# Patient Record
Sex: MALE | Race: WHITE | NOT HISPANIC OR LATINO | URBAN - METROPOLITAN AREA
[De-identification: names, ages, dates, MRNs, and addresses within clinical notes are randomized per-mention and may not be internally consistent; named-entity substitution may affect disease eponyms.]

---

## 2017-03-07 ENCOUNTER — INPATIENT (INPATIENT)
Facility: HOSPITAL | Age: 72
LOS: 1 days | Discharge: HOME | DRG: 603 | End: 2017-03-09
Attending: HOSPITALIST | Admitting: INTERNAL MEDICINE
Payer: MEDICARE

## 2017-03-07 VITALS
OXYGEN SATURATION: 98 % | HEART RATE: 88 BPM | RESPIRATION RATE: 18 BRPM | DIASTOLIC BLOOD PRESSURE: 77 MMHG | SYSTOLIC BLOOD PRESSURE: 131 MMHG | TEMPERATURE: 98 F

## 2017-03-07 DIAGNOSIS — F17.200 NICOTINE DEPENDENCE, UNSPECIFIED, UNCOMPLICATED: ICD-10-CM

## 2017-03-07 DIAGNOSIS — C18.9 MALIGNANT NEOPLASM OF COLON, UNSPECIFIED: ICD-10-CM

## 2017-03-07 DIAGNOSIS — Z98.89 OTHER SPECIFIED POSTPROCEDURAL STATES: Chronic | ICD-10-CM

## 2017-03-07 DIAGNOSIS — H44.002 UNSPECIFIED PURULENT ENDOPHTHALMITIS, LEFT EYE: ICD-10-CM

## 2017-03-07 DIAGNOSIS — B19.20 UNSPECIFIED VIRAL HEPATITIS C WITHOUT HEPATIC COMA: ICD-10-CM

## 2017-03-07 LAB
ALBUMIN SERPL ELPH-MCNC: 4 G/DL — SIGNIFICANT CHANGE UP (ref 3.3–5)
ALP SERPL-CCNC: 59 U/L — SIGNIFICANT CHANGE UP (ref 40–120)
ALT FLD-CCNC: 19 U/L RC — SIGNIFICANT CHANGE UP (ref 10–45)
ANION GAP SERPL CALC-SCNC: 14 MMOL/L — SIGNIFICANT CHANGE UP (ref 5–17)
AST SERPL-CCNC: 19 U/L — SIGNIFICANT CHANGE UP (ref 10–40)
BASOPHILS # BLD AUTO: 0 K/UL — SIGNIFICANT CHANGE UP (ref 0–0.2)
BASOPHILS NFR BLD AUTO: 0.5 % — SIGNIFICANT CHANGE UP (ref 0–2)
BILIRUB SERPL-MCNC: 0.4 MG/DL — SIGNIFICANT CHANGE UP (ref 0.2–1.2)
BUN SERPL-MCNC: 19 MG/DL — SIGNIFICANT CHANGE UP (ref 7–23)
CALCIUM SERPL-MCNC: 9 MG/DL — SIGNIFICANT CHANGE UP (ref 8.4–10.5)
CHLORIDE SERPL-SCNC: 107 MMOL/L — SIGNIFICANT CHANGE UP (ref 96–108)
CO2 SERPL-SCNC: 23 MMOL/L — SIGNIFICANT CHANGE UP (ref 22–31)
CREAT SERPL-MCNC: 0.88 MG/DL — SIGNIFICANT CHANGE UP (ref 0.5–1.3)
EOSINOPHIL # BLD AUTO: 0.2 K/UL — SIGNIFICANT CHANGE UP (ref 0–0.5)
EOSINOPHIL NFR BLD AUTO: 2.6 % — SIGNIFICANT CHANGE UP (ref 0–6)
GLUCOSE SERPL-MCNC: 96 MG/DL — SIGNIFICANT CHANGE UP (ref 70–99)
HCT VFR BLD CALC: 43.9 % — SIGNIFICANT CHANGE UP (ref 39–50)
HGB BLD-MCNC: 15.6 G/DL — SIGNIFICANT CHANGE UP (ref 13–17)
LYMPHOCYTES # BLD AUTO: 2.4 K/UL — SIGNIFICANT CHANGE UP (ref 1–3.3)
LYMPHOCYTES # BLD AUTO: 29.2 % — SIGNIFICANT CHANGE UP (ref 13–44)
MCHC RBC-ENTMCNC: 35.3 PG — HIGH (ref 27–34)
MCHC RBC-ENTMCNC: 35.5 GM/DL — SIGNIFICANT CHANGE UP (ref 32–36)
MCV RBC AUTO: 99.3 FL — SIGNIFICANT CHANGE UP (ref 80–100)
MONOCYTES # BLD AUTO: 0.6 K/UL — SIGNIFICANT CHANGE UP (ref 0–0.9)
MONOCYTES NFR BLD AUTO: 7.9 % — SIGNIFICANT CHANGE UP (ref 2–14)
NEUTROPHILS # BLD AUTO: 4.9 K/UL — SIGNIFICANT CHANGE UP (ref 1.8–7.4)
NEUTROPHILS NFR BLD AUTO: 59.8 % — SIGNIFICANT CHANGE UP (ref 43–77)
PLATELET # BLD AUTO: 173 K/UL — SIGNIFICANT CHANGE UP (ref 150–400)
POTASSIUM SERPL-MCNC: 4 MMOL/L — SIGNIFICANT CHANGE UP (ref 3.5–5.3)
POTASSIUM SERPL-SCNC: 4 MMOL/L — SIGNIFICANT CHANGE UP (ref 3.5–5.3)
PROT SERPL-MCNC: 6.8 G/DL — SIGNIFICANT CHANGE UP (ref 6–8.3)
RBC # BLD: 4.42 M/UL — SIGNIFICANT CHANGE UP (ref 4.2–5.8)
RBC # FLD: 11.8 % — SIGNIFICANT CHANGE UP (ref 10.3–14.5)
SODIUM SERPL-SCNC: 144 MMOL/L — SIGNIFICANT CHANGE UP (ref 135–145)
WBC # BLD: 8.2 K/UL — SIGNIFICANT CHANGE UP (ref 3.8–10.5)
WBC # FLD AUTO: 8.2 K/UL — SIGNIFICANT CHANGE UP (ref 3.8–10.5)

## 2017-03-07 PROCEDURE — 70487 CT MAXILLOFACIAL W/DYE: CPT | Mod: 26

## 2017-03-07 PROCEDURE — 99285 EMERGENCY DEPT VISIT HI MDM: CPT | Mod: 25

## 2017-03-07 PROCEDURE — 99223 1ST HOSP IP/OBS HIGH 75: CPT | Mod: AI

## 2017-03-07 PROCEDURE — 93010 ELECTROCARDIOGRAM REPORT: CPT

## 2017-03-07 RX ORDER — VANCOMYCIN HCL 1 G
1000 VIAL (EA) INTRAVENOUS ONCE
Qty: 0 | Refills: 0 | Status: COMPLETED | OUTPATIENT
Start: 2017-03-07 | End: 2017-03-07

## 2017-03-07 RX ORDER — SODIUM CHLORIDE 9 MG/ML
1000 INJECTION INTRAMUSCULAR; INTRAVENOUS; SUBCUTANEOUS ONCE
Qty: 0 | Refills: 0 | Status: COMPLETED | OUTPATIENT
Start: 2017-03-07 | End: 2017-03-07

## 2017-03-07 RX ADMIN — Medication 250 MILLIGRAM(S): at 22:00

## 2017-03-07 RX ADMIN — SODIUM CHLORIDE 500 MILLILITER(S): 9 INJECTION INTRAMUSCULAR; INTRAVENOUS; SUBCUTANEOUS at 21:59

## 2017-03-07 NOTE — ED PROVIDER NOTE - CONSTITUTIONAL, MLM
normal... Well appearing, well nourished, awake, alert, oriented to person, place, time/situation and in no apparent distress. Afebrile with normal vitals.

## 2017-03-07 NOTE — ED PROVIDER NOTE - OBJECTIVE STATEMENT
71 year old male patient complaining of pus coming out of left eye. Patient had left eye enucleation in middle of January. Subsequently infected and placed on oral clindamycin. However, symptoms have worsened. Seen by primary ophthalmologist Dr. Lee who sent him here for IV antibiotics. Patient reports no headache. No fever/chills or nausea/vomiting. Quality of pus is yellow and foul smelling. Severity is 7/10. No modifiers.

## 2017-03-07 NOTE — ED PROVIDER NOTE - NS ED MD SCRIBE ATTENDING SCRIBE SECTIONS
VITAL SIGNS( Pullset)/PHYSICAL EXAM/HISTORY OF PRESENT ILLNESS/PAST MEDICAL/SURGICAL/SOCIAL HISTORY/DISPOSITION/HIV/REVIEW OF SYSTEMS

## 2017-03-07 NOTE — ED ADULT NURSE NOTE - PMH
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

## 2017-03-07 NOTE — H&P ADULT. - HISTORY OF PRESENT ILLNESS
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  Pt states had surgery 1/11 with hip flap for closure.  States shortly after surgery + infection at hip site requiring abx.  Then noticed infection of eye as well.  Pt has been wearing eye prosthesis.  Pt has been on PO clinidamycin for 3 weeks without improvement to eye though states hip infection has resolved.  Saw optho today and was told to come to ED for IV Abx.    In /77, hr 88, temp 36.5, sat 98, rr 18  Given vancomycin 1G IV x 1, NS 1L x 1.  Seen by ophtho.

## 2017-03-07 NOTE — H&P ADULT. - PMH
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

## 2017-03-07 NOTE — H&P ADULT. - PROBLEM SELECTOR PLAN 1
ophtho input appreciated  cont iv vancomycin for now  topical bacitracin bid  no prosthesis but eye covering in place ophtho input appreciated  cont iv vancomycin for now  topical bacitracin bid  no prosthesis but eye covering in place  f/u official CT result ? need for debridement  encourage ambulation, pt highly functional, for DVT prophylaxis

## 2017-03-07 NOTE — ED ADULT NURSE NOTE - OBJECTIVE STATEMENT
pt with hx of prosthetic eye since birth. has had multiple surgeries to revise eye and socket. had surgery last week and sent home on clindamycin, and eye drops/ointment. seen at md today and sent for potential infection to left eye/socket. no drainage, redness around left eye and mild edema noted. pt denies fevers, no resp distress, skin warm, dry.

## 2017-03-07 NOTE — ED PROVIDER NOTE - DETAILS:
MD Cope:  The scribe's documentation has been prepared under my direction and personally reviewed by me in its entirety. I confirm that the note above accurately reflects all work, treatment, procedures, and medical decision making performed by me.  MD Cope:  see the MDM & progress notes for my I&P.

## 2017-03-07 NOTE — ED PROVIDER NOTE - EYES, MLM
Left eye prosthesis bulging out. Some purulent drainage from the orbit. No significant tenderness to palpation.

## 2017-03-07 NOTE — H&P ADULT. - FAMILY HISTORY
Father  Still living? Unknown  Family history of Alzheimer's disease, Age at diagnosis: Age Unknown  Family history of early CAD, Age at diagnosis: Age Unknown

## 2017-03-08 PROCEDURE — 99232 SBSQ HOSP IP/OBS MODERATE 35: CPT

## 2017-03-08 PROCEDURE — 99222 1ST HOSP IP/OBS MODERATE 55: CPT

## 2017-03-08 RX ORDER — BACITRACIN 500 [USP'U]/G
1 OINTMENT OPHTHALMIC
Qty: 0 | Refills: 0 | Status: DISCONTINUED | OUTPATIENT
Start: 2017-03-07 | End: 2017-03-09

## 2017-03-08 RX ORDER — MEROPENEM 1 G/30ML
1000 INJECTION INTRAVENOUS EVERY 12 HOURS
Qty: 0 | Refills: 0 | Status: DISCONTINUED | OUTPATIENT
Start: 2017-03-08 | End: 2017-03-09

## 2017-03-08 RX ORDER — VANCOMYCIN HCL 1 G
1000 VIAL (EA) INTRAVENOUS EVERY 12 HOURS
Qty: 0 | Refills: 0 | Status: DISCONTINUED | OUTPATIENT
Start: 2017-03-07 | End: 2017-03-09

## 2017-03-08 RX ADMIN — BACITRACIN 1 APPLICATION(S): 500 OINTMENT OPHTHALMIC at 04:30

## 2017-03-08 RX ADMIN — Medication 250 MILLIGRAM(S): at 10:30

## 2017-03-08 RX ADMIN — MEROPENEM 200 MILLIGRAM(S): 1 INJECTION INTRAVENOUS at 12:59

## 2017-03-09 ENCOUNTER — TRANSCRIPTION ENCOUNTER (OUTPATIENT)
Age: 72
End: 2017-03-09

## 2017-03-09 VITALS
OXYGEN SATURATION: 95 % | TEMPERATURE: 98 F | SYSTOLIC BLOOD PRESSURE: 129 MMHG | DIASTOLIC BLOOD PRESSURE: 83 MMHG | RESPIRATION RATE: 18 BRPM | HEART RATE: 68 BPM

## 2017-03-09 LAB
ANION GAP SERPL CALC-SCNC: 12 MMOL/L — SIGNIFICANT CHANGE UP (ref 5–17)
BUN SERPL-MCNC: 15 MG/DL — SIGNIFICANT CHANGE UP (ref 7–23)
CALCIUM SERPL-MCNC: 8.3 MG/DL — LOW (ref 8.4–10.5)
CHLORIDE SERPL-SCNC: 107 MMOL/L — SIGNIFICANT CHANGE UP (ref 96–108)
CO2 SERPL-SCNC: 21 MMOL/L — LOW (ref 22–31)
CREAT SERPL-MCNC: 0.86 MG/DL — SIGNIFICANT CHANGE UP (ref 0.5–1.3)
GLUCOSE SERPL-MCNC: 98 MG/DL — SIGNIFICANT CHANGE UP (ref 70–99)
HCT VFR BLD CALC: 39.6 % — SIGNIFICANT CHANGE UP (ref 39–50)
HGB BLD-MCNC: 13.6 G/DL — SIGNIFICANT CHANGE UP (ref 13–17)
MCHC RBC-ENTMCNC: 33.8 PG — SIGNIFICANT CHANGE UP (ref 27–34)
MCHC RBC-ENTMCNC: 34.3 GM/DL — SIGNIFICANT CHANGE UP (ref 32–36)
MCV RBC AUTO: 98.5 FL — SIGNIFICANT CHANGE UP (ref 80–100)
PLATELET # BLD AUTO: 151 K/UL — SIGNIFICANT CHANGE UP (ref 150–400)
POTASSIUM SERPL-MCNC: 4.1 MMOL/L — SIGNIFICANT CHANGE UP (ref 3.5–5.3)
POTASSIUM SERPL-SCNC: 4.1 MMOL/L — SIGNIFICANT CHANGE UP (ref 3.5–5.3)
RBC # BLD: 4.02 M/UL — LOW (ref 4.2–5.8)
RBC # FLD: 12.6 % — SIGNIFICANT CHANGE UP (ref 10.3–14.5)
SODIUM SERPL-SCNC: 140 MMOL/L — SIGNIFICANT CHANGE UP (ref 135–145)
WBC # BLD: 6.31 K/UL — SIGNIFICANT CHANGE UP (ref 3.8–10.5)
WBC # FLD AUTO: 6.31 K/UL — SIGNIFICANT CHANGE UP (ref 3.8–10.5)

## 2017-03-09 PROCEDURE — 99285 EMERGENCY DEPT VISIT HI MDM: CPT | Mod: 25

## 2017-03-09 PROCEDURE — 87040 BLOOD CULTURE FOR BACTERIA: CPT

## 2017-03-09 PROCEDURE — 87070 CULTURE OTHR SPECIMN AEROBIC: CPT

## 2017-03-09 PROCEDURE — 99222 1ST HOSP IP/OBS MODERATE 55: CPT

## 2017-03-09 PROCEDURE — 80053 COMPREHEN METABOLIC PANEL: CPT

## 2017-03-09 PROCEDURE — 96374 THER/PROPH/DIAG INJ IV PUSH: CPT | Mod: XU

## 2017-03-09 PROCEDURE — 80048 BASIC METABOLIC PNL TOTAL CA: CPT

## 2017-03-09 PROCEDURE — 99239 HOSP IP/OBS DSCHRG MGMT >30: CPT

## 2017-03-09 PROCEDURE — 99232 SBSQ HOSP IP/OBS MODERATE 35: CPT

## 2017-03-09 PROCEDURE — 93005 ELECTROCARDIOGRAM TRACING: CPT

## 2017-03-09 PROCEDURE — 70487 CT MAXILLOFACIAL W/DYE: CPT

## 2017-03-09 PROCEDURE — 85027 COMPLETE CBC AUTOMATED: CPT

## 2017-03-09 RX ORDER — BACITRACIN 500 [USP'U]/G
1 OINTMENT OPHTHALMIC THREE TIMES A DAY
Qty: 0 | Refills: 0 | Status: DISCONTINUED | OUTPATIENT
Start: 2017-03-09 | End: 2017-03-09

## 2017-03-09 RX ORDER — BACITRACIN 500 [USP'U]/G
1 OINTMENT OPHTHALMIC
Qty: 1 | Refills: 0
Start: 2017-03-09 | End: 2017-03-19

## 2017-03-09 RX ADMIN — MEROPENEM 200 MILLIGRAM(S): 1 INJECTION INTRAVENOUS at 03:39

## 2017-03-09 RX ADMIN — Medication 250 MILLIGRAM(S): at 01:00

## 2017-03-09 RX ADMIN — Medication 250 MILLIGRAM(S): at 12:48

## 2017-03-09 RX ADMIN — MEROPENEM 200 MILLIGRAM(S): 1 INJECTION INTRAVENOUS at 12:04

## 2017-03-09 NOTE — DISCHARGE NOTE ADULT - MEDICATION SUMMARY - MEDICATIONS TO TAKE
I will START or STAY ON the medications listed below when I get home from the hospital:    Percocet 5/325 oral tablet  -- 1 tab(s) by mouth every 6 hours, As Needed  -- Indication: For Eye pain    bacitracin ophthalmic 500 units/g ophthalmic ointment  -- 1 application in the left eye 3 times a day  -- For the eye.    -- Indication: For Preseptal cellulitis of left eye    Augmentin 875 mg-125 mg oral tablet  -- 1 tab(s) by mouth every 12 hours  -- Finish all this medication unless otherwise directed by prescriber.  Take with food or milk.    -- Indication: For Preseptal cellulitis of left eye

## 2017-03-09 NOTE — DISCHARGE NOTE ADULT - CARE PLAN
Principal Discharge DX:	Eye infection, left Principal Discharge DX:	Preseptal cellulitis of left eye  Goal:	Resolution of infection  Instructions for follow-up, activity and diet:	Please apply the topical bacitracin and take the Augmentin as prescribed.  Please follow-up with Dr. Lee tomorrow.  Dr. Lee will determine if your course of antibiotics needs to be lengthened or shortened.

## 2017-03-09 NOTE — DISCHARGE NOTE ADULT - PLAN OF CARE
Resolution of infection Please apply the topical bacitracin and take the Augmentin as prescribed.  Please follow-up with Dr. Lee tomorrow.  Dr. Lee will determine if your course of antibiotics needs to be lengthened or shortened.

## 2017-03-09 NOTE — DISCHARGE NOTE ADULT - CARE PROVIDER_API CALL
Olivier Lee (MD), Ophthalmology  [address] [place] [id number]  Phone: [phone]  Fax: [phone] Luis F Lee), Ophthalmology  [address] [place] [address]  Phone: [phone]  Fax: [phone]

## 2017-03-09 NOTE — DISCHARGE NOTE ADULT - PATIENT PORTAL LINK FT
“You can access the Follow[facility] Patient Portal, offered by [facility], by registering with the following website: [email or web address]”

## 2017-03-09 NOTE — DISCHARGE NOTE ADULT - CONDITIONS AT DISCHARGE
pt discharge home today, patient agreable to plan, discharge instructions given to patient - understanding verbalized, IVL removed - site remained asymptomatic, all belongings accounted for and with patient

## 2017-03-09 NOTE — DISCHARGE NOTE ADULT - HOSPITAL COURSE
to be completed by attending The patient is a 71-year-old man with congenital blindness of the left eye who underwent a left eye enucleation on 1/11/17 with a dermal/fat graft from the hip.  The patient reported that shortly after surgery he noticed drainage from the eye.  Prior to admission, he had been on PO Clindamycin for 3 weeks without improvement.  He then saw an ophthalmologist on the day of admission and was referred to the ED.    On arrival, the patient was afebrile with no leukocytosis.  The patient was admitted to the medicine service and seen by ophthalmology and ID on consult.  The patient was felt to have preseptal cellulitis secondary to infection of the graft.  He was treated with IV Vancomycin, IV Meropenem, and topical bacitracin.  The patient clinically improved and the plan is for discharge on oral Augmentin and follow-up with Dr. Luis F Lee, outpatient ophthalmologist, the day after discharge.    This plan was discussed with the Dr. Zavala (ophthalmology), Dr. Bowman (ID), and Dr. Lee prior to discharge.

## 2017-03-09 NOTE — DISCHARGE NOTE ADULT - CARE PROVIDERS DIRECT ADDRESSES
,DirectAddress_Unknown,DirectAddress_Unknown ,[email or web address],DirectAddress_Unknown

## 2017-03-10 LAB
CULTURE RESULTS: SIGNIFICANT CHANGE UP
CULTURE RESULTS: SIGNIFICANT CHANGE UP
SPECIMEN SOURCE: SIGNIFICANT CHANGE UP
SPECIMEN SOURCE: SIGNIFICANT CHANGE UP

## 2017-04-14 ENCOUNTER — OUTPATIENT (OUTPATIENT)
Dept: OUTPATIENT SERVICES | Facility: HOSPITAL | Age: 72
LOS: 1 days | End: 2017-04-14
Payer: MEDICARE

## 2017-04-14 VITALS
HEART RATE: 66 BPM | TEMPERATURE: 98 F | DIASTOLIC BLOOD PRESSURE: 82 MMHG | OXYGEN SATURATION: 98 % | RESPIRATION RATE: 16 BRPM | HEIGHT: 67.75 IN | WEIGHT: 188.94 LBS | SYSTOLIC BLOOD PRESSURE: 132 MMHG

## 2017-04-14 DIAGNOSIS — Z98.89 OTHER SPECIFIED POSTPROCEDURAL STATES: Chronic | ICD-10-CM

## 2017-04-14 DIAGNOSIS — C67.9 MALIGNANT NEOPLASM OF BLADDER, UNSPECIFIED: ICD-10-CM

## 2017-04-14 DIAGNOSIS — C18.9 MALIGNANT NEOPLASM OF COLON, UNSPECIFIED: ICD-10-CM

## 2017-04-14 DIAGNOSIS — Z01.818 ENCOUNTER FOR OTHER PREPROCEDURAL EXAMINATION: ICD-10-CM

## 2017-04-14 DIAGNOSIS — Z98.890 OTHER SPECIFIED POSTPROCEDURAL STATES: Chronic | ICD-10-CM

## 2017-04-14 DIAGNOSIS — M54.30 SCIATICA, UNSPECIFIED SIDE: ICD-10-CM

## 2017-04-14 DIAGNOSIS — H54.42 BLINDNESS, LEFT EYE, NORMAL VISION RIGHT EYE: ICD-10-CM

## 2017-04-14 DIAGNOSIS — B19.20 UNSPECIFIED VIRAL HEPATITIS C WITHOUT HEPATIC COMA: ICD-10-CM

## 2017-04-14 LAB
ALBUMIN SERPL ELPH-MCNC: 3.9 G/DL — SIGNIFICANT CHANGE UP (ref 3.3–5)
ALP SERPL-CCNC: 54 U/L — SIGNIFICANT CHANGE UP (ref 40–120)
ALT FLD-CCNC: 18 U/L — SIGNIFICANT CHANGE UP (ref 4–41)
APPEARANCE UR: CLEAR — SIGNIFICANT CHANGE UP
AST SERPL-CCNC: 29 U/L — SIGNIFICANT CHANGE UP (ref 4–40)
BILIRUB SERPL-MCNC: 0.8 MG/DL — SIGNIFICANT CHANGE UP (ref 0.2–1.2)
BILIRUB UR-MCNC: NEGATIVE — SIGNIFICANT CHANGE UP
BLOOD UR QL VISUAL: NEGATIVE — SIGNIFICANT CHANGE UP
BUN SERPL-MCNC: 18 MG/DL — SIGNIFICANT CHANGE UP (ref 7–23)
CALCIUM SERPL-MCNC: 8.8 MG/DL — SIGNIFICANT CHANGE UP (ref 8.4–10.5)
CHLORIDE SERPL-SCNC: 101 MMOL/L — SIGNIFICANT CHANGE UP (ref 98–107)
CO2 SERPL-SCNC: 21 MMOL/L — LOW (ref 22–31)
COLOR SPEC: YELLOW — SIGNIFICANT CHANGE UP
CREAT SERPL-MCNC: 0.9 MG/DL — SIGNIFICANT CHANGE UP (ref 0.5–1.3)
GLUCOSE SERPL-MCNC: 58 MG/DL — LOW (ref 70–99)
GLUCOSE UR-MCNC: NEGATIVE — SIGNIFICANT CHANGE UP
HCT VFR BLD CALC: 48.5 % — SIGNIFICANT CHANGE UP (ref 39–50)
HGB BLD-MCNC: 16.3 G/DL — SIGNIFICANT CHANGE UP (ref 13–17)
KETONES UR-MCNC: NEGATIVE — SIGNIFICANT CHANGE UP
LEUKOCYTE ESTERASE UR-ACNC: NEGATIVE — SIGNIFICANT CHANGE UP
MCHC RBC-ENTMCNC: 33.6 % — SIGNIFICANT CHANGE UP (ref 32–36)
MCHC RBC-ENTMCNC: 33.8 PG — SIGNIFICANT CHANGE UP (ref 27–34)
MCV RBC AUTO: 100.6 FL — HIGH (ref 80–100)
MUCOUS THREADS # UR AUTO: SIGNIFICANT CHANGE UP
NITRITE UR-MCNC: NEGATIVE — SIGNIFICANT CHANGE UP
PH UR: 6 — SIGNIFICANT CHANGE UP (ref 4.6–8)
PLATELET # BLD AUTO: 188 K/UL — SIGNIFICANT CHANGE UP (ref 150–400)
PMV BLD: 12.1 FL — SIGNIFICANT CHANGE UP (ref 7–13)
POTASSIUM SERPL-MCNC: 4.8 MMOL/L — SIGNIFICANT CHANGE UP (ref 3.5–5.3)
POTASSIUM SERPL-SCNC: 4.8 MMOL/L — SIGNIFICANT CHANGE UP (ref 3.5–5.3)
PROT SERPL-MCNC: 7 G/DL — SIGNIFICANT CHANGE UP (ref 6–8.3)
PROT UR-MCNC: NEGATIVE — SIGNIFICANT CHANGE UP
RBC # BLD: 4.82 M/UL — SIGNIFICANT CHANGE UP (ref 4.2–5.8)
RBC # FLD: 12.7 % — SIGNIFICANT CHANGE UP (ref 10.3–14.5)
RBC CASTS # UR COMP ASSIST: SIGNIFICANT CHANGE UP (ref 0–?)
SODIUM SERPL-SCNC: 140 MMOL/L — SIGNIFICANT CHANGE UP (ref 135–145)
SP GR SPEC: 1.02 — SIGNIFICANT CHANGE UP (ref 1–1.03)
UROBILINOGEN FLD QL: NORMAL E.U. — SIGNIFICANT CHANGE UP (ref 0.1–0.2)
WBC # BLD: 8.37 K/UL — SIGNIFICANT CHANGE UP (ref 3.8–10.5)
WBC # FLD AUTO: 8.37 K/UL — SIGNIFICANT CHANGE UP (ref 3.8–10.5)
WBC UR QL: SIGNIFICANT CHANGE UP (ref 0–?)

## 2017-04-14 PROCEDURE — 93010 ELECTROCARDIOGRAM REPORT: CPT

## 2017-04-14 NOTE — H&P PST ADULT - NEGATIVE ALLERGY TYPES
no reactions to medicines/no reactions to animals/no reactions to food/no reactions to insect bites/no indoor environmental allergies/no outdoor environmental allergies

## 2017-04-14 NOTE — H&P PST ADULT - PSH
H/O eye surgery  1/2017  History of cystoscopy  TURBT - multiple procedures ( last procedure 2/2016)

## 2017-04-14 NOTE — H&P PST ADULT - NEGATIVE PSYCHIATRIC SYMPTOMS
no depression/no anxiety/no agitation/no visual hallucinations/no hyperactivity/no paranoia/no insomnia/no memory loss/no mood swings/no auditory hallucinations/no suicidal ideation

## 2017-04-14 NOTE — H&P PST ADULT - RS GEN PE MLT RESP DETAILS PC
breath sounds equal/no rhonchi/no rales/no intercostal retractions/no wheezes/no chest wall tenderness/clear to auscultation bilaterally/no subcutaneous emphysema/respirations non-labored/good air movement/airway patent

## 2017-04-14 NOTE — H&P PST ADULT - VISION (WITH CORRECTIVE LENSES IF THE PATIENT USUALLY WEARS THEM):
reading glasses for right eye/Partially impaired: cannot see medication labels or newsprint, but can see obstacles in path, and the surrounding layout; can count fingers at arm's length

## 2017-04-14 NOTE — H&P PST ADULT - NEGATIVE GENERAL GENITOURINARY SYMPTOMS
no nocturia/no hematuria/no dysuria/no incontinence/normal urinary frequency/no flank pain R/no bladder infections/no renal colic/no flank pain L

## 2017-04-14 NOTE — H&P PST ADULT - PROBLEM SELECTOR PLAN 1
yes
Pt is scheduled for a cystoscopy on 4/18/17. Preop instructions provided including NPO status, Pepcid, no smoking 24 hr prior to sx.

## 2017-04-14 NOTE — H&P PST ADULT - NEGATIVE GENERAL SYMPTOMS
no anorexia/no weight loss/no fatigue/no chills/no malaise/no fever/no sweating/no polyphagia/no polyuria/no polydipsia/no weight gain

## 2017-04-14 NOTE — H&P PST ADULT - ABILITY TO HEAR (WITH HEARING AID OR HEARING APPLIANCE IF NORMALLY USED):
Mildly to Moderately Impaired: difficulty hearing in some environments or speaker may need to increase volume or speak distinctly/right ear slight hearing loss

## 2017-04-14 NOTE — H&P PST ADULT - FAMILY HISTORY
Father  Still living? No  Family history of Alzheimer's disease, Age at diagnosis: Age Unknown     Mother  Still living? Yes, Estimated age: Age Unknown  Family history of early CAD, Age at diagnosis: Age Unknown

## 2017-04-14 NOTE — H&P PST ADULT - NSANTHOSAYNRD_GEN_A_CORE
No. TONY screening performed.  STOP BANG Legend: 0-2 = LOW Risk; 3-4 = INTERMEDIATE Risk; 5-8 = HIGH Risk/never tested

## 2017-04-14 NOTE — H&P PST ADULT - HISTORY OF PRESENT ILLNESS
69 y/o male with hx hepatitis C,  lef eye blindness, bladder tumors with hematuria, s/p multiple  cystoscopies, and a TURBT. Pt now scheduled for follow up cystoscopy on 4/18/17. Denies any complaints at this time.

## 2017-04-14 NOTE — H&P PST ADULT - NEGATIVE SKIN SYMPTOMS
no pitted nails/no change in size/color of mole/no dryness/no rash/no tumor/no hair loss/no itching/no brittle nails

## 2017-04-14 NOTE — H&P PST ADULT - NEGATIVE NEUROLOGICAL SYMPTOMS
no hemiparesis/no syncope/no loss of sensation/no focal seizures/no weakness/no headache/no tremors/no transient paralysis/no difficulty walking/no loss of consciousness/no confusion/no generalized seizures/no vertigo/no facial palsy

## 2017-04-14 NOTE — H&P PST ADULT - NEGATIVE MUSCULOSKELETAL SYMPTOMS
no stiffness/no joint swelling/no arm pain L/no arm pain R/no muscle weakness/no arthralgia/no arthritis/no myalgia/no muscle cramps/no neck pain

## 2017-04-14 NOTE — H&P PST ADULT - NEGATIVE ENMT SYMPTOMS
no sinus symptoms/no nasal obstruction/no tinnitus/no throat pain/no abnormal taste sensation/no nose bleeds/no dysphagia/no recurrent cold sores/no nasal discharge/no gum bleeding/no nasal congestion/no post-nasal discharge/no dry mouth/no vertigo

## 2017-04-14 NOTE — H&P PST ADULT - NEGATIVE GASTROINTESTINAL SYMPTOMS
no nausea/no melena/no change in bowel habits/no constipation/no vomiting/no diarrhea/no abdominal pain/no flatulence/no hematochezia

## 2017-04-14 NOTE — H&P PST ADULT - NEGATIVE CARDIOVASCULAR SYMPTOMS
no claudication/no palpitations/no chest pain/no peripheral edema/no dyspnea on exertion/no orthopnea/no paroxysmal nocturnal dyspnea

## 2017-04-14 NOTE — H&P PST ADULT - NEGATIVE BREAST SYMPTOMS
no breast lump R/no breast tenderness L/no nipple discharge R/no nipple discharge L/no breast tenderness R/no breast lump L

## 2017-04-14 NOTE — H&P PST ADULT - GASTROINTESTINAL DETAILS
no bruit/nontender/no organomegaly/no rebound tenderness/no distention/bowel sounds normal/soft/no masses palpable/no guarding/no rigidity

## 2017-04-16 LAB
BACTERIA UR CULT: SIGNIFICANT CHANGE UP
SPECIMEN SOURCE: SIGNIFICANT CHANGE UP

## 2017-04-18 ENCOUNTER — APPOINTMENT (OUTPATIENT)
Dept: UROLOGY | Facility: AMBULATORY SURGERY CENTER | Age: 72
End: 2017-04-18

## 2017-04-18 ENCOUNTER — OUTPATIENT (OUTPATIENT)
Dept: OUTPATIENT SERVICES | Facility: HOSPITAL | Age: 72
LOS: 1 days | Discharge: HOME | End: 2017-04-18
Payer: MEDICARE

## 2017-04-18 VITALS — TEMPERATURE: 98 F | RESPIRATION RATE: 17 BRPM | OXYGEN SATURATION: 100 % | HEART RATE: 79 BPM

## 2017-04-18 VITALS
SYSTOLIC BLOOD PRESSURE: 126 MMHG | HEART RATE: 62 BPM | WEIGHT: 188.94 LBS | RESPIRATION RATE: 16 BRPM | OXYGEN SATURATION: 97 % | DIASTOLIC BLOOD PRESSURE: 72 MMHG | HEIGHT: 67.75 IN | TEMPERATURE: 98 F

## 2017-04-18 DIAGNOSIS — Z98.89 OTHER SPECIFIED POSTPROCEDURAL STATES: Chronic | ICD-10-CM

## 2017-04-18 DIAGNOSIS — Z98.890 OTHER SPECIFIED POSTPROCEDURAL STATES: Chronic | ICD-10-CM

## 2017-04-18 DIAGNOSIS — C67.9 MALIGNANT NEOPLASM OF BLADDER, UNSPECIFIED: ICD-10-CM

## 2017-04-18 PROCEDURE — 52000 CYSTOURETHROSCOPY: CPT

## 2017-04-19 ENCOUNTER — TRANSCRIPTION ENCOUNTER (OUTPATIENT)
Age: 72
End: 2017-04-19

## 2017-06-08 ENCOUNTER — APPOINTMENT (OUTPATIENT)
Dept: GASTROENTEROLOGY | Facility: CLINIC | Age: 72
End: 2017-06-08

## 2017-06-12 ENCOUNTER — APPOINTMENT (OUTPATIENT)
Dept: GASTROENTEROLOGY | Facility: CLINIC | Age: 72
End: 2017-06-12

## 2017-08-15 ENCOUNTER — APPOINTMENT (OUTPATIENT)
Dept: GASTROENTEROLOGY | Facility: CLINIC | Age: 72
End: 2017-08-15
Payer: MEDICARE

## 2017-08-15 VITALS
DIASTOLIC BLOOD PRESSURE: 82 MMHG | TEMPERATURE: 98 F | HEIGHT: 68 IN | SYSTOLIC BLOOD PRESSURE: 136 MMHG | WEIGHT: 189 LBS | BODY MASS INDEX: 28.64 KG/M2

## 2017-08-15 PROCEDURE — 36415 COLL VENOUS BLD VENIPUNCTURE: CPT

## 2017-08-15 PROCEDURE — 99213 OFFICE O/P EST LOW 20 MIN: CPT | Mod: 25

## 2017-08-28 LAB
AFP-TM SERPL-MCNC: 1.9 NG/ML
ALBUMIN SERPL ELPH-MCNC: 4 G/DL
ALP BLD-CCNC: 66 U/L
ALT SERPL-CCNC: 17 U/L
AST SERPL-CCNC: 13 U/L
BILIRUB DIRECT SERPL-MCNC: 0.2 MG/DL
BILIRUB INDIRECT SERPL-MCNC: 0.3 MG/DL
BILIRUB SERPL-MCNC: 0.5 MG/DL
GGT SERPL-CCNC: 40 U/L
HCV RNA SERPL NAA DL=5-ACNC: NOT DETECTED IU/ML
HCV RNA SERPL NAA+PROBE-LOG IU: NOT DETECTED LOGIU/ML
PROT SERPL-MCNC: 7.1 G/DL

## 2017-10-16 ENCOUNTER — APPOINTMENT (OUTPATIENT)
Dept: NEUROLOGY | Facility: CLINIC | Age: 72
End: 2017-10-16

## 2018-07-20 PROBLEM — C18.9 MALIGNANT NEOPLASM OF COLON, UNSPECIFIED: Chronic | Status: ACTIVE | Noted: 2017-03-07

## 2018-07-20 NOTE — ED PROVIDER NOTE - MEDICAL DECISION MAKING DETAILS
Date of injury: 1/15/2018    SUBJECTIVE:    Patient continues to experience soreness in his right shoulder and stiffness in his neck. He continues with care.      OBJECTIVE:  Objective data unchanged  Range of motion of the cervical spine is restricted in right rotation and right lateral bending. Capsular swelling is found at C5 on the right with hypertonistic right trapezius muscles. Orthopedic testing reveals capsular swelling on the right hand side of the lower neck from C5 through  T1.    ASSESSMENT:   Cervical strain  Segmental and somatic dysfunction of the cervical region  Segmental and somatic dysfunction of the thoracic region          PLAN:   I adjusted him at C5-C6 and also at the T5 level.   Will see him in a few days.    Care initiated 2/5/2018             Patient with post op infection. Surgery did not take place here. Not known to our ophthalmologists. Failing oral antibiotics. Plan: optho will need to consult on patient. CT maxface to r/o abscess. IV vancomycin. Likely unattached admission.

## 2018-07-24 ENCOUNTER — APPOINTMENT (OUTPATIENT)
Dept: GASTROENTEROLOGY | Facility: CLINIC | Age: 73
End: 2018-07-24

## 2018-08-31 ENCOUNTER — APPOINTMENT (OUTPATIENT)
Dept: GASTROENTEROLOGY | Facility: CLINIC | Age: 73
End: 2018-08-31
Payer: MEDICARE

## 2018-08-31 VITALS
HEART RATE: 64 BPM | BODY MASS INDEX: 27.43 KG/M2 | DIASTOLIC BLOOD PRESSURE: 80 MMHG | TEMPERATURE: 98.1 F | WEIGHT: 181 LBS | SYSTOLIC BLOOD PRESSURE: 128 MMHG | HEIGHT: 68 IN | OXYGEN SATURATION: 98 %

## 2018-08-31 PROCEDURE — 99213 OFFICE O/P EST LOW 20 MIN: CPT | Mod: 25

## 2018-08-31 PROCEDURE — 36415 COLL VENOUS BLD VENIPUNCTURE: CPT

## 2018-09-03 LAB
AFP-TM SERPL-MCNC: 1.3 NG/ML
ALBUMIN SERPL ELPH-MCNC: 3.9 G/DL
ALP BLD-CCNC: 57 U/L
ALT SERPL-CCNC: 18 U/L
AST SERPL-CCNC: 21 U/L
BILIRUB DIRECT SERPL-MCNC: 0.2 MG/DL
BILIRUB INDIRECT SERPL-MCNC: 0.4 MG/DL
BILIRUB SERPL-MCNC: 0.5 MG/DL
GGT SERPL-CCNC: 32 U/L
HCV RNA SERPL NAA DL=5-ACNC: NOT DETECTED IU/ML
HCV RNA SERPL NAA+PROBE-LOG IU: NOT DETECTED LOGIU/ML
PROT SERPL-MCNC: 6.9 G/DL

## 2018-10-05 NOTE — ASU PREOPERATIVE ASSESSMENT, ADULT (IPARK ONLY) - HEIGHT IN CM
Date of service: 



10/05/2018



HISTORY:

 Intubated 



COMPARISON:

10/04/2018 



FINDINGS:



LUNGS:

Resolution of previously seen left upper lobe infiltrate.



PLEURA:

No significant pleural effusion identified, no pneumothorax apparent.



CARDIOVASCULAR:

Moderate cardiomegaly



OSSEOUS STRUCTURES:

No significant abnormalities.



VISUALIZED UPPER ABDOMEN:

Normal.



OTHER FINDINGS:

Endotracheal and nasogastric tube in satisfactory position



IMPRESSION:

Resolution of left upper lobe infiltrate 172.08

## 2018-10-31 ENCOUNTER — APPOINTMENT (OUTPATIENT)
Dept: GASTROENTEROLOGY | Facility: CLINIC | Age: 73
End: 2018-10-31
Payer: MEDICARE

## 2018-10-31 ENCOUNTER — APPOINTMENT (OUTPATIENT)
Dept: GASTROENTEROLOGY | Facility: AMBULATORY MEDICAL SERVICES | Age: 73
End: 2018-10-31

## 2018-10-31 PROCEDURE — 45380 COLONOSCOPY AND BIOPSY: CPT

## 2019-01-07 ENCOUNTER — APPOINTMENT (OUTPATIENT)
Dept: GASTROENTEROLOGY | Facility: CLINIC | Age: 74
End: 2019-01-07
Payer: MEDICARE

## 2019-01-07 VITALS
OXYGEN SATURATION: 97 % | SYSTOLIC BLOOD PRESSURE: 140 MMHG | HEIGHT: 68 IN | DIASTOLIC BLOOD PRESSURE: 80 MMHG | TEMPERATURE: 97.5 F | WEIGHT: 198 LBS | HEART RATE: 65 BPM | BODY MASS INDEX: 30.01 KG/M2

## 2019-01-07 DIAGNOSIS — Z86.19 PERSONAL HISTORY OF OTHER INFECTIOUS AND PARASITIC DISEASES: ICD-10-CM

## 2019-01-07 PROCEDURE — 99213 OFFICE O/P EST LOW 20 MIN: CPT

## 2019-01-07 NOTE — HISTORY OF PRESENT ILLNESS
[FreeTextEntry1] : We reviewed the evaluations done since the patient's last visit on August 31, 2018. Blood work from that day was normal with no evidence of hepatitis C. The patient is status post colonoscopy performed on October 31, 2018. A polyp was removed with negative biopsies. The patient also has diverticulosis and internal hemorrhoids which are asymptomatic. The patient denies abdominal pain. Bowel movements are normal without melena or bright blood per rectum.\par \par \par Note from 8/31/18 - The patient has a history of colonic polyps and a history of hep C with a sustained viral response. His blood work from his last visit in August 2017 revealed an undetectable HCVRNA. The patient has had for eye surgeries in the past 2 months and 8 in the past 2 years but is otherwise well. He denies abdominal pain. He is to solid bowel movements a day without melena or bright red blood per rectum. He denies heartburn or dysphagia. He has lost 8 pounds in the past year. The patient has not been hospitalized in the past year and denies any cardiac issues.\par \par \par Note from 8/15/17 - The patient has a history of colonic polyps, a history of bladder cancer, and a history of hepatitis C which was treated in 2009 with Pegasys and ribavirin. Despite only taking 14 weeks of treatment due to a drug eruption, he is achieved a sustained viral response. The patient feels well denying abdominal pain, diarrhea, constipation, melena, bright blood per rectum, heartburn, or dysphagia. His weight is stable. The patient has not been hospitalized in the past year and denies any cardiac issues. The patient takes Percocet essentially daily for back and leg pain.

## 2019-01-07 NOTE — ASSESSMENT
[FreeTextEntry1] : Patient status post colonoscopy with diverticulosis. He has a history of colonic polyps. He has a history of hepatitis C with negative labs in August.\par \par Information was given to the patient regarding diverticulosis and a high-fiber diet.\par \par Patient will return to see me over the summer at which time we will check labs which should be followed every 6-12 months.\par \par We will repeat a colonoscopy in 3 years that is November 2021 given the patient's history of polyps.\par \par \par Plan from 8/31/18 - Patient with a history of colonic polyps and a history of hepatitis C with sustained response.\par \par A colonoscopy has been scheduled. The risks, benefits, alternatives, and limitations of the procedure, including the possibility of missed lesions, were explained.\par \par Blood work was sent for HCVRNA, LFTs, AFP.\par \par \par Plan from 8/15/17 - Patient with a history of colonic polyps and history of hepatitis C with sustained viral response to treatment.\par \par Blood work was sent for HCV RNA, LFTs, AFP.\par \par Patient is not yet do for his colonoscopy. He will return to see me in one year at which time we will pursue colonoscopy.

## 2019-01-07 NOTE — CONSULT LETTER
[FreeTextEntry1] : Dear Dr. Shana Nix,\par \par I had the pleasure of seeing your patient TAJ HUBBARD in the office today.  My office note is attached.\par \par Thank you very much for allowing me to participate in the care of your patient.\par \par Sincerely,\par \par Yeison Alex M.D., FACG, FACP\par Director, Celiac Program at [facility]\par  of Medicine\par [employer] at [facility] Practice Director,\par [facility] - Gastroenterology/Internal Medicine at [place]\par [address]\[place] [address]\par Tel: [phone]\par Email: [email or web address]

## 2019-01-07 NOTE — REVIEW OF SYSTEMS
[As Noted in HPI] : as noted in HPI [Negative] : Heme/Lymph [FreeTextEntry9] : back, hip, ankle pain

## 2019-03-04 ENCOUNTER — APPOINTMENT (OUTPATIENT)
Dept: UROLOGY | Facility: CLINIC | Age: 74
End: 2019-03-04
Payer: MEDICARE

## 2019-03-04 VITALS
BODY MASS INDEX: 29.25 KG/M2 | SYSTOLIC BLOOD PRESSURE: 122 MMHG | DIASTOLIC BLOOD PRESSURE: 70 MMHG | HEIGHT: 68 IN | TEMPERATURE: 97.7 F | OXYGEN SATURATION: 96 % | RESPIRATION RATE: 14 BRPM | WEIGHT: 193 LBS | HEART RATE: 66 BPM

## 2019-03-04 DIAGNOSIS — Z87.891 PERSONAL HISTORY OF NICOTINE DEPENDENCE: ICD-10-CM

## 2019-03-04 PROCEDURE — 99213 OFFICE O/P EST LOW 20 MIN: CPT

## 2019-03-04 RX ORDER — SODIUM SULFATE, POTASSIUM SULFATE, MAGNESIUM SULFATE 17.5; 3.13; 1.6 G/ML; G/ML; G/ML
17.5-3.13-1.6 SOLUTION, CONCENTRATE ORAL
Qty: 1 | Refills: 0 | Status: DISCONTINUED | COMMUNITY
Start: 2018-08-31 | End: 2019-03-04

## 2019-03-04 NOTE — LETTER BODY
[Dear  ___] : Dear  [unfilled], [Consult Letter:] : I had the pleasure of evaluating your patient, [unfilled]. [Consult Closing:] : Thank you very much for allowing me to participate in the care of this patient.  If you have any questions, please do not hesitate to contact me. [FreeTextEntry1] : Address: [address] [place] [address]\par Tel: [phone]\par

## 2019-03-04 NOTE — HISTORY OF PRESENT ILLNESS
[FreeTextEntry1] : He is a 73 year-old man who is in today in followup for bladder cancer. There is no hematuria or dysuria. Nocturia is 3 times but he drinks water throughout the night and he is not bothered by it. He has cystoscopies under anesthesia. His last cystoscopy was in 2017. PSA level was 0.7 in October 2018.\par \par Previous note: In 2012 he underwent resection of a bladder tumor. Since he is unable to tolerate cystoscopy in the office, he undergoes cystoscopy with anesthesia. Cystoscopy and bladder biopsy in July 2015 were negative. In December 2012, pathology report showed papillary urothelial carcinoma, low-grade and noninvasive.

## 2019-03-04 NOTE — ASSESSMENT
[FreeTextEntry1] : He does not have significant urinary symptoms and nocturia is related to fluid intake. Urine studies will be submitted. He will be scheduled in the near future for cystoscopy and possible bladder biopsy. He should have the procedure done annually.\par \par Todd Caicedo MD, FACS\par [facility]\par  of Urology\par \par [address], [address]\[place] [address]\par \par [address], [address]\[place] [address]\par \par Tel: [phone]\par Fax: [phone]

## 2019-03-04 NOTE — PHYSICAL EXAM
[General Appearance - Well Developed] : well developed [General Appearance - Well Nourished] : well nourished [Normal Appearance] : normal appearance [Well Groomed] : well groomed [General Appearance - In No Acute Distress] : no acute distress [Abdomen Soft] : soft [Abdomen Tenderness] : non-tender [Costovertebral Angle Tenderness] : no ~M costovertebral angle tenderness [Urethral Meatus] : meatus normal [Penis Abnormality] : normal uncircumcised penis [Urinary Bladder Findings] : the bladder was normal on palpation [Scrotum] : the scrotum was normal [Epididymis] : the epididymides were normal [Testes Tenderness] : no tenderness of the testes [Testes Mass (___cm)] : there were no testicular masses [Prostate Enlargement] : the prostate was not enlarged [Prostate Tenderness] : the prostate was not tender [No Prostate Nodules] : no prostate nodules [FreeTextEntry1] : ELMER done previously [] : no respiratory distress [Respiration, Rhythm And Depth] : normal respiratory rhythm and effort [Exaggerated Use Of Accessory Muscles For Inspiration] : no accessory muscle use

## 2019-03-05 LAB
APPEARANCE: CLEAR
BACTERIA UR CULT: NORMAL
BACTERIA: NEGATIVE
BILIRUBIN URINE: NEGATIVE
BLOOD URINE: NEGATIVE
COLOR: YELLOW
GLUCOSE QUALITATIVE U: NEGATIVE
HYALINE CASTS: 0 /LPF
KETONES URINE: NEGATIVE
LEUKOCYTE ESTERASE URINE: NEGATIVE
MICROSCOPIC-UA: NORMAL
NITRITE URINE: NEGATIVE
PH URINE: 6
PROTEIN URINE: NEGATIVE
RED BLOOD CELLS URINE: 1 /HPF
SPECIFIC GRAVITY URINE: 1.01
SQUAMOUS EPITHELIAL CELLS: 1 /HPF
URINE CYTOLOGY: NORMAL
UROBILINOGEN URINE: NORMAL
WHITE BLOOD CELLS URINE: 1 /HPF

## 2019-03-11 ENCOUNTER — OUTPATIENT (OUTPATIENT)
Dept: OUTPATIENT SERVICES | Facility: HOSPITAL | Age: 74
LOS: 1 days | End: 2019-03-11
Payer: MEDICARE

## 2019-03-11 VITALS
SYSTOLIC BLOOD PRESSURE: 150 MMHG | HEIGHT: 66 IN | DIASTOLIC BLOOD PRESSURE: 80 MMHG | TEMPERATURE: 98 F | HEART RATE: 65 BPM | WEIGHT: 190.04 LBS

## 2019-03-11 DIAGNOSIS — D49.4 NEOPLASM OF UNSPECIFIED BEHAVIOR OF BLADDER: ICD-10-CM

## 2019-03-11 DIAGNOSIS — Z98.89 OTHER SPECIFIED POSTPROCEDURAL STATES: Chronic | ICD-10-CM

## 2019-03-11 DIAGNOSIS — C67.9 MALIGNANT NEOPLASM OF BLADDER, UNSPECIFIED: ICD-10-CM

## 2019-03-11 DIAGNOSIS — Z98.890 OTHER SPECIFIED POSTPROCEDURAL STATES: Chronic | ICD-10-CM

## 2019-03-11 LAB
ALBUMIN SERPL ELPH-MCNC: 4 G/DL — SIGNIFICANT CHANGE UP (ref 3.3–5)
ALP SERPL-CCNC: 57 U/L — SIGNIFICANT CHANGE UP (ref 40–120)
ALT FLD-CCNC: 11 U/L — SIGNIFICANT CHANGE UP (ref 4–41)
ANION GAP SERPL CALC-SCNC: 13 MMO/L — SIGNIFICANT CHANGE UP (ref 7–14)
APPEARANCE UR: CLEAR — SIGNIFICANT CHANGE UP
AST SERPL-CCNC: 14 U/L — SIGNIFICANT CHANGE UP (ref 4–40)
BACTERIA # UR AUTO: NEGATIVE — SIGNIFICANT CHANGE UP
BILIRUB SERPL-MCNC: 0.3 MG/DL — SIGNIFICANT CHANGE UP (ref 0.2–1.2)
BILIRUB UR-MCNC: NEGATIVE — SIGNIFICANT CHANGE UP
BLOOD UR QL VISUAL: NEGATIVE — SIGNIFICANT CHANGE UP
BUN SERPL-MCNC: 12 MG/DL — SIGNIFICANT CHANGE UP (ref 7–23)
CALCIUM SERPL-MCNC: 9.5 MG/DL — SIGNIFICANT CHANGE UP (ref 8.4–10.5)
CHLORIDE SERPL-SCNC: 103 MMOL/L — SIGNIFICANT CHANGE UP (ref 98–107)
CO2 SERPL-SCNC: 24 MMOL/L — SIGNIFICANT CHANGE UP (ref 22–31)
COLOR SPEC: YELLOW — SIGNIFICANT CHANGE UP
CREAT SERPL-MCNC: 0.98 MG/DL — SIGNIFICANT CHANGE UP (ref 0.5–1.3)
GLUCOSE SERPL-MCNC: 93 MG/DL — SIGNIFICANT CHANGE UP (ref 70–99)
GLUCOSE UR-MCNC: NEGATIVE — SIGNIFICANT CHANGE UP
HCT VFR BLD CALC: 46.7 % — SIGNIFICANT CHANGE UP (ref 39–50)
HGB BLD-MCNC: 15.3 G/DL — SIGNIFICANT CHANGE UP (ref 13–17)
HYALINE CASTS # UR AUTO: SIGNIFICANT CHANGE UP
KETONES UR-MCNC: NEGATIVE — SIGNIFICANT CHANGE UP
LEUKOCYTE ESTERASE UR-ACNC: NEGATIVE — SIGNIFICANT CHANGE UP
MCHC RBC-ENTMCNC: 32.8 % — SIGNIFICANT CHANGE UP (ref 32–36)
MCHC RBC-ENTMCNC: 33.6 PG — SIGNIFICANT CHANGE UP (ref 27–34)
MCV RBC AUTO: 102.4 FL — HIGH (ref 80–100)
NITRITE UR-MCNC: NEGATIVE — SIGNIFICANT CHANGE UP
NRBC # FLD: 0 K/UL — LOW (ref 25–125)
PH UR: 6 — SIGNIFICANT CHANGE UP (ref 5–8)
PLATELET # BLD AUTO: 246 K/UL — SIGNIFICANT CHANGE UP (ref 150–400)
PMV BLD: 10.6 FL — SIGNIFICANT CHANGE UP (ref 7–13)
POTASSIUM SERPL-MCNC: 3.8 MMOL/L — SIGNIFICANT CHANGE UP (ref 3.5–5.3)
POTASSIUM SERPL-SCNC: 3.8 MMOL/L — SIGNIFICANT CHANGE UP (ref 3.5–5.3)
PROT SERPL-MCNC: 7 G/DL — SIGNIFICANT CHANGE UP (ref 6–8.3)
PROT UR-MCNC: 30 — SIGNIFICANT CHANGE UP
RBC # BLD: 4.56 M/UL — SIGNIFICANT CHANGE UP (ref 4.2–5.8)
RBC # FLD: 12.3 % — SIGNIFICANT CHANGE UP (ref 10.3–14.5)
RBC CASTS # UR COMP ASSIST: SIGNIFICANT CHANGE UP (ref 0–?)
SODIUM SERPL-SCNC: 140 MMOL/L — SIGNIFICANT CHANGE UP (ref 135–145)
SP GR SPEC: 1.03 — SIGNIFICANT CHANGE UP (ref 1–1.04)
SQUAMOUS # UR AUTO: SIGNIFICANT CHANGE UP
UROBILINOGEN FLD QL: SIGNIFICANT CHANGE UP
WBC # BLD: 6.73 K/UL — SIGNIFICANT CHANGE UP (ref 3.8–10.5)
WBC # FLD AUTO: 6.73 K/UL — SIGNIFICANT CHANGE UP (ref 3.8–10.5)
WBC UR QL: SIGNIFICANT CHANGE UP (ref 0–?)

## 2019-03-11 PROCEDURE — 93010 ELECTROCARDIOGRAM REPORT: CPT

## 2019-03-11 NOTE — H&P PST ADULT - HISTORY OF PRESENT ILLNESS
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  Pt states had surgery 1/11 with hip flap for closure.  States shortly after surgery + infection at hip site requiring abx.  Then noticed infection of eye as well.  Pt has been wearing eye prosthesis.  Pt has been on PO clinidamycin for 3 weeks without improvement to eye though states hip infection has resolved.  Saw optho today and was told to come to ED for IV Abx.    In /77, hr 88, temp 36.5, sat 98, rr 18  Given vancomycin 1G IV x 1, NS 1L x 1.  Seen by ophtho. Pt is a 73 yr old male scheduled for Cysto Possible bladder biopsy with Dr Caicedo 3/26/19 - pt has hx of bladder ca dx several years ago and Colon ca with removal of polyps 2012 but w/o chemo or RT - follow up evaluation of bladder done and need for biopsy determined. Pt is poor historian - pt denies pain or hematuria at present. Pt hx of Hep C - pt states successful treatment 2012 - pt also hx of loss of left eye vision with prosthesis

## 2019-03-11 NOTE — H&P PST ADULT - NEGATIVE NEUROLOGICAL SYMPTOMS
no syncope/no weakness/no generalized seizures/no focal seizures/no transient paralysis/no paresthesias

## 2019-03-11 NOTE — H&P PST ADULT - MUSCULOSKELETAL
details… detailed exam decreased ROM/pt has mild ROM restriction to right leg with diminished strength/diminished strength

## 2019-03-11 NOTE — H&P PST ADULT - PROBLEM SELECTOR PLAN 1
Scheduled for surgery 3/26/19   Preop instructions provided and patient verbalizes understanding.  Labs done and results pending.  Famotidine  provided with instructions.   OR Booking notified of TONY precautions and allergy to Morphine , eye prosthesis   Pt to see PCP for [facility] for past hx of Hep C and status of poor historian

## 2019-03-11 NOTE — H&P PST ADULT - PMH
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

## 2019-03-11 NOTE — H&P PST ADULT - LOCATION OF BACK PAIN
lumbar spine/pt c/o of lower back pain with last epidural injections - most recent 1/19 lumbar spine/pt c/o of lower back pain that radiates to bilateral LE and feet - tx with injections and last epidural most recent 1/19

## 2019-03-12 LAB
BACTERIA UR CULT: SIGNIFICANT CHANGE UP
SPECIMEN SOURCE: SIGNIFICANT CHANGE UP

## 2019-03-20 PROBLEM — M51.9 UNSPECIFIED THORACIC, THORACOLUMBAR AND LUMBOSACRAL INTERVERTEBRAL DISC DISORDER: Chronic | Status: ACTIVE | Noted: 2019-03-11

## 2019-03-25 ENCOUNTER — TRANSCRIPTION ENCOUNTER (OUTPATIENT)
Age: 74
End: 2019-03-25

## 2019-03-26 ENCOUNTER — OUTPATIENT (OUTPATIENT)
Dept: OUTPATIENT SERVICES | Facility: HOSPITAL | Age: 74
LOS: 1 days | Discharge: HOME | End: 2019-03-26
Payer: MEDICARE

## 2019-03-26 ENCOUNTER — APPOINTMENT (OUTPATIENT)
Dept: UROLOGY | Facility: AMBULATORY SURGERY CENTER | Age: 74
End: 2019-03-26

## 2019-03-26 VITALS
WEIGHT: 190.04 LBS | RESPIRATION RATE: 18 BRPM | SYSTOLIC BLOOD PRESSURE: 136 MMHG | DIASTOLIC BLOOD PRESSURE: 80 MMHG | HEIGHT: 66 IN | HEART RATE: 60 BPM | OXYGEN SATURATION: 97 % | TEMPERATURE: 98 F

## 2019-03-26 VITALS
OXYGEN SATURATION: 97 % | SYSTOLIC BLOOD PRESSURE: 132 MMHG | DIASTOLIC BLOOD PRESSURE: 66 MMHG | RESPIRATION RATE: 14 BRPM | HEART RATE: 65 BPM

## 2019-03-26 DIAGNOSIS — Z98.89 OTHER SPECIFIED POSTPROCEDURAL STATES: Chronic | ICD-10-CM

## 2019-03-26 DIAGNOSIS — Z98.890 OTHER SPECIFIED POSTPROCEDURAL STATES: Chronic | ICD-10-CM

## 2019-03-26 DIAGNOSIS — C67.9 MALIGNANT NEOPLASM OF BLADDER, UNSPECIFIED: ICD-10-CM

## 2019-03-26 PROCEDURE — 52000 CYSTOURETHROSCOPY: CPT

## 2019-03-26 RX ORDER — OXYCODONE HYDROCHLORIDE 5 MG/1
1 TABLET ORAL
Qty: 0 | Refills: 0 | COMMUNITY

## 2019-03-26 RX ORDER — OXYCODONE HYDROCHLORIDE 5 MG/1
1 TABLET ORAL
Qty: 5 | Refills: 0
Start: 2019-03-26 | End: 2019-03-26

## 2019-03-26 RX ORDER — SODIUM CHLORIDE 9 MG/ML
1000 INJECTION, SOLUTION INTRAVENOUS
Qty: 0 | Refills: 0 | Status: DISCONTINUED | OUTPATIENT
Start: 2019-03-26 | End: 2019-04-10

## 2019-03-26 NOTE — ASU DISCHARGE PLAN (ADULT/PEDIATRIC) - CALL YOUR DOCTOR IF YOU HAVE ANY OF THE FOLLOWING:
Bleeding that does not stop/Fever greater than (need to indicate Fahrenheit or Celsius) Fever greater than (need to indicate Fahrenheit or Celsius)/Unable to urinate/Inability to tolerate liquids or foods/Bleeding that does not stop

## 2019-06-08 NOTE — ASU PREOP CHECKLIST - LAST TOOK
Alert-The patient is alert, awake and responds to voice. The patient is oriented to time, place, and person. The triage nurse is able to obtain subjective information. clears

## 2020-01-27 NOTE — DISCHARGE NOTE ADULT - NS AS DC FOLLOWUP INST YN
[address] [place] [address] Podiatry  Return Patient Progress Note    Subjective: Pavan Garcia 70 y.o. female that presents for follow up evaluation of cyst to the right foot. Chief Complaint   Patient presents with    Check-Up     reassess right foot    Patient's treatment thus far has included steroid injection. Pain is rated 0 out of 10 and is described as none. However, patient states that she hasn't been walking as she normally does so she hasn't really stressed her foot much. Patient has been following my prescribed course of therapy as instructed. Review of Systems   Constitutional: Negative for activity change, appetite change, chills, diaphoresis, fatigue and fever. Respiratory: Negative for shortness of breath. Cardiovascular: Negative for leg swelling. Gastrointestinal: Negative for diarrhea and nausea. Endocrine: Negative for cold intolerance, heat intolerance and polyuria. Musculoskeletal: Positive for arthralgias. Negative for back pain, gait problem, joint swelling and myalgias. Skin: Negative for color change, pallor, rash and wound. Allergic/Immunologic: Negative for environmental allergies and food allergies. Neurological: Negative for dizziness, weakness, light-headedness and numbness. Hematological: Does not bruise/bleed easily. Psychiatric/Behavioral: Negative for behavioral problems, confusion and self-injury. The patient is not nervous/anxious. Objective: Clinical evaluation of the patient reveals a continued subcutaneous nodule to the dorsal aspect of the right foot. There remains pain with palpation to this area. This area is fluctuant, but the cyst is not freely movable. The cyst does transilluminate. There is also some palpable bone spur noted to this area. Assessment:    Diagnosis Orders   1. Ganglion cyst of foot     2. Pain in right foot           Plan: 1. Clinical evaluation of the patient.  2. Patient advised to resume normal activity and if this causes a return of pain, then I would recommend removal of the cyst. Patient states that she understands this. 3. Return if symptoms worsen or fail to improve.    1/27/2020      Venus Simon DPM Yes

## 2020-06-10 ENCOUNTER — APPOINTMENT (OUTPATIENT)
Dept: UROLOGY | Facility: CLINIC | Age: 75
End: 2020-06-10
Payer: MEDICARE

## 2020-06-10 VITALS
BODY MASS INDEX: 29.25 KG/M2 | OXYGEN SATURATION: 96 % | DIASTOLIC BLOOD PRESSURE: 85 MMHG | RESPIRATION RATE: 14 BRPM | WEIGHT: 193 LBS | HEIGHT: 68 IN | SYSTOLIC BLOOD PRESSURE: 137 MMHG | HEART RATE: 73 BPM | TEMPERATURE: 97.5 F

## 2020-06-10 PROCEDURE — 99213 OFFICE O/P EST LOW 20 MIN: CPT

## 2020-06-10 NOTE — LETTER BODY
[Dear  ___] : Dear  [unfilled], [Consult Letter:] : I had the pleasure of evaluating your patient, [unfilled]. [Consult Closing:] : Thank you very much for allowing me to participate in the care of this patient.  If you have any questions, please do not hesitate to contact me. [FreeTextEntry1] : Address: [address] [place] [address]\par Tel: [phone]\par

## 2020-06-10 NOTE — REVIEW OF SYSTEMS
[Eyesight Problems] : eyesight problems [see HPI] : see HPI [Nocturia] : nocturia [Negative] : Gastrointestinal

## 2020-06-10 NOTE — HISTORY OF PRESENT ILLNESS
[FreeTextEntry1] : He is a 74 year-old man who is in today in followup for bladder cancer. He has nocturia 3-4 times but it does not bother him. He tries to stay hydrated. Residual urine today was 50 cc. He is not complaining of dysuria, hematuria or flank pain. He has annual cystoscopies every year under anesthesia since he cannot tolerate it in the office. Last cystoscopy was in March 2019. PSA level was 0.8 in August 2019, seen on his mobile device.\par \par Previous note: In 2012 he underwent resection of a bladder tumor. Since he is unable to tolerate cystoscopy in the office, he undergoes cystoscopy with anesthesia. Cystoscopy and bladder biopsy in July 2015 were negative. In December 2012, pathology report showed papillary urothelial carcinoma, low-grade and noninvasive.

## 2020-06-10 NOTE — ASSESSMENT
[FreeTextEntry1] : He is not bothered by urinary symptoms. Repeat cystoscopy will be scheduled in the future. PSA level was normal. Urine studies will be sent.\par \par oTdd Caicedo MD, FACS\par [facility]\par  of Urology\par \par [address] [facility], [address]\[place] [address]\par \par [address] [facility]\[place] [address]\par \par Tel: [phone]\par Fax: [phone]

## 2020-06-10 NOTE — PHYSICAL EXAM
[General Appearance - Well Developed] : well developed [General Appearance - Well Nourished] : well nourished [Normal Appearance] : normal appearance [Well Groomed] : well groomed [General Appearance - In No Acute Distress] : no acute distress [Abdomen Tenderness] : non-tender [Abdomen Soft] : soft [Costovertebral Angle Tenderness] : no ~M costovertebral angle tenderness [Urethral Meatus] : meatus normal [Penis Abnormality] : normal uncircumcised penis [Urinary Bladder Findings] : the bladder was normal on palpation [Scrotum] : the scrotum was normal [Epididymis] : the epididymides were normal [Testes Tenderness] : no tenderness of the testes [Testes Mass (___cm)] : there were no testicular masses [Prostate Enlargement] : the prostate was not enlarged [No Prostate Nodules] : no prostate nodules [Prostate Tenderness] : the prostate was not tender [FreeTextEntry1] : ELMER done 6/2020 [] : no respiratory distress [Respiration, Rhythm And Depth] : normal respiratory rhythm and effort [Exaggerated Use Of Accessory Muscles For Inspiration] : no accessory muscle use

## 2020-06-11 LAB — URINE CYTOLOGY: NORMAL

## 2020-06-12 LAB — BACTERIA UR CULT: NORMAL

## 2020-07-02 RX ORDER — SODIUM CHLORIDE 9 MG/ML
3 INJECTION INTRAMUSCULAR; INTRAVENOUS; SUBCUTANEOUS EVERY 8 HOURS
Refills: 0 | Status: DISCONTINUED | OUTPATIENT
Start: 2020-07-07 | End: 2020-07-22

## 2020-07-02 RX ORDER — LIDOCAINE HCL 20 MG/ML
0.2 VIAL (ML) INJECTION ONCE
Refills: 0 | Status: DISCONTINUED | OUTPATIENT
Start: 2020-07-07 | End: 2020-07-22

## 2020-07-04 ENCOUNTER — APPOINTMENT (OUTPATIENT)
Dept: DISASTER EMERGENCY | Facility: CLINIC | Age: 75
End: 2020-07-04

## 2020-07-04 LAB — SARS-COV-2 N GENE NPH QL NAA+PROBE: NOT DETECTED

## 2020-07-06 ENCOUNTER — TRANSCRIPTION ENCOUNTER (OUTPATIENT)
Age: 75
End: 2020-07-06

## 2020-07-06 RX ORDER — ONDANSETRON 8 MG/1
4 TABLET, FILM COATED ORAL ONCE
Refills: 0 | Status: DISCONTINUED | OUTPATIENT
Start: 2020-07-07 | End: 2020-07-22

## 2020-07-06 RX ORDER — SODIUM CHLORIDE 9 MG/ML
1000 INJECTION, SOLUTION INTRAVENOUS
Refills: 0 | Status: DISCONTINUED | OUTPATIENT
Start: 2020-07-07 | End: 2020-07-22

## 2020-07-07 ENCOUNTER — OUTPATIENT (OUTPATIENT)
Dept: OUTPATIENT SERVICES | Facility: HOSPITAL | Age: 75
LOS: 1 days | End: 2020-07-07
Payer: MEDICARE

## 2020-07-07 ENCOUNTER — APPOINTMENT (OUTPATIENT)
Dept: UROLOGY | Facility: HOSPITAL | Age: 75
End: 2020-07-07

## 2020-07-07 ENCOUNTER — RESULT REVIEW (OUTPATIENT)
Age: 75
End: 2020-07-07

## 2020-07-07 VITALS
OXYGEN SATURATION: 98 % | DIASTOLIC BLOOD PRESSURE: 73 MMHG | HEART RATE: 82 BPM | SYSTOLIC BLOOD PRESSURE: 151 MMHG | RESPIRATION RATE: 17 BRPM

## 2020-07-07 VITALS
OXYGEN SATURATION: 99 % | HEIGHT: 68 IN | DIASTOLIC BLOOD PRESSURE: 82 MMHG | SYSTOLIC BLOOD PRESSURE: 171 MMHG | HEART RATE: 69 BPM | TEMPERATURE: 97 F | RESPIRATION RATE: 18 BRPM | WEIGHT: 186.73 LBS

## 2020-07-07 DIAGNOSIS — Z98.89 OTHER SPECIFIED POSTPROCEDURAL STATES: Chronic | ICD-10-CM

## 2020-07-07 DIAGNOSIS — Z98.890 OTHER SPECIFIED POSTPROCEDURAL STATES: Chronic | ICD-10-CM

## 2020-07-07 DIAGNOSIS — C67.9 MALIGNANT NEOPLASM OF BLADDER, UNSPECIFIED: ICD-10-CM

## 2020-07-07 LAB
ALBUMIN SERPL ELPH-MCNC: 3.9 G/DL — SIGNIFICANT CHANGE UP (ref 3.3–5)
ALP SERPL-CCNC: 50 U/L — SIGNIFICANT CHANGE UP (ref 40–120)
ALT FLD-CCNC: 15 U/L — SIGNIFICANT CHANGE UP (ref 10–45)
ANION GAP SERPL CALC-SCNC: 11 MMOL/L — SIGNIFICANT CHANGE UP (ref 5–17)
AST SERPL-CCNC: 33 U/L — SIGNIFICANT CHANGE UP (ref 10–40)
BILIRUB SERPL-MCNC: 0.4 MG/DL — SIGNIFICANT CHANGE UP (ref 0.2–1.2)
BUN SERPL-MCNC: 18 MG/DL — SIGNIFICANT CHANGE UP (ref 7–23)
CALCIUM SERPL-MCNC: 8.9 MG/DL — SIGNIFICANT CHANGE UP (ref 8.4–10.5)
CHLORIDE SERPL-SCNC: 107 MMOL/L — SIGNIFICANT CHANGE UP (ref 96–108)
CO2 SERPL-SCNC: 21 MMOL/L — LOW (ref 22–31)
CREAT SERPL-MCNC: 0.78 MG/DL — SIGNIFICANT CHANGE UP (ref 0.5–1.3)
GLUCOSE SERPL-MCNC: 101 MG/DL — HIGH (ref 70–99)
HCT VFR BLD CALC: 45.3 % — SIGNIFICANT CHANGE UP (ref 39–50)
HGB BLD-MCNC: 15.1 G/DL — SIGNIFICANT CHANGE UP (ref 13–17)
MCHC RBC-ENTMCNC: 33.3 GM/DL — SIGNIFICANT CHANGE UP (ref 32–36)
MCHC RBC-ENTMCNC: 33.7 PG — SIGNIFICANT CHANGE UP (ref 27–34)
MCV RBC AUTO: 101.1 FL — HIGH (ref 80–100)
NRBC # BLD: 0 /100 WBCS — SIGNIFICANT CHANGE UP (ref 0–0)
PLATELET # BLD AUTO: 187 K/UL — SIGNIFICANT CHANGE UP (ref 150–400)
POTASSIUM SERPL-MCNC: 4.7 MMOL/L — SIGNIFICANT CHANGE UP (ref 3.5–5.3)
POTASSIUM SERPL-SCNC: 4.7 MMOL/L — SIGNIFICANT CHANGE UP (ref 3.5–5.3)
PROT SERPL-MCNC: 6.9 G/DL — SIGNIFICANT CHANGE UP (ref 6–8.3)
RBC # BLD: 4.48 M/UL — SIGNIFICANT CHANGE UP (ref 4.2–5.8)
RBC # FLD: 12.3 % — SIGNIFICANT CHANGE UP (ref 10.3–14.5)
SODIUM SERPL-SCNC: 139 MMOL/L — SIGNIFICANT CHANGE UP (ref 135–145)
WBC # BLD: 6.35 K/UL — SIGNIFICANT CHANGE UP (ref 3.8–10.5)
WBC # FLD AUTO: 6.35 K/UL — SIGNIFICANT CHANGE UP (ref 3.8–10.5)

## 2020-07-07 PROCEDURE — 88305 TISSUE EXAM BY PATHOLOGIST: CPT | Mod: 26

## 2020-07-07 PROCEDURE — 88305 TISSUE EXAM BY PATHOLOGIST: CPT

## 2020-07-07 PROCEDURE — 52240 CYSTOSCOPY AND TREATMENT: CPT

## 2020-07-07 PROCEDURE — 52235 CYSTOSCOPY AND TREATMENT: CPT

## 2020-07-07 PROCEDURE — 80053 COMPREHEN METABOLIC PANEL: CPT

## 2020-07-07 PROCEDURE — 85027 COMPLETE CBC AUTOMATED: CPT

## 2020-07-07 RX ORDER — CEFAZOLIN SODIUM 1 G
2000 VIAL (EA) INJECTION ONCE
Refills: 0 | Status: COMPLETED | OUTPATIENT
Start: 2020-07-07 | End: 2020-07-07

## 2020-07-07 NOTE — ASU PATIENT PROFILE, ADULT - PMH
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

## 2020-07-07 NOTE — PRE-ANESTHESIA EVALUATION ADULT - MALLAMPATI CLASS
upper left central incisor chipped/Class III - visualization of the soft palate and the base of the uvula

## 2020-07-07 NOTE — ASU DISCHARGE PLAN (ADULT/PEDIATRIC) - ASU DC SPECIAL INSTRUCTIONSFT
Please follow-up with Dr. Caicedo on Thursday to have your catheter removed.    Some blood tinged urine is to be expected.  You may feel the sensation to urinate as your bladder becomes acclimated to the willard catheter, this is normal.

## 2020-07-07 NOTE — ASU DISCHARGE PLAN (ADULT/PEDIATRIC) - NURSING INSTRUCTIONS
Next dose of Tylenol will be on or after ___________ ,today/tonight, If needed for pain/cramps. Your first dose of Tylenol was given at ___________. Do not exceed more than 4000mg of Tylenol in one 24 hour setting.   Next dose of NSAIDS (Motrin/Alleve) will be on or after ___________ today if needed for pain/cramps.

## 2020-07-07 NOTE — PRE-ANESTHESIA EVALUATION ADULT - NSANTHDIETYNSD_GEN_ALL_CORE
Pt awaiting admission room she has been medicated and aware of plan of care. Pt remains keenly alert.   Yes/9pm, 7/6

## 2020-07-07 NOTE — BRIEF OPERATIVE NOTE - NSICDXBRIEFPROCEDURE_GEN_ALL_CORE_FT
PROCEDURES:  Cystoscopy with transurethral resection of bladder tumor (TURBT), with instillation of mitomycin C if indicated 07-Jul-2020 11:44:51  Mark Swenson

## 2020-07-07 NOTE — ASU DISCHARGE PLAN (ADULT/PEDIATRIC) - CARE PROVIDER_API CALL
Todd Caicedo)  Urology  [address] [place] [address]  Phone: [phone]  Fax: [phone]  Follow Up Time:

## 2020-07-07 NOTE — PRE-ANESTHESIA EVALUATION ADULT - NSANTHPMHFT_GEN_ALL_CORE
GERD, stopped the Nexium over 20 years; now rarely gets any symptoms; says he his heart "misses a beat" every now and again (cardiologist said not to worry). Sciatica prevents him for standing for more than 15 minutes.

## 2020-07-08 LAB — SURGICAL PATHOLOGY STUDY: SIGNIFICANT CHANGE UP

## 2020-07-09 ENCOUNTER — APPOINTMENT (OUTPATIENT)
Dept: UROLOGY | Facility: CLINIC | Age: 75
End: 2020-07-09
Payer: MEDICARE

## 2020-07-09 VITALS
SYSTOLIC BLOOD PRESSURE: 146 MMHG | WEIGHT: 193 LBS | RESPIRATION RATE: 14 BRPM | HEART RATE: 70 BPM | TEMPERATURE: 98.4 F | DIASTOLIC BLOOD PRESSURE: 91 MMHG | OXYGEN SATURATION: 94 % | BODY MASS INDEX: 29.25 KG/M2 | HEIGHT: 68 IN

## 2020-07-09 PROCEDURE — 99214 OFFICE O/P EST MOD 30 MIN: CPT

## 2020-07-09 NOTE — LETTER BODY
[Dear  ___] : Dear  [unfilled], [Consult Letter:] : I had the pleasure of evaluating your patient, [unfilled]. [Consult Closing:] : Thank you very much for allowing me to participate in the care of this patient.  If you have any questions, please do not hesitate to contact me. [FreeTextEntry1] : Address: [address] [place] [address]\par Tel: [phone]\par

## 2020-07-09 NOTE — PHYSICAL EXAM
[General Appearance - Well Developed] : well developed [General Appearance - Well Nourished] : well nourished [Normal Appearance] : normal appearance [Well Groomed] : well groomed [General Appearance - In No Acute Distress] : no acute distress [Abdomen Soft] : soft [Abdomen Tenderness] : non-tender [Urethral Meatus] : meatus normal [Penis Abnormality] : normal uncircumcised penis [Costovertebral Angle Tenderness] : no ~M costovertebral angle tenderness [Scrotum] : the scrotum was normal [Urinary Bladder Findings] : the bladder was normal on palpation [Prostate Enlargement] : the prostate was not enlarged [Epididymis] : the epididymides were normal [Testes Mass (___cm)] : there were no testicular masses [Testes Tenderness] : no tenderness of the testes [No Prostate Nodules] : no prostate nodules [Prostate Tenderness] : the prostate was not tender [FreeTextEntry1] : ELMER done 6/2020-Cheney with clear urine [] : no respiratory distress [Respiration, Rhythm And Depth] : normal respiratory rhythm and effort [Exaggerated Use Of Accessory Muscles For Inspiration] : no accessory muscle use [Inguinal Lymph Nodes Enlarged Bilaterally] : inguinal

## 2020-07-09 NOTE — ASSESSMENT
[FreeTextEntry1] : Urine was clear and Cheney catheter was removed. The pathology report was discussed in detail. CT scan was ordered. I have recommended considering intravesical therapy. Patient does not want to proceed with any intervention in the office because he is concerned about pain. He was informed that lidocaine gel will be used. He still does not want to have any catheterization done in the office setting. Risk of recurrence discussed. Alternatively, we could continue with closer surveillance cystoscopy in 3 months which he agrees to do. Also the risks of intravesical therapy with chemotherapy or immunotherapy were discussed. Questions and concerns were answered.\par \par Todd Caicedo MD, FACS\par [facility]\par  of Urology\par \par [address] [facility], [address]\[place] [address]\par \par [address] [facility], [address]\[place] [address]\par \par Tel: [phone]\par Fax: [phone]

## 2020-07-09 NOTE — HISTORY OF PRESENT ILLNESS
[FreeTextEntry1] : He is a 74 year-old man who is in today in followup for bladder cancer. On July 7, 2020, he underwent surveillance cystoscopy in the operating room and was found to have a large bladder tumor. Bladder tumor was resected and he is here today to remove the Cheney catheter. Urine is clear yellow. Pathology report showed noninvasive high-grade urothelial carcinoma. He usually has nocturia 3-4 times which does not bother him. Usually cystoscopies are done under anesthesia since he cannot tolerate them in the office. PSA level was 0.8 in August 2019, seen on his mobile device. Urine cytology was atypical.\par \par Previous note: In 2012 he underwent resection of a bladder tumor. Since he is unable to tolerate cystoscopy in the office, he undergoes cystoscopy with anesthesia. Cystoscopy and bladder biopsy in July 2015 were negative. In December 2012, pathology report showed papillary urothelial carcinoma, low-grade and noninvasive.

## 2020-07-14 ENCOUNTER — APPOINTMENT (OUTPATIENT)
Dept: CT IMAGING | Facility: CLINIC | Age: 75
End: 2020-07-14
Payer: MEDICARE

## 2020-07-14 ENCOUNTER — OUTPATIENT (OUTPATIENT)
Dept: OUTPATIENT SERVICES | Facility: HOSPITAL | Age: 75
LOS: 1 days | End: 2020-07-14
Payer: MEDICARE

## 2020-07-14 DIAGNOSIS — C67.9 MALIGNANT NEOPLASM OF BLADDER, UNSPECIFIED: ICD-10-CM

## 2020-07-14 DIAGNOSIS — Z98.890 OTHER SPECIFIED POSTPROCEDURAL STATES: Chronic | ICD-10-CM

## 2020-07-14 DIAGNOSIS — Z98.89 OTHER SPECIFIED POSTPROCEDURAL STATES: Chronic | ICD-10-CM

## 2020-07-14 PROCEDURE — 74178 CT ABD&PLV WO CNTR FLWD CNTR: CPT

## 2020-07-14 PROCEDURE — 74178 CT ABD&PLV WO CNTR FLWD CNTR: CPT | Mod: 26

## 2020-09-18 ENCOUNTER — APPOINTMENT (OUTPATIENT)
Dept: UROLOGY | Facility: CLINIC | Age: 75
End: 2020-09-18
Payer: MEDICARE

## 2020-09-18 VITALS
HEIGHT: 68 IN | WEIGHT: 179 LBS | BODY MASS INDEX: 27.13 KG/M2 | DIASTOLIC BLOOD PRESSURE: 70 MMHG | SYSTOLIC BLOOD PRESSURE: 120 MMHG | TEMPERATURE: 98.1 F

## 2020-09-18 DIAGNOSIS — R21 RASH AND OTHER NONSPECIFIC SKIN ERUPTION: ICD-10-CM

## 2020-09-18 PROCEDURE — 99214 OFFICE O/P EST MOD 30 MIN: CPT

## 2020-09-18 NOTE — PHYSICAL EXAM
[General Appearance - Well Developed] : well developed [General Appearance - Well Nourished] : well nourished [Normal Appearance] : normal appearance [Well Groomed] : well groomed [General Appearance - In No Acute Distress] : no acute distress [Abdomen Soft] : soft [Abdomen Tenderness] : non-tender [Costovertebral Angle Tenderness] : no ~M costovertebral angle tenderness [Urethral Meatus] : meatus normal [Penis Abnormality] : normal uncircumcised penis [Urinary Bladder Findings] : the bladder was normal on palpation [Scrotum] : the scrotum was normal [Epididymis] : the epididymides were normal [Testes Tenderness] : no tenderness of the testes [Testes Mass (___cm)] : there were no testicular masses [Prostate Enlargement] : the prostate was not enlarged [Prostate Tenderness] : the prostate was not tender [No Prostate Nodules] : no prostate nodules [FreeTextEntry1] : ELMER done 6/2020-erythema of the left groin and scrotum.  [] : no respiratory distress [Respiration, Rhythm And Depth] : normal respiratory rhythm and effort [Exaggerated Use Of Accessory Muscles For Inspiration] : no accessory muscle use [Oriented To Time, Place, And Person] : oriented to person, place, and time [Affect] : the affect was normal [Mood] : the mood was normal [Not Anxious] : not anxious

## 2020-09-18 NOTE — LETTER BODY
[Dear  ___] : Dear  [unfilled], [Consult Letter:] : I had the pleasure of evaluating your patient, [unfilled]. [Consult Closing:] : Thank you very much for allowing me to participate in the care of this patient.  If you have any questions, please do not hesitate to contact me. [FreeTextEntry1] : Address: [address] [place] [address]\par Tel: [phone]\par

## 2020-09-18 NOTE — ASSESSMENT
[FreeTextEntry1] : Bladder pathology report was reviewed again. Strongly suggest to consider intravesical therapy. He will think about it. He is scheduled for repeat cystoscopy in October. Bladder cancer grading and staging plus treatment and followup surveillance cystoscopies were reviewed. CT scan findings were reviewed. Urine is light red to pink today. Urine culture will be submitted. Stay hydrated. For groin rash, Mycolog cream twice a day for 10 days was prescribed. Questions and concerns were answered.\par \par Todd Caicedo MD, FACS\par [facility]\par  of Urology\[address], [address]\[place] [address]\par Tel: [phone]\par Fax: [phone]\par

## 2020-09-18 NOTE — HISTORY OF PRESENT ILLNESS
[FreeTextEntry1] : He is a 74 year-old man who is in today in followup for bladder cancer. On July 7, 2020, he underwent bladder tumor resection. Pathology report showed noninvasive high-grade urothelial carcinoma. He was doing well until a couple of days ago when he started having blood in the urine. Now it is slowly clearing. He otherwise does not have significant urinary symptoms. Usually nocturia is 3 times. He decided not to have intravesical therapy in the office and elected for repeat cystoscopy which is scheduled for October 2020. CT scan after surgery showed anterior bladder wall thickening with trace infiltrative changes in the perivesical fat. Tumor resection was done in this area. He also had a left renal cyst which was 4 cm. He has lost 30-40 pounds in the last few months. He had a similar weight loss episode in 2015. He has not been eating breakfast and dinner recently. CT scan in 2015 showed also a left 4 cm renal cyst. Prostate was mildly enlarged. PSA level was 0.8 in August 2019, seen on his mobile device. Urine cytology was atypical. He has a groin rash for several weeks which is bothering him.\par \par Previous note: In 2012 he underwent resection of a bladder tumor. Since he is unable to tolerate cystoscopy in the office, he undergoes cystoscopy with anesthesia. Cystoscopy and bladder biopsy in July 2015 were negative. In December 2012, pathology report showed papillary urothelial carcinoma, low-grade and noninvasive.

## 2020-09-21 LAB — BACTERIA UR CULT: ABNORMAL

## 2020-09-29 ENCOUNTER — OUTPATIENT (OUTPATIENT)
Dept: OUTPATIENT SERVICES | Facility: HOSPITAL | Age: 75
LOS: 1 days | End: 2020-09-29
Payer: MEDICARE

## 2020-09-29 VITALS
OXYGEN SATURATION: 97 % | WEIGHT: 171.08 LBS | RESPIRATION RATE: 18 BRPM | HEIGHT: 68 IN | HEART RATE: 75 BPM | DIASTOLIC BLOOD PRESSURE: 78 MMHG | SYSTOLIC BLOOD PRESSURE: 125 MMHG | TEMPERATURE: 98 F

## 2020-09-29 DIAGNOSIS — C67.9 MALIGNANT NEOPLASM OF BLADDER, UNSPECIFIED: ICD-10-CM

## 2020-09-29 DIAGNOSIS — Z01.818 ENCOUNTER FOR OTHER PREPROCEDURAL EXAMINATION: ICD-10-CM

## 2020-09-29 DIAGNOSIS — Z98.890 OTHER SPECIFIED POSTPROCEDURAL STATES: Chronic | ICD-10-CM

## 2020-09-29 DIAGNOSIS — Z98.89 OTHER SPECIFIED POSTPROCEDURAL STATES: Chronic | ICD-10-CM

## 2020-09-29 DIAGNOSIS — D49.4 NEOPLASM OF UNSPECIFIED BEHAVIOR OF BLADDER: ICD-10-CM

## 2020-09-29 LAB
ANION GAP SERPL CALC-SCNC: 11 MMOL/L — SIGNIFICANT CHANGE UP (ref 5–17)
BUN SERPL-MCNC: 12 MG/DL — SIGNIFICANT CHANGE UP (ref 7–23)
CALCIUM SERPL-MCNC: 9.4 MG/DL — SIGNIFICANT CHANGE UP (ref 8.4–10.5)
CHLORIDE SERPL-SCNC: 104 MMOL/L — SIGNIFICANT CHANGE UP (ref 96–108)
CO2 SERPL-SCNC: 24 MMOL/L — SIGNIFICANT CHANGE UP (ref 22–31)
CREAT SERPL-MCNC: 0.8 MG/DL — SIGNIFICANT CHANGE UP (ref 0.5–1.3)
GLUCOSE SERPL-MCNC: 106 MG/DL — HIGH (ref 70–99)
HCT VFR BLD CALC: 44.3 % — SIGNIFICANT CHANGE UP (ref 39–50)
HGB BLD-MCNC: 14.8 G/DL — SIGNIFICANT CHANGE UP (ref 13–17)
MCHC RBC-ENTMCNC: 33.4 GM/DL — SIGNIFICANT CHANGE UP (ref 32–36)
MCHC RBC-ENTMCNC: 33.8 PG — SIGNIFICANT CHANGE UP (ref 27–34)
MCV RBC AUTO: 101.1 FL — HIGH (ref 80–100)
NRBC # BLD: 0 /100 WBCS — SIGNIFICANT CHANGE UP (ref 0–0)
PLATELET # BLD AUTO: 206 K/UL — SIGNIFICANT CHANGE UP (ref 150–400)
POTASSIUM SERPL-MCNC: 4.8 MMOL/L — SIGNIFICANT CHANGE UP (ref 3.5–5.3)
POTASSIUM SERPL-SCNC: 4.8 MMOL/L — SIGNIFICANT CHANGE UP (ref 3.5–5.3)
RBC # BLD: 4.38 M/UL — SIGNIFICANT CHANGE UP (ref 4.2–5.8)
RBC # FLD: 12.6 % — SIGNIFICANT CHANGE UP (ref 10.3–14.5)
SODIUM SERPL-SCNC: 139 MMOL/L — SIGNIFICANT CHANGE UP (ref 135–145)
WBC # BLD: 6.54 K/UL — SIGNIFICANT CHANGE UP (ref 3.8–10.5)
WBC # FLD AUTO: 6.54 K/UL — SIGNIFICANT CHANGE UP (ref 3.8–10.5)

## 2020-09-29 PROCEDURE — 80048 BASIC METABOLIC PNL TOTAL CA: CPT

## 2020-09-29 PROCEDURE — 85027 COMPLETE CBC AUTOMATED: CPT

## 2020-09-29 PROCEDURE — 87086 URINE CULTURE/COLONY COUNT: CPT

## 2020-09-29 PROCEDURE — G0463: CPT

## 2020-09-29 PROCEDURE — 87186 SC STD MICRODIL/AGAR DIL: CPT

## 2020-09-29 RX ORDER — SODIUM CHLORIDE 9 MG/ML
3 INJECTION INTRAMUSCULAR; INTRAVENOUS; SUBCUTANEOUS EVERY 8 HOURS
Refills: 0 | Status: DISCONTINUED | OUTPATIENT
Start: 2020-10-06 | End: 2020-10-20

## 2020-09-29 RX ORDER — LIDOCAINE HCL 20 MG/ML
0.2 VIAL (ML) INJECTION ONCE
Refills: 0 | Status: DISCONTINUED | OUTPATIENT
Start: 2020-10-06 | End: 2020-10-20

## 2020-09-29 RX ORDER — CEFAZOLIN SODIUM 1 G
2000 VIAL (EA) INJECTION ONCE
Refills: 0 | Status: DISCONTINUED | OUTPATIENT
Start: 2020-10-06 | End: 2020-10-20

## 2020-09-29 NOTE — H&P PST ADULT - NEGATIVE ALLERGY TYPES
no outdoor environmental allergies/no reactions to animals/no indoor environmental allergies/no reactions to food

## 2020-09-29 NOTE — H&P PST ADULT - NSANTHOSAYNRD_GEN_A_CORE
Pt reports TONY test was negative/No. TONY screening performed.  STOP BANG Legend: 0-2 = LOW Risk; 3-4 = INTERMEDIATE Risk; 5-8 = HIGH Risk

## 2020-09-29 NOTE — H&P PST ADULT - OPHTHALMOLOGIC COMMENTS
cataract surgery, had reconstruction on left eye (total of 9 surgeries) remains blind in L eye cataract surgery, had reconstruction on left eye (total of 9 surgeries) remains blind in L eye, left eye prosthesis

## 2020-09-29 NOTE — H&P PST ADULT - NSICDXPASTSURGICALHX_GEN_ALL_CORE_FT
PAST SURGICAL HISTORY:  H/O eye surgery 1/2017    History of cystoscopy TURBT - multiple procedures ( last procedure 2/2016)  Cystoscopy and bladder Biopsy -  7/2020

## 2020-09-29 NOTE — H&P PST ADULT - HISTORY OF PRESENT ILLNESS
Mr. Miranda is a 74 year old man with PMH Smoker, Hepatitis C with successful treatment, diverticulosis, colon cancer s/p polypectomy no chemo or RT in 2012, BPH and bladder cancer s/p TURBT 2016 scheduled for yearly  s/p cystoscopy with bladder biopsy on 7/7/2020. Pt noted with hematuria followed by urology -pos urine culture which is treated Presents to [facility] for scheduled Cystoscopy Transurethral Resection Of Bladder Tumor on 10/6/20.    COVID pending.  Covid test negative 10/3/20 Mr. Miranda is a 74 year old man with PMH current Smoker, Hepatitis C with successful treatment, diverticulosis, colon cancer s/p polypectomy no chemo or RT in 2012, BPH and bladder cancer s/p TURBT 2016 scheduled for yearly/ s/p cystoscopy with bladder biopsy on 7/7/2020. Pt noted with hematuria followed by urology -pos urine culture which is treated Presents to [facility] for scheduled Cystoscopy Transurethral Resection Of Bladder Tumor on 10/6/20.    COVID pending.  Covid test negative 10/3/20  repeated Urine culture at PST

## 2020-09-29 NOTE — H&P PST ADULT - NSICDXFAMILYHX_GEN_ALL_CORE_FT
FAMILY HISTORY:  Father  Still living? Unknown  Family history of Alzheimer's disease, Age at diagnosis: Age Unknown  Family history of early CAD, Age at diagnosis: Age Unknown

## 2020-09-29 NOTE — H&P PST ADULT - NSICDXPASTMEDICALHX_GEN_ALL_CORE_FT
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

## 2020-09-29 NOTE — H&P PST ADULT - NSICDXPROBLEM_GEN_ALL_CORE_FT
PROBLEM DIAGNOSES  Problem: Bladder tumor  Assessment and Plan: Cystoscopy. Transuretheral Resection of Bladder on 10/6/20  Pre- Op Instructions discussed   Labs sent  Covid test on 10/2 20

## 2020-10-02 RX ORDER — CEPHALEXIN 500 MG/1
500 CAPSULE ORAL
Qty: 10 | Refills: 0 | Status: DISCONTINUED | COMMUNITY
Start: 2020-09-21 | End: 2020-10-02

## 2020-10-03 ENCOUNTER — APPOINTMENT (OUTPATIENT)
Dept: DISASTER EMERGENCY | Facility: CLINIC | Age: 75
End: 2020-10-03

## 2020-10-04 LAB — SARS-COV-2 N GENE NPH QL NAA+PROBE: NOT DETECTED

## 2020-10-05 ENCOUNTER — TRANSCRIPTION ENCOUNTER (OUTPATIENT)
Age: 75
End: 2020-10-05

## 2020-10-05 ENCOUNTER — NON-APPOINTMENT (OUTPATIENT)
Age: 75
End: 2020-10-05

## 2020-10-05 ENCOUNTER — APPOINTMENT (OUTPATIENT)
Dept: CARDIOLOGY | Facility: CLINIC | Age: 75
End: 2020-10-05
Payer: MEDICARE

## 2020-10-05 VITALS
HEART RATE: 84 BPM | DIASTOLIC BLOOD PRESSURE: 60 MMHG | HEIGHT: 68 IN | SYSTOLIC BLOOD PRESSURE: 115 MMHG | OXYGEN SATURATION: 99 % | BODY MASS INDEX: 26.98 KG/M2 | WEIGHT: 178 LBS

## 2020-10-05 PROCEDURE — 93306 TTE W/DOPPLER COMPLETE: CPT

## 2020-10-05 PROCEDURE — 99204 OFFICE O/P NEW MOD 45 MIN: CPT

## 2020-10-05 PROCEDURE — 93000 ELECTROCARDIOGRAM COMPLETE: CPT

## 2020-10-05 NOTE — HISTORY OF PRESENT ILLNESS
[FreeTextEntry1] : I was asked to see this delightful patient today for Pre-op Evaluation  prior to  cystoscopy with   Dr. Caicedo at fax number   [phone].  The patient denies fever, cough, wheezing, sputum production, hemoptysis, dyspnea, congestion, diarrhea, constipation, nausea, vomiting, bright red blood per rectum, melena, angina, chest pain, palpitations, diaphoresis, PND, incontinence, frequency, urgency, dysuria, edema, joint pain, headache, weakness, numbness and dizziness. The date of the planned procedure is: 10/06/2020\par Pt with [facility] Bladder Cancer following with Dr. Caicedo. \par Pt had echo performed in office today.

## 2020-10-05 NOTE — PHYSICAL EXAM
[General Appearance - Well Developed] : well developed [Normal Appearance] : normal appearance [Well Groomed] : well groomed [General Appearance - Well Nourished] : well nourished [No Deformities] : no deformities [General Appearance - In No Acute Distress] : no acute distress [Normal Conjunctiva] : the conjunctiva exhibited no abnormalities [Eyelids - No Xanthelasma] : the eyelids demonstrated no xanthelasmas [Normal Oral Mucosa] : normal oral mucosa [No Oral Pallor] : no oral pallor [No Oral Cyanosis] : no oral cyanosis [Normal Jugular Venous A Waves Present] : normal jugular venous A waves present [Normal Jugular Venous V Waves Present] : normal jugular venous V waves present [No Jugular Venous Griffith A Waves] : no jugular venous griffith A waves [Heart Rate And Rhythm] : heart rate and rhythm were normal [Heart Sounds] : normal S1 and S2 [Murmurs] : no murmurs present [Respiration, Rhythm And Depth] : normal respiratory rhythm and effort [Exaggerated Use Of Accessory Muscles For Inspiration] : no accessory muscle use [Auscultation Breath Sounds / Voice Sounds] : lungs were clear to auscultation bilaterally [Abdomen Soft] : soft [Abdomen Tenderness] : non-tender [Abdomen Mass (___ Cm)] : no abdominal mass palpated [Abnormal Walk] : normal gait [Gait - Sufficient For Exercise Testing] : the gait was sufficient for exercise testing [Nail Clubbing] : no clubbing of the fingernails [Cyanosis, Localized] : no localized cyanosis [Petechial Hemorrhages (___cm)] : no petechial hemorrhages [Skin Color & Pigmentation] : normal skin color and pigmentation [] : no rash [No Venous Stasis] : no venous stasis [Skin Lesions] : no skin lesions [No Skin Ulcers] : no skin ulcer [No Xanthoma] : no  xanthoma was observed [Oriented To Time, Place, And Person] : oriented to person, place, and time [Affect] : the affect was normal [Mood] : the mood was normal [No Anxiety] : not feeling anxious

## 2020-10-06 ENCOUNTER — APPOINTMENT (OUTPATIENT)
Dept: UROLOGY | Facility: HOSPITAL | Age: 75
End: 2020-10-06

## 2020-10-06 ENCOUNTER — OUTPATIENT (OUTPATIENT)
Dept: OUTPATIENT SERVICES | Facility: HOSPITAL | Age: 75
LOS: 1 days | End: 2020-10-06
Payer: MEDICARE

## 2020-10-06 ENCOUNTER — RESULT REVIEW (OUTPATIENT)
Age: 75
End: 2020-10-06

## 2020-10-06 VITALS
TEMPERATURE: 98 F | HEIGHT: 68 IN | DIASTOLIC BLOOD PRESSURE: 84 MMHG | RESPIRATION RATE: 16 BRPM | OXYGEN SATURATION: 97 % | WEIGHT: 171.08 LBS | SYSTOLIC BLOOD PRESSURE: 143 MMHG | HEART RATE: 71 BPM

## 2020-10-06 VITALS
RESPIRATION RATE: 16 BRPM | OXYGEN SATURATION: 99 % | SYSTOLIC BLOOD PRESSURE: 127 MMHG | HEART RATE: 76 BPM | DIASTOLIC BLOOD PRESSURE: 67 MMHG

## 2020-10-06 DIAGNOSIS — Z98.89 OTHER SPECIFIED POSTPROCEDURAL STATES: Chronic | ICD-10-CM

## 2020-10-06 DIAGNOSIS — C67.9 MALIGNANT NEOPLASM OF BLADDER, UNSPECIFIED: ICD-10-CM

## 2020-10-06 DIAGNOSIS — Z98.890 OTHER SPECIFIED POSTPROCEDURAL STATES: Chronic | ICD-10-CM

## 2020-10-06 PROCEDURE — 52234 CYSTOSCOPY AND TREATMENT: CPT

## 2020-10-06 PROCEDURE — 88305 TISSUE EXAM BY PATHOLOGIST: CPT | Mod: 26

## 2020-10-06 PROCEDURE — 88305 TISSUE EXAM BY PATHOLOGIST: CPT

## 2020-10-06 PROCEDURE — 87086 URINE CULTURE/COLONY COUNT: CPT

## 2020-10-06 RX ORDER — ONDANSETRON 8 MG/1
4 TABLET, FILM COATED ORAL ONCE
Refills: 0 | Status: DISCONTINUED | OUTPATIENT
Start: 2020-10-06 | End: 2020-10-20

## 2020-10-06 RX ORDER — OXYCODONE HYDROCHLORIDE 5 MG/1
5 TABLET ORAL ONCE
Refills: 0 | Status: DISCONTINUED | OUTPATIENT
Start: 2020-10-06 | End: 2020-10-06

## 2020-10-06 RX ORDER — SODIUM CHLORIDE 9 MG/ML
1000 INJECTION, SOLUTION INTRAVENOUS
Refills: 0 | Status: DISCONTINUED | OUTPATIENT
Start: 2020-10-06 | End: 2020-10-20

## 2020-10-06 RX ADMIN — OXYCODONE HYDROCHLORIDE 5 MILLIGRAM(S): 5 TABLET ORAL at 14:00

## 2020-10-06 RX ADMIN — OXYCODONE HYDROCHLORIDE 5 MILLIGRAM(S): 5 TABLET ORAL at 13:21

## 2020-10-06 NOTE — BRIEF OPERATIVE NOTE - NSICDXBRIEFPROCEDURE_GEN_ALL_CORE_FT
PROCEDURES:  Cystourethroscopy with bladder tumor resection, small 06-Oct-2020 13:11:08  Humberto Parra

## 2020-10-06 NOTE — ASU DISCHARGE PLAN (ADULT/PEDIATRIC) - ASU DC SPECIAL INSTRUCTIONSFT
You may take over the counter pain medicine as needed for pain.     Complete course of ciprofloxacin that was prescribed preop.    You may have intermittent blood tinged urine. This is normal and due to the proceduere. If your urine becomes bright red or with clots, please call the office.     Please followup with Dr. Caicedo on Thursday for willard removal. Please call to confirm/schedule an appointment.

## 2020-10-06 NOTE — ASU PATIENT PROFILE, ADULT - VISION (WITH CORRECTIVE LENSES IF THE PATIENT USUALLY WEARS THEM):
Partially impaired: cannot see medication labels or newsprint, but can see obstacles in path, and the surrounding layout; can count fingers at arm's length Partially impaired: cannot see medication labels or newsprint, but can see obstacles in path, and the surrounding layout; can count fingers at arm's length/does not see out of L eye

## 2020-10-06 NOTE — BRIEF OPERATIVE NOTE - OPERATION/FINDINGS
small subcentimeter low-grade noninvasive appearing tumors scattered throughout bladder  previous resection site at the dome with velvety erythematous changes

## 2020-10-06 NOTE — ASU PATIENT PROFILE, ADULT - PSH
H/O eye surgery  1/2017  History of cystoscopy  TURBT - multiple procedures ( last procedure 2/2016)  Cystoscopy and bladder Biopsy -  7/2020

## 2020-10-06 NOTE — PRE-ANESTHESIA EVALUATION ADULT - NSANTHOSAYNRD_GEN_A_CORE
No. TONY screening performed.  STOP BANG Legend: 0-2 = LOW Risk; 3-4 = INTERMEDIATE Risk; 5-8 = HIGH Risk/Pt reports TONY test was negative

## 2020-10-06 NOTE — ASU PATIENT PROFILE, ADULT - PMH
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

## 2020-10-06 NOTE — ASU DISCHARGE PLAN (ADULT/PEDIATRIC) - CARE PROVIDER_API CALL
Todd Caicedo)  Urology  [address] [facility], [place] [address]  Phone: [phone]  Fax: [phone]  Follow Up Time:

## 2020-10-06 NOTE — ASU DISCHARGE PLAN (ADULT/PEDIATRIC) - CALL YOUR DOCTOR IF YOU HAVE ANY OF THE FOLLOWING:
Fever greater than (need to indicate Fahrenheit or Celsius)/Bleeding that does not stop/Pain not relieved by Medications/Unable to urinate

## 2020-10-08 ENCOUNTER — APPOINTMENT (OUTPATIENT)
Dept: UROLOGY | Facility: CLINIC | Age: 75
End: 2020-10-08
Payer: MEDICARE

## 2020-10-08 VITALS — TEMPERATURE: 97.5 F

## 2020-10-08 LAB
CULTURE RESULTS: SIGNIFICANT CHANGE UP
SPECIMEN SOURCE: SIGNIFICANT CHANGE UP
SURGICAL PATHOLOGY STUDY: SIGNIFICANT CHANGE UP

## 2020-10-08 PROCEDURE — 99213 OFFICE O/P EST LOW 20 MIN: CPT

## 2020-10-08 NOTE — PHYSICAL EXAM
[General Appearance - Well Developed] : well developed [General Appearance - Well Nourished] : well nourished [Normal Appearance] : normal appearance [Well Groomed] : well groomed [General Appearance - In No Acute Distress] : no acute distress [Abdomen Soft] : soft [Abdomen Tenderness] : non-tender [Costovertebral Angle Tenderness] : no ~M costovertebral angle tenderness [Urethral Meatus] : meatus normal [Penis Abnormality] : normal uncircumcised penis [Urinary Bladder Findings] : the bladder was normal on palpation [Scrotum] : the scrotum was normal [Epididymis] : the epididymides were normal [Testes Tenderness] : no tenderness of the testes [Testes Mass (___cm)] : there were no testicular masses [Prostate Enlargement] : the prostate was not enlarged [Prostate Tenderness] : the prostate was not tender [No Prostate Nodules] : no prostate nodules [FreeTextEntry1] : ELMER done 6/2020. [] : no respiratory distress [Respiration, Rhythm And Depth] : normal respiratory rhythm and effort [Exaggerated Use Of Accessory Muscles For Inspiration] : no accessory muscle use [Oriented To Time, Place, And Person] : oriented to person, place, and time [Affect] : the affect was normal [Mood] : the mood was normal [Not Anxious] : not anxious

## 2020-10-08 NOTE — HISTORY OF PRESENT ILLNESS
[FreeTextEntry1] : He is a 74 year-old man who is in today in followup for bladder cancer. On July 7, 2020, he underwent bladder tumor resection. Pathology report showed noninvasive high-grade urothelial carcinoma. Repeat resection in October 6, 2020 showed the same findings. He is here today to remove the catheter. The urine is clear. He is contemplating considering intravesical therapy.\par Usually nocturia is 3 times. CT scan after surgery showed anterior bladder wall thickening with trace infiltrative changes in the perivesical fat. He also had a left renal cyst which was 4 cm. CT scan in 2015 showed also a left 4 cm renal cyst. Prostate was mildly enlarged. PSA level was 0.8 in August 2019, seen on his mobile device. Urine cytology was atypical. \par \par Previous note: In 2012 he underwent resection of a bladder tumor. Since he is unable to tolerate cystoscopy in the office, he undergoes cystoscopy with anesthesia. Cystoscopy and bladder biopsy in July 2015 were negative. In December 2012, pathology report showed papillary urothelial carcinoma, low-grade and noninvasive.

## 2020-10-08 NOTE — ASSESSMENT
[FreeTextEntry1] : We discussed his pathology reports in detail. He will consider intravesical therapy with gemcitabine. Risks of intravesical therapy were discussed. Instructions reviewed. He will start in one month. However he would still rather have repeat cystoscopy is in the hospital setting.\par \par Todd Caicedo MD, FACS\par [facility]\par  of Urology\par [address] [place] [address], [address]\[place] [address]\par Tel: [phone]\par Fax: [phone]\par

## 2020-10-08 NOTE — LETTER BODY
[Dear  ___] : Dear  [unfilled], [Consult Letter:] : I had the pleasure of evaluating your patient, [unfilled]. [Consult Closing:] : Thank you very much for allowing me to participate in the care of this patient.  If you have any questions, please do not hesitate to contact me. [FreeTextEntry1] : Address: [address] [place] [address]\par Tel: [phone]\par

## 2020-10-22 ENCOUNTER — APPOINTMENT (OUTPATIENT)
Dept: CT IMAGING | Facility: IMAGING CENTER | Age: 75
End: 2020-10-22
Payer: MEDICARE

## 2020-10-22 ENCOUNTER — OUTPATIENT (OUTPATIENT)
Dept: OUTPATIENT SERVICES | Facility: HOSPITAL | Age: 75
LOS: 1 days | End: 2020-10-22
Payer: MEDICARE

## 2020-10-22 DIAGNOSIS — Z98.890 OTHER SPECIFIED POSTPROCEDURAL STATES: Chronic | ICD-10-CM

## 2020-10-22 DIAGNOSIS — Z98.89 OTHER SPECIFIED POSTPROCEDURAL STATES: Chronic | ICD-10-CM

## 2020-10-22 DIAGNOSIS — I77.810 THORACIC AORTIC ECTASIA: ICD-10-CM

## 2020-10-22 DIAGNOSIS — Z00.8 ENCOUNTER FOR OTHER GENERAL EXAMINATION: ICD-10-CM

## 2020-10-22 PROCEDURE — 71250 CT THORAX DX C-: CPT

## 2020-10-22 PROCEDURE — 71250 CT THORAX DX C-: CPT | Mod: 26

## 2020-10-29 ENCOUNTER — APPOINTMENT (OUTPATIENT)
Dept: CARDIOLOGY | Facility: CLINIC | Age: 75
End: 2020-10-29
Payer: MEDICARE

## 2020-10-29 VITALS
BODY MASS INDEX: 26.98 KG/M2 | HEART RATE: 61 BPM | SYSTOLIC BLOOD PRESSURE: 115 MMHG | HEIGHT: 68 IN | DIASTOLIC BLOOD PRESSURE: 70 MMHG | WEIGHT: 178 LBS | OXYGEN SATURATION: 98 %

## 2020-10-29 DIAGNOSIS — N28.1 CYST OF KIDNEY, ACQUIRED: ICD-10-CM

## 2020-10-29 PROCEDURE — 99214 OFFICE O/P EST MOD 30 MIN: CPT

## 2020-10-29 PROCEDURE — 99072 ADDL SUPL MATRL&STAF TM PHE: CPT

## 2020-10-29 RX ORDER — CIPROFLOXACIN HYDROCHLORIDE 250 MG/1
250 TABLET, FILM COATED ORAL
Qty: 10 | Refills: 0 | Status: DISCONTINUED | COMMUNITY
Start: 2020-10-02 | End: 2020-10-29

## 2020-11-02 PROBLEM — N28.1 RENAL CYST: Status: ACTIVE | Noted: 2020-10-28

## 2020-11-02 NOTE — PHYSICAL EXAM
[General Appearance - Well Developed] : well developed [Normal Appearance] : normal appearance [Well Groomed] : well groomed [General Appearance - Well Nourished] : well nourished [No Deformities] : no deformities [General Appearance - In No Acute Distress] : no acute distress [Normal Conjunctiva] : the conjunctiva exhibited no abnormalities [Eyelids - No Xanthelasma] : the eyelids demonstrated no xanthelasmas [Normal Oral Mucosa] : normal oral mucosa [No Oral Pallor] : no oral pallor [No Oral Cyanosis] : no oral cyanosis [Normal Jugular Venous A Waves Present] : normal jugular venous A waves present [Normal Jugular Venous V Waves Present] : normal jugular venous V waves present [No Jugular Venous Griffith A Waves] : no jugular venous griffith A waves [Respiration, Rhythm And Depth] : normal respiratory rhythm and effort [Exaggerated Use Of Accessory Muscles For Inspiration] : no accessory muscle use [Auscultation Breath Sounds / Voice Sounds] : lungs were clear to auscultation bilaterally [Abdomen Soft] : soft [Abdomen Tenderness] : non-tender [Abdomen Mass (___ Cm)] : no abdominal mass palpated [Abnormal Walk] : normal gait [Gait - Sufficient For Exercise Testing] : the gait was sufficient for exercise testing [Nail Clubbing] : no clubbing of the fingernails [Cyanosis, Localized] : no localized cyanosis [Petechial Hemorrhages (___cm)] : no petechial hemorrhages [Skin Color & Pigmentation] : normal skin color and pigmentation [] : no rash [No Venous Stasis] : no venous stasis [Skin Lesions] : no skin lesions [No Skin Ulcers] : no skin ulcer [No Xanthoma] : no  xanthoma was observed [Oriented To Time, Place, And Person] : oriented to person, place, and time [Affect] : the affect was normal [Mood] : the mood was normal [No Anxiety] : not feeling anxious [FreeTextEntry1] : Regular rate and rhythm, NL S1, S2, non-displaced PMI, chest non-tender; no rubs,heaves  or gallops a  Grade 2/6 systolic murmur noted at the LSB

## 2020-11-02 NOTE — HISTORY OF PRESENT ILLNESS
[FreeTextEntry1] : Pt following up s/p cystoscopy on 10/6 w/ Dr. Caicedo, states he is feeling well. Pt had CT chest on 10/22 showing TAA 4.5 cm- pt is not currently on beta blocker and left renal cyst which is 5.1 cm, previously 4.4 cm. CT chest also showed bronchiectasis, pt does not currently have a pulmonologist. The patient denies fever, chills, sore throat, loss of taste or smell, muscle aches weight loss, malaise, rash, recent travel, insect bites, alteration bowel habits, headaches, weakness, abdominal  pain, bloating, changes in urination, visual disturbances, shortness of breath, chest pain, dizziness, palpitations.\par

## 2020-11-09 ENCOUNTER — APPOINTMENT (OUTPATIENT)
Dept: UROLOGY | Facility: CLINIC | Age: 75
End: 2020-11-09
Payer: MEDICARE

## 2020-11-09 VITALS
BODY MASS INDEX: 26.98 KG/M2 | HEIGHT: 68 IN | WEIGHT: 178 LBS | HEART RATE: 53 BPM | RESPIRATION RATE: 14 BRPM | SYSTOLIC BLOOD PRESSURE: 120 MMHG | DIASTOLIC BLOOD PRESSURE: 73 MMHG | OXYGEN SATURATION: 94 % | TEMPERATURE: 98 F

## 2020-11-09 DIAGNOSIS — Z01.812 ENCOUNTER FOR PREPROCEDURAL LABORATORY EXAMINATION: ICD-10-CM

## 2020-11-09 PROCEDURE — 51720 TREATMENT OF BLADDER LESION: CPT

## 2020-11-09 PROCEDURE — 99072 ADDL SUPL MATRL&STAF TM PHE: CPT

## 2020-11-09 RX ORDER — GEMCITABINE 2 G/50ML
2 INJECTION, POWDER, LYOPHILIZED, FOR SOLUTION INTRAVENOUS
Qty: 1 | Refills: 0 | Status: COMPLETED | OUTPATIENT
Start: 2020-11-09

## 2020-11-09 RX ADMIN — GEMCITABINE 0 GM: 2 INJECTION, POWDER, LYOPHILIZED, FOR SOLUTION INTRAVENOUS at 00:00

## 2020-11-11 LAB — BACTERIA UR CULT: NORMAL

## 2020-11-16 ENCOUNTER — LABORATORY RESULT (OUTPATIENT)
Age: 75
End: 2020-11-16

## 2020-11-16 ENCOUNTER — APPOINTMENT (OUTPATIENT)
Dept: UROLOGY | Facility: CLINIC | Age: 75
End: 2020-11-16
Payer: MEDICARE

## 2020-11-16 VITALS
RESPIRATION RATE: 14 BRPM | BODY MASS INDEX: 26.98 KG/M2 | TEMPERATURE: 98.5 F | OXYGEN SATURATION: 96 % | WEIGHT: 178 LBS | HEIGHT: 68 IN | HEART RATE: 73 BPM | DIASTOLIC BLOOD PRESSURE: 65 MMHG | SYSTOLIC BLOOD PRESSURE: 110 MMHG

## 2020-11-16 PROCEDURE — 51720 TREATMENT OF BLADDER LESION: CPT

## 2020-11-16 PROCEDURE — 99072 ADDL SUPL MATRL&STAF TM PHE: CPT

## 2020-11-16 RX ORDER — GEMCITABINE 2 G/50ML
2 INJECTION, POWDER, LYOPHILIZED, FOR SOLUTION INTRAVENOUS
Qty: 1 | Refills: 0 | Status: COMPLETED | OUTPATIENT
Start: 2020-11-16

## 2020-11-16 RX ADMIN — GEMCITABINE 0 GM: 2 INJECTION, POWDER, LYOPHILIZED, FOR SOLUTION INTRAVENOUS at 00:00

## 2020-11-19 ENCOUNTER — APPOINTMENT (OUTPATIENT)
Dept: PULMONOLOGY | Facility: CLINIC | Age: 75
End: 2020-11-19
Payer: MEDICARE

## 2020-11-19 VITALS
OXYGEN SATURATION: 98 % | TEMPERATURE: 97.6 F | SYSTOLIC BLOOD PRESSURE: 120 MMHG | HEART RATE: 55 BPM | WEIGHT: 180 LBS | RESPIRATION RATE: 14 BRPM | DIASTOLIC BLOOD PRESSURE: 76 MMHG | BODY MASS INDEX: 27.28 KG/M2 | HEIGHT: 68 IN

## 2020-11-19 DIAGNOSIS — Z83.3 FAMILY HISTORY OF DIABETES MELLITUS: ICD-10-CM

## 2020-11-19 DIAGNOSIS — Z87.891 PERSONAL HISTORY OF NICOTINE DEPENDENCE: ICD-10-CM

## 2020-11-19 DIAGNOSIS — Z82.0 FAMILY HISTORY OF EPILEPSY AND OTHER DISEASES OF THE NERVOUS SYSTEM: ICD-10-CM

## 2020-11-19 PROCEDURE — 94727 GAS DIL/WSHOT DETER LNG VOL: CPT

## 2020-11-19 PROCEDURE — 99204 OFFICE O/P NEW MOD 45 MIN: CPT | Mod: 25

## 2020-11-19 PROCEDURE — 94729 DIFFUSING CAPACITY: CPT

## 2020-11-19 PROCEDURE — 94799 UNLISTED PULMONARY SVC/PX: CPT

## 2020-11-19 PROCEDURE — 94010 BREATHING CAPACITY TEST: CPT

## 2020-11-19 NOTE — PROCEDURE
[FreeTextEntry1] : SPI: WNL\par Lung Volumes: WNL\par Diffusion: WNL\par Flow volume loop mildly abnormal- obstructive pattern

## 2020-11-19 NOTE — ASSESSMENT
[FreeTextEntry1] : This is a 75 year old male with PMHx of bladder cancer s/p bladder tumor resection in 7/2020, Hep C, LVH, dilated aortic root, sciatica and now an abnormal CT chest revealing bronchiectasis who is in for initial evaluation. The plan for the patient is as follows:\par \par #1.Bronchiectasis and focal RML volume loss\par -follow up CT scan in 6 months to re-assess\par -correlation with smoking hx\par -can contribute to cough/mucus\par -discussed condition in detail with pateint\par -offered acapella to patient- not interested in use \par \par #2.GERD-stable\par -on Nexium daily\par \par #3.Poor sleep\par -related to bladder issues\par -reports last sleep test was 1 year ago and was negative for sleep apnea\par \par Pt to return in 3 months for routine follow up.

## 2020-11-19 NOTE — REASON FOR VISIT
August 6, 2019       Nat Hernandes MD  [address] [place] [address] [place] [address]  VIA Facsimile: [phone]      Patient: Jesse Saldivar   YOB: 2007   Date of Visit: 8/6/2019       Dear Dr. Hernandes:    I saw your patient, Jesse Saldivar, for an evaluation. Below are my notes for this visit with him.    If you have questions, please do not hesitate to call me.      Sincerely,        Edward Buckley MD        CC: No Recipients  Edward Buckley MD  8/6/2019  3:24 PM  Sign at close encounter  Pediatric Cardiology Consultation    Referring Physician:  Nat Hernandes MD    Chief Complaint   Patient presents with   • Follow-up   • Hyperlipidemia        SUBJECTIVE  HPI: Jesse Saldivar is a 12  Yrs 5  Mos male with history of hypercholesterolemia, here in [place] clinic with his mother for cardiac follow-up examination.    Jesse is noted to have high cholesterol, high LDL cholesterol since 2016. The last LDL cholesterol on 4/27/2019 was 245 mg/dL (0-129). He has been gaining weight rapidly. He stated that he has been trying to eat more vegetables. He loves food. He had discussion with his primary care physician regarding obesity and high cholesterol. He already had discussion with dietitian last year about healthy diet. His mother has been trying to change the diet in the household. Currently he drinks mostly water, juice every once in a while.     He does not drink pop. He is going to [facility] 2-3 times a week for basketball.  There is no apparent history of chest pain, palpitations, exercise intolerance, shortness of breath, cyanosis, or syncope. His mother has no other concern.     REVIEW OF SYSTEM  Review of Systems   Constitutional: Negative for activity change, appetite change, chills, diaphoresis, fatigue and fever.   HENT: Negative for congestion, dental problem, drooling, ear discharge, ear pain, facial swelling, hearing loss, nosebleeds, tinnitus and trouble swallowing.    Eyes:  Negative for photophobia, pain, discharge, redness, itching and visual disturbance.   Respiratory: Negative for apnea, cough, choking, wheezing and stridor.    Cardiovascular: Negative for chest pain, palpitations and leg swelling.   Gastrointestinal: Negative for abdominal distention, abdominal pain, blood in stool, constipation, diarrhea, nausea and vomiting.   Endocrine: Negative for cold intolerance, heat intolerance, polydipsia, polyphagia and polyuria.   Genitourinary: Negative for difficulty urinating, dysuria, enuresis, flank pain, frequency, hematuria and urgency.   Musculoskeletal: Negative for arthralgias, back pain, gait problem, joint swelling, myalgias, neck pain and neck stiffness.   Skin: Negative for color change, pallor and rash.   Allergic/Immunologic: Negative for environmental allergies, food allergies and immunocompromised state.   Neurological: Negative for dizziness, tremors, seizures, syncope, facial asymmetry, speech difficulty, weakness, light-headedness and headaches.   Hematological: Negative for adenopathy. Does not bruise/bleed easily.   Psychiatric/Behavioral: Negative for agitation, behavioral problems, confusion, hallucinations, self-injury and sleep disturbance. The patient is not hyperactive.      Past Medical History:   Diagnosis Date   • Anemia    • FTND (full term normal delivery)     9lbs, 10oz   • Hyperlipoproteinemia    • Vitamin D deficiency      Past Surgical History:   Procedure Laterality Date   • No past surgeries       Family History   Problem Relation Age of Onset   • Hypertension Mother    • Diabetes Mother    • Hyperlipidemia Mother    • Hypertension Father    • Hyperlipidemia Father    • Arrhythmia Neg Hx    • Myocardial Infarction Neg Hx    • Heart disease Neg Hx    • Sudden Death Neg Hx    • Stroke Neg Hx    • Long QT Syndrome Neg Hx      Social History     Socioeconomic History   • Marital status: Single     Spouse name: Not on file   • Number of children: Not  on file   • Years of education: Not on file   • Highest education level: Not on file   Occupational History   • Not on file   Social Needs   • Financial resource strain: Not on file   • Food insecurity:     Worry: Not on file     Inability: Not on file   • Transportation needs:     Medical: Not on file     Non-medical: Not on file   Tobacco Use   • Smoking status: Never Smoker   • Smokeless tobacco: Never Used   Substance and Sexual Activity   • Alcohol use: Never     Frequency: Never   • Drug use: Never   • Sexual activity: Never   Lifestyle   • Physical activity:     Days per week: Not on file     Minutes per session: Not on file   • Stress: Not on file   Relationships   • Social connections:     Talks on phone: Not on file     Gets together: Not on file     Attends Rastafarian service: Not on file     Active member of club or organization: Not on file     Attends meetings of clubs or organizations: Not on file     Relationship status: Not on file   • Intimate partner violence:     Fear of current or ex partner: Not on file     Emotionally abused: Not on file     Physically abused: Not on file     Forced sexual activity: Not on file   Other Topics Concern   • Not on file   Social History Narrative    Patients Live with     Parents    No siblings    No pets    7th Grade    Denied: History of Alcohol use    Denied: History of Drug use    Never smoker             Current Outpatient Medications   Medication Sig Dispense Refill   • atorvastatin (LIPITOR) 10 MG tablet Take 1 tablet by mouth daily. 30 tablet 6   • Ferrous Sulfate (SLOW FE PO) Take by mouth daily.     • cholecalciferol (VITAMIN D3) 1000 UNITS tablet Take 400 Units by mouth daily.       No current facility-administered medications for this visit.      ALLERGIES:  No Known Allergies    Diagnostic history:   LIPID TESTING  Component Name                 4/27/2019 7/28/2018 5/26/2018 4/29/2017 11/19/2016     Cholesterol 299 (H) 289 (H) 222 (H) 282 (H) 291 (H)      Triglycerides 77 68 51 93 49     HDL Cholesterol 39 (L) 45 44 43 44     LDL Cholesterol 245 (H) 230 (H) 168 (H) 220 (H) 237 (H)     Non-HDL Cholesterol 260 (H) 244 (H) 178 (H) 239 (H) 247 (H)     Chol/HDL Ratio 7.7 6.4 5.0 6.6 6.6          OBJECTIVE  Visit Vitals  /63   Pulse 109   Ht 5' 5.35\" (1.66 m)   Wt (!) 81.1 kg (178 lb 12.7 oz)   SpO2 98%   BMI 29.43 kg/m²     Body surface area is 1.89 meters squared.    BMI is 98 %ile (Z= 2.08) based on CDC (Boys, 2-20 Years) BMI-for-age based on BMI available as of 2019.    Blood pressure percentiles are 94 % systolic and 48 % diastolic based on the 2017 AAP Clinical Practice Guideline. Blood pressure percentile targets: 90: 125/77, 95: 129/80, 95 + 12 mmH/92. This reading is in the elevated blood pressure range (BP >= 120/80).    The height is at 78 %ile (Z= 0.76) based on CDC (Boys, 2-20 Years) Stature-for-age data based on Stature recorded on 2019.    The weight is at 99 %ile (Z= 2.26) based on CDC (Boys, 2-20 Years) weight-for-age data using vitals from 2019.    Physical Exam    Constitutional: in no acute distress and interactive.   Obese.    Head and Face: normocephalic and atraumatic. no dysmorphic features were observed.      Eyes: no discharge and normal conjunctiva. the sclerae were normal.      ENT: normal appearing outer ear and normal appearing nose. no nasal discharge. normal lips. oral mucosa pink and moist.      Neck: normal appearing neck and supple neck.      Chest: no thoracic asymmetry, no bulging precordium and no chest deformity.     Surgical Scar(s): None.      Pulmonary: no respiratory distress. no subcostal retractions. breath sounds clear to auscultation bilaterally.      Cardiovascular: The heart rate was normal. The rhythm was regular. Heart sounds: normal S1, normal S2, no gallop, no click, no pericardial rub.   Murmurs: no murmurs heard.   Radial Pulse: right 2+ and left 2+.   Posterior tibial pulse: right 2+  and left 2+. no pulse delay and capillary refill < 2 secs.   Lower Extremities: no edema present.      Abdomen: soft, nontender and nondistended. no hepatomegaly and no splenomegaly.      Lymphatic: no cervical lymphadenopathy.      Musculoskeletal: muscle strength and tone were grossly normal.      Neurologic: no coordination deficits observed and developmentally appropriate for age. the mood/affect was appropriate for age.      Skin: normal skin color and pigmentation, no bruises, no rash, no central cyanosis and no peripheral cyanosis. normal skin turgor.     Patient Active Problem List    Diagnosis Date Noted   • Body mass index 95-99% for age, obese child weight manage/multidiscipl 10/23/2018     Priority: Low   • High serum low density lipoprotein (LDL) cholesterol 10/23/2018     Priority: Low     Orders Placed This Encounter   • Comprehensive Metabolic Panel   • Lipid Panel with Reflex   • Creatine Kinase   • Coenzyme Q10 Total      Relevant Testing:  None    ASSESSMENT:  Jesse is gaining weight, 22 pounds in 6 months.  I explained to him and his mother that he needs to have regular aerobic exercise every day.  He should start running on a treadmill, 4 mph, starting from 15 to 30 minutes and gradually increase to 1 hour daily.  He should have good portion of healthy diet, more on vegetables, salads, cauliflower, broccoli, eggplant, cucumbers fruits, etc.  He should eat less carbohydrate and fatty food.    He could maintain his body weight at 178 pounds for the next 4 to 5 years.  His mother will buy a body weight scale for home.    Blood works are ordered.  He will be done at [facility] in [place] in the near future.    Final Diagnosis:   E66.9, Z68.54 Body mass index 95-99% for age, obese child weight manage/multidiscipl  (primary encounter diagnosis)  R79.89 High serum low density lipoprotein (LDL) cholesterol    RECOMMENDATIONS:  1. Feeding and Nutrition: Leading a healthy lifestyle, eating  plenty of fruits and vegetables, eating whole grain foods, minimizing junk food and exercising regularly is important for cardiac health.  2. Medications:  No new medications.  Medications to avoid:  None.   3. Diagnostic tests:  No further cardiac laboratory tests or imaging required at this time.  4. SBE prophylaxis:  Not required.  5. Immunizations:  Routine immunizations should be provided, including influenza for patients over 6 months of age.   6. Exercise restrictions:  There should be no restriction placed on her activity from a cardiac standpoint. Jesse Saldivar is clear for participation in all activities from a cardiac viewpoint, and needs no special cardiac precautions.   7. Surgical/Anesthesia Concerns:  Based on the available history and data, the patient is at no greater risk than the general population for surgery, anesthesia, or sedation.  8. Patient education:  To contact us for any new, concerning, or recurrent symptoms.  9. Follow up:  A return visit to cardiology clinic is in 6 months, unless new or concerning symptoms develop.  Routine follow up with the primary doctor is recommended.    Jesse and his mother expressed understanding and agreement with the above recommendations.  All questions were answered.    Edward Buckley MD  Pediatric Cardiology                  [Initial] : an initial visit [Abnormal CXR/ Chest CT] : an abnormal CXR/ chest CT [TextBox_13] : Dr. Ward

## 2020-11-19 NOTE — HISTORY OF PRESENT ILLNESS
[TextBox_4] : This is a 75 year old male with PMHx of bladder cancer s/p bladder tumor resection in 7/2020, Hep C, LVH, dilated aortic root, sciatica and now an abnormal CT chest revealing bronchiectasis who is in for initial evaluation. \par \par Pt reports he is in his normal state of health but would like to know more about his abnormal CT Chest results. \par He reports that he was a prior cigar smoker x at least 20 years- for the first 15 years he was smoking approx 5-10 cigars a week. In the last 5 years, he was smoking about 20 cigars a week. He quit 3 months ago. \par He worked as an  and reports prior probable exposure to asbestos- [place]. \par He reports a chronic seasonal cough in sept-oct, usually resolves on its own. He feels that HALLS has helped him greatly, no cough now. He states that he has poor sleep due to bladder issues- up multiple times a night to urinate. \par He reports he was sleep tested last year and was negative. \par He denies SOB, CURRIE, chest tightness or heaviness, denies GERD issues since he has been on nexium, HA, rashes, muscle cramping. \par He reports that he feels good for the most part and is here to discuss further recommendations on his abnormal CT results. \par \par No ETOH, no illicit drug use. \par Reports his sister had "lung issues/deterioration" but that was the extent of the diagnosis he was aware of.\par

## 2020-11-19 NOTE — PHYSICAL EXAM
[No Acute Distress] : no acute distress [Well Nourished] : well nourished [Well Groomed] : well groomed [Well Developed] : well developed [Normal Oropharynx] : normal oropharynx [Normal Appearance] : normal appearance [Supple] : supple [No Neck Mass] : no neck mass [No JVD] : no jvd [Normal Rate/Rhythm] : normal rate/rhythm [Murmur ___ / 6] : murmur [unfilled] / 6 [Normal S1, S2] : normal s1, s2 [No Resp Distress] : no resp distress [No Acc Muscle Use] : no acc muscle use [Normal Palpation] : normal palpation [Normal Rhythm and Effort] : normal rhythm and effort [Clear to Auscultation Bilaterally] : clear to auscultation bilaterally [No Abnormalities] : no abnormalities [Normal Gait] : normal gait [No Clubbing] : no clubbing [No Cyanosis] : no cyanosis [No Edema] : no edema [FROM] : FROM [Normal Color/ Pigmentation] : normal color/ pigmentation [No Focal Deficits] : no focal deficits [Oriented x3] : oriented x3 [Normal Mood] : normal mood [Normal Affect] : normal affect [TextBox_2] : s/p eye surgery

## 2020-11-23 ENCOUNTER — APPOINTMENT (OUTPATIENT)
Dept: UROLOGY | Facility: CLINIC | Age: 75
End: 2020-11-23
Payer: MEDICARE

## 2020-11-23 PROCEDURE — 51720 TREATMENT OF BLADDER LESION: CPT

## 2020-11-23 RX ORDER — GEMCITABINE 2 G/50ML
2 INJECTION, POWDER, LYOPHILIZED, FOR SOLUTION INTRAVENOUS
Qty: 1 | Refills: 0 | Status: COMPLETED | OUTPATIENT
Start: 2020-11-23

## 2020-11-23 RX ADMIN — GEMCITABINE 0 GM: 2 INJECTION, POWDER, LYOPHILIZED, FOR SOLUTION INTRAVENOUS at 00:00

## 2020-11-30 ENCOUNTER — APPOINTMENT (OUTPATIENT)
Dept: UROLOGY | Facility: CLINIC | Age: 75
End: 2020-11-30
Payer: MEDICARE

## 2020-11-30 PROCEDURE — 99072 ADDL SUPL MATRL&STAF TM PHE: CPT

## 2020-11-30 PROCEDURE — 51720 TREATMENT OF BLADDER LESION: CPT

## 2020-11-30 RX ORDER — GEMCITABINE 2 G/50ML
2 INJECTION, POWDER, LYOPHILIZED, FOR SOLUTION INTRAVENOUS
Qty: 1 | Refills: 0 | Status: COMPLETED | OUTPATIENT
Start: 2020-11-30

## 2020-11-30 RX ADMIN — GEMCITABINE 0 GM: 2 INJECTION, POWDER, LYOPHILIZED, FOR SOLUTION INTRAVENOUS at 00:00

## 2020-12-07 ENCOUNTER — APPOINTMENT (OUTPATIENT)
Dept: UROLOGY | Facility: CLINIC | Age: 75
End: 2020-12-07
Payer: MEDICARE

## 2020-12-07 VITALS
DIASTOLIC BLOOD PRESSURE: 72 MMHG | OXYGEN SATURATION: 96 % | RESPIRATION RATE: 14 BRPM | BODY MASS INDEX: 26.66 KG/M2 | HEART RATE: 66 BPM | SYSTOLIC BLOOD PRESSURE: 112 MMHG | WEIGHT: 180 LBS | HEIGHT: 69 IN | TEMPERATURE: 98.3 F

## 2020-12-07 PROCEDURE — 51720 TREATMENT OF BLADDER LESION: CPT

## 2020-12-07 PROCEDURE — 99072 ADDL SUPL MATRL&STAF TM PHE: CPT

## 2020-12-07 RX ORDER — GEMCITABINE 2 G/50ML
2 INJECTION, POWDER, LYOPHILIZED, FOR SOLUTION INTRAVENOUS
Qty: 1 | Refills: 0 | Status: COMPLETED | OUTPATIENT
Start: 2020-12-07

## 2020-12-07 RX ADMIN — GEMCITABINE 0 GM: 2 INJECTION, POWDER, LYOPHILIZED, FOR SOLUTION INTRAVENOUS at 00:00

## 2020-12-14 ENCOUNTER — APPOINTMENT (OUTPATIENT)
Dept: UROLOGY | Facility: CLINIC | Age: 75
End: 2020-12-14
Payer: MEDICARE

## 2020-12-14 VITALS
HEIGHT: 69 IN | TEMPERATURE: 98.2 F | OXYGEN SATURATION: 95 % | RESPIRATION RATE: 14 BRPM | DIASTOLIC BLOOD PRESSURE: 72 MMHG | BODY MASS INDEX: 26.66 KG/M2 | SYSTOLIC BLOOD PRESSURE: 123 MMHG | HEART RATE: 61 BPM | WEIGHT: 180 LBS

## 2020-12-14 PROCEDURE — 99072 ADDL SUPL MATRL&STAF TM PHE: CPT

## 2020-12-14 PROCEDURE — 51720 TREATMENT OF BLADDER LESION: CPT

## 2020-12-14 RX ORDER — GEMCITABINE 2 G/50ML
2 INJECTION, POWDER, LYOPHILIZED, FOR SOLUTION INTRAVENOUS
Qty: 1 | Refills: 0 | Status: COMPLETED | OUTPATIENT
Start: 2020-12-14

## 2020-12-16 LAB — BACTERIA UR CULT: ABNORMAL

## 2020-12-18 LAB — URINE CYTOLOGY: NORMAL

## 2020-12-21 ENCOUNTER — APPOINTMENT (OUTPATIENT)
Dept: UROLOGY | Facility: CLINIC | Age: 75
End: 2020-12-21

## 2021-01-12 ENCOUNTER — OUTPATIENT (OUTPATIENT)
Dept: OUTPATIENT SERVICES | Facility: HOSPITAL | Age: 76
LOS: 1 days | End: 2021-01-12
Payer: MEDICARE

## 2021-01-12 VITALS
DIASTOLIC BLOOD PRESSURE: 88 MMHG | HEIGHT: 68 IN | TEMPERATURE: 98 F | WEIGHT: 184.09 LBS | HEART RATE: 78 BPM | OXYGEN SATURATION: 98 % | RESPIRATION RATE: 14 BRPM | SYSTOLIC BLOOD PRESSURE: 154 MMHG

## 2021-01-12 DIAGNOSIS — Z98.89 OTHER SPECIFIED POSTPROCEDURAL STATES: Chronic | ICD-10-CM

## 2021-01-12 DIAGNOSIS — C67.9 MALIGNANT NEOPLASM OF BLADDER, UNSPECIFIED: ICD-10-CM

## 2021-01-12 DIAGNOSIS — Z01.818 ENCOUNTER FOR OTHER PREPROCEDURAL EXAMINATION: ICD-10-CM

## 2021-01-12 DIAGNOSIS — Z98.890 OTHER SPECIFIED POSTPROCEDURAL STATES: Chronic | ICD-10-CM

## 2021-01-12 LAB
ALBUMIN SERPL ELPH-MCNC: 3.9 G/DL — SIGNIFICANT CHANGE UP (ref 3.3–5)
ALP SERPL-CCNC: 69 U/L — SIGNIFICANT CHANGE UP (ref 40–120)
ALT FLD-CCNC: 20 U/L — SIGNIFICANT CHANGE UP (ref 10–45)
ANION GAP SERPL CALC-SCNC: 9 MMOL/L — SIGNIFICANT CHANGE UP (ref 5–17)
AST SERPL-CCNC: 18 U/L — SIGNIFICANT CHANGE UP (ref 10–40)
BILIRUB SERPL-MCNC: 0.4 MG/DL — SIGNIFICANT CHANGE UP (ref 0.2–1.2)
BUN SERPL-MCNC: 22 MG/DL — SIGNIFICANT CHANGE UP (ref 7–23)
CALCIUM SERPL-MCNC: 9.2 MG/DL — SIGNIFICANT CHANGE UP (ref 8.4–10.5)
CHLORIDE SERPL-SCNC: 105 MMOL/L — SIGNIFICANT CHANGE UP (ref 96–108)
CO2 SERPL-SCNC: 27 MMOL/L — SIGNIFICANT CHANGE UP (ref 22–31)
CREAT SERPL-MCNC: 1.08 MG/DL — SIGNIFICANT CHANGE UP (ref 0.5–1.3)
GLUCOSE SERPL-MCNC: 101 MG/DL — HIGH (ref 70–99)
HCT VFR BLD CALC: 42.9 % — SIGNIFICANT CHANGE UP (ref 39–50)
HGB BLD-MCNC: 14.6 G/DL — SIGNIFICANT CHANGE UP (ref 13–17)
MCHC RBC-ENTMCNC: 34 GM/DL — SIGNIFICANT CHANGE UP (ref 32–36)
MCHC RBC-ENTMCNC: 36 PG — HIGH (ref 27–34)
MCV RBC AUTO: 105.9 FL — HIGH (ref 80–100)
NRBC # BLD: 0 /100 WBCS — SIGNIFICANT CHANGE UP (ref 0–0)
PLATELET # BLD AUTO: 207 K/UL — SIGNIFICANT CHANGE UP (ref 150–400)
POTASSIUM SERPL-MCNC: 4.9 MMOL/L — SIGNIFICANT CHANGE UP (ref 3.5–5.3)
POTASSIUM SERPL-SCNC: 4.9 MMOL/L — SIGNIFICANT CHANGE UP (ref 3.5–5.3)
PROT SERPL-MCNC: 7.1 G/DL — SIGNIFICANT CHANGE UP (ref 6–8.3)
RBC # BLD: 4.05 M/UL — LOW (ref 4.2–5.8)
RBC # FLD: 13 % — SIGNIFICANT CHANGE UP (ref 10.3–14.5)
SODIUM SERPL-SCNC: 141 MMOL/L — SIGNIFICANT CHANGE UP (ref 135–145)
WBC # BLD: 8.83 K/UL — SIGNIFICANT CHANGE UP (ref 3.8–10.5)
WBC # FLD AUTO: 8.83 K/UL — SIGNIFICANT CHANGE UP (ref 3.8–10.5)

## 2021-01-12 PROCEDURE — 87086 URINE CULTURE/COLONY COUNT: CPT

## 2021-01-12 PROCEDURE — 85027 COMPLETE CBC AUTOMATED: CPT

## 2021-01-12 PROCEDURE — 80053 COMPREHEN METABOLIC PANEL: CPT

## 2021-01-12 PROCEDURE — G0463: CPT

## 2021-01-12 RX ORDER — CEFAZOLIN SODIUM 1 G
2000 VIAL (EA) INJECTION ONCE
Refills: 0 | Status: DISCONTINUED | OUTPATIENT
Start: 2021-01-26 | End: 2021-02-09

## 2021-01-12 RX ORDER — SODIUM CHLORIDE 9 MG/ML
3 INJECTION INTRAMUSCULAR; INTRAVENOUS; SUBCUTANEOUS EVERY 8 HOURS
Refills: 0 | Status: DISCONTINUED | OUTPATIENT
Start: 2021-01-26 | End: 2021-02-09

## 2021-01-12 RX ORDER — LIDOCAINE HCL 20 MG/ML
0.2 VIAL (ML) INJECTION ONCE
Refills: 0 | Status: DISCONTINUED | OUTPATIENT
Start: 2021-01-26 | End: 2021-02-09

## 2021-01-12 RX ORDER — ACETAMINOPHEN 500 MG
0 TABLET ORAL
Qty: 0 | Refills: 0 | DISCHARGE

## 2021-01-12 NOTE — H&P PST ADULT - HISTORY OF PRESENT ILLNESS
Mr. Miranda is a 75 year old man with PMH current Smoker, Hepatitis C with successful treatment, diverticulosis, colon cancer s/p polypectomy chemo (approximately 2.5 months ago) or RT in 2012, BPH and bladder cancer s/p TURBT 2016 scheduled for yearly/ s/p cystoscopy with bladder biopsy on 7/7/2020 and 9/2020. Pt noted with h/o hematuria followed by urology, not currently.  PST for scheduled Cystoscopy Bipolar Bladder Tumor on 1/26/21.  He denies fever, chills, malaise, fatigue, sore throat, cough, chest congestion, nasal congestion, SOB, abdominal pain, diarrhea, n/v, loss of taste and smell.     COVID pending 1/23/21  Covid test negative-last week

## 2021-01-12 NOTE — H&P PST ADULT - RS GEN PE MLT RESP DETAILS PC
breath sounds equal/good air movement/respirations non-labored airway patent/breath sounds equal/good air movement/respirations non-labored/clear to auscultation bilaterally

## 2021-01-12 NOTE — H&P PST ADULT - OPHTHALMOLOGIC COMMENTS
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

## 2021-01-12 NOTE — H&P PST ADULT - NSICDXPASTMEDICALHX_GEN_ALL_CORE_FT
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

## 2021-01-12 NOTE — H&P PST ADULT - HEALTH CARE MAINTENANCE
Flu vaccine annually  Pneumonia vaccine up to date  No shingles vaccine Flu vaccine annually  Pneumonia vaccine up to date  No shingles vaccine  No COVID vaccine

## 2021-01-12 NOTE — H&P PST ADULT - NSICDXPROBLEM_GEN_ALL_CORE_FT
PROBLEM DIAGNOSES  Problem: Bladder tumor  Assessment and Plan: Cystoscopy. Transuretheral Resection of Bladder on 10/6/20  Pre- Op Instructions discussed   Labs sent  Covid test on 10/2 20        PROBLEM DIAGNOSES  Problem: Malignant neoplasm of bladder, unspecified  Assessment and Plan: -Scheduled for cystoscopy-bipolar bladder tumor resection on 1/26/21 with Dr. Caicedo  -CBC, CMP, Urine culture today  -COVID scheduled for 1/23/21  -perop instructions provided and patient stated understanding  -may take metoprolol am of surgery with sip of water

## 2021-01-14 ENCOUNTER — APPOINTMENT (OUTPATIENT)
Dept: GASTROENTEROLOGY | Facility: CLINIC | Age: 76
End: 2021-01-14
Payer: MEDICARE

## 2021-01-14 ENCOUNTER — APPOINTMENT (OUTPATIENT)
Dept: CARDIOLOGY | Facility: CLINIC | Age: 76
End: 2021-01-14
Payer: MEDICARE

## 2021-01-14 VITALS
DIASTOLIC BLOOD PRESSURE: 72 MMHG | HEART RATE: 95 BPM | BODY MASS INDEX: 27.25 KG/M2 | OXYGEN SATURATION: 95 % | WEIGHT: 184 LBS | TEMPERATURE: 98 F | SYSTOLIC BLOOD PRESSURE: 116 MMHG | HEIGHT: 69 IN

## 2021-01-14 VITALS
OXYGEN SATURATION: 98 % | SYSTOLIC BLOOD PRESSURE: 100 MMHG | WEIGHT: 184 LBS | HEART RATE: 89 BPM | HEIGHT: 69 IN | TEMPERATURE: 97.8 F | BODY MASS INDEX: 27.25 KG/M2 | DIASTOLIC BLOOD PRESSURE: 60 MMHG

## 2021-01-14 LAB
CULTURE RESULTS: SIGNIFICANT CHANGE UP
SPECIMEN SOURCE: SIGNIFICANT CHANGE UP

## 2021-01-14 PROCEDURE — 99213 OFFICE O/P EST LOW 20 MIN: CPT | Mod: 25

## 2021-01-14 PROCEDURE — 93000 ELECTROCARDIOGRAM COMPLETE: CPT

## 2021-01-14 PROCEDURE — 99072 ADDL SUPL MATRL&STAF TM PHE: CPT

## 2021-01-14 PROCEDURE — 99213 OFFICE O/P EST LOW 20 MIN: CPT

## 2021-01-14 PROCEDURE — 36415 COLL VENOUS BLD VENIPUNCTURE: CPT

## 2021-01-17 NOTE — ASSESSMENT
[FreeTextEntry1] : Patient with a history of hepatitis C which was treated with a sustained viral response and a history of adenomatous colonic polyps.\par \par Blood work was sent for HCVRNA, HCV FibroSure testing, LFTs, PT, alpha-fetoprotein.\par \par Patient will return to see me in July.  He is due for colonoscopy in November of this year.\par \par \par Plan from 1/7/2019 - Patient status post colonoscopy with diverticulosis. He has a history of colonic polyps. He has a history of hepatitis C with negative labs in August.\par \par Information was given to the patient regarding diverticulosis and a high-fiber diet.\par \par Patient will return to see me over the summer at which time we will check labs which should be followed every 6-12 months.\par \par We will repeat a colonoscopy in 3 years that is November 2021 given the patient's history of polyps.\par \par \par Plan from 8/31/18 - Patient with a history of colonic polyps and a history of hepatitis C with sustained response.\par \par A colonoscopy has been scheduled. The risks, benefits, alternatives, and limitations of the procedure, including the possibility of missed lesions, were explained.\par \par Blood work was sent for HCVRNA, LFTs, AFP.\par \par \par Plan from 8/15/17 - Patient with a history of colonic polyps and history of hepatitis C with sustained viral response to treatment.\par \par Blood work was sent for HCV RNA, LFTs, AFP.\par \par Patient is not yet do for his colonoscopy. He will return to see me in one year at which time we will pursue colonoscopy.

## 2021-01-17 NOTE — PHYSICAL EXAM
[Normal Appearance] : normal appearance [General Appearance - Well Developed] : well developed [Well Groomed] : well groomed [General Appearance - Well Nourished] : well nourished [No Deformities] : no deformities [General Appearance - In No Acute Distress] : no acute distress [Normal Conjunctiva] : the conjunctiva exhibited no abnormalities [Eyelids - No Xanthelasma] : the eyelids demonstrated no xanthelasmas [Normal Oral Mucosa] : normal oral mucosa [No Oral Pallor] : no oral pallor [No Oral Cyanosis] : no oral cyanosis [Normal Jugular Venous A Waves Present] : normal jugular venous A waves present [Normal Jugular Venous V Waves Present] : normal jugular venous V waves present [No Jugular Venous Griffith A Waves] : no jugular venous griffith A waves [Respiration, Rhythm And Depth] : normal respiratory rhythm and effort [Exaggerated Use Of Accessory Muscles For Inspiration] : no accessory muscle use [Auscultation Breath Sounds / Voice Sounds] : lungs were clear to auscultation bilaterally [Abdomen Soft] : soft [Abdomen Tenderness] : non-tender [Abdomen Mass (___ Cm)] : no abdominal mass palpated [Abnormal Walk] : normal gait [Gait - Sufficient For Exercise Testing] : the gait was sufficient for exercise testing [Nail Clubbing] : no clubbing of the fingernails [Cyanosis, Localized] : no localized cyanosis [Petechial Hemorrhages (___cm)] : no petechial hemorrhages [Skin Color & Pigmentation] : normal skin color and pigmentation [] : no rash [No Venous Stasis] : no venous stasis [Skin Lesions] : no skin lesions [No Skin Ulcers] : no skin ulcer [No Xanthoma] : no  xanthoma was observed [Oriented To Time, Place, And Person] : oriented to person, place, and time [Affect] : the affect was normal [Mood] : the mood was normal [No Anxiety] : not feeling anxious [FreeTextEntry1] : Regular rate and rhythm, NL S1, S2, non-displaced PMI, chest non-tender; no rubs,heaves  or gallops a  Grade 2/6 systolic murmur noted at the LSB

## 2021-01-17 NOTE — CONSULT LETTER
[FreeTextEntry1] : Dear Dr. Shana Nix,\[facility] \[facility] I had the pleasure of seeing your patient TAJ HUBBARD in the office today.  My office note is attached. PLEASE READ THE "ASSESSMENT" SECTION OF THE NOTE TO SEE MY IMPRESSION AND PLAN.\par \[facility] Thank you very much for allowing me to participate in the care of your patient.\[facility] \[facility] Sincerely,\[facility] \[facility] Yeison Alex M.D., FAC, [facility] Director, Celiac Program at [facility]  of Medicine\[facility] [employer] at [facility] Practice Director,\[facility] - Gastroenterology/Internal Medicine at [address]\[place], [place] [address] Tel: [phone]\[facility] Email: [email or web address] \[facility] \[facility] The attached note has been created using a voice recognition system (Dragon).  There may be some misspellings and typos.  Please call my office if you have any issues or questions.

## 2021-01-17 NOTE — HISTORY OF PRESENT ILLNESS
[FreeTextEntry1] : The patient has a history of hepatitis C with a sustained viral response.  He also has a history of adenomatous colonic polyps and has bladder cancer which was treated with chemotherapy infused directly into the bladder.  He has a thoracic aortic aneurysm measuring 4.5 cm which is followed by cardiology.  The patient feels well denying abdominal pain, heartburn, dysphagia.  He has 2-3 solid bowel movements a day without melena or bright red blood per rectum.  The patient's weight is stable.  He has not been hospitalized in the past year.\par \par \par Note from 1/7/2019 - We reviewed the evaluations done since the patient's last visit on August 31, 2018. Blood work from that day was normal with no evidence of hepatitis C. The patient is status post colonoscopy performed on October 31, 2018. A polyp was removed with negative biopsies. The patient also has diverticulosis and internal hemorrhoids which are asymptomatic. The patient denies abdominal pain. Bowel movements are normal without melena or bright blood per rectum.\par \par \par Note from 8/31/18 - The patient has a history of colonic polyps and a history of hep C with a sustained viral response. His blood work from his last visit in August 2017 revealed an undetectable HCVRNA. The patient has had for eye surgeries in the past 2 months and 8 in the past 2 years but is otherwise well. He denies abdominal pain. He is to solid bowel movements a day without melena or bright red blood per rectum. He denies heartburn or dysphagia. He has lost 8 pounds in the past year. The patient has not been hospitalized in the past year and denies any cardiac issues.\par \par \par Note from 8/15/17 - The patient has a history of colonic polyps, a history of bladder cancer, and a history of hepatitis C which was treated in 2009 with Pegasys and ribavirin. Despite only taking 14 weeks of treatment due to a drug eruption, he is achieved a sustained viral response. The patient feels well denying abdominal pain, diarrhea, constipation, melena, bright blood per rectum, heartburn, or dysphagia. His weight is stable. The patient has not been hospitalized in the past year and denies any cardiac issues. The patient takes Percocet essentially daily for back and leg pain.

## 2021-01-17 NOTE — HISTORY OF PRESENT ILLNESS
[FreeTextEntry1] : The patient here for evaluation of high blood pressure. Patient is currently tolerating the current antihypertensive regime and they deny headaches, stiff neck, visual changes, or PND. The patient has been trying to stay on a low-sodium diet. pt is here for follow up of dilated aorta they have been taking medication as prescribed  and  blood pressure have been observed in the a reasonable range, their are no reports of worrisome symptoms, such as tearing or "knife like' back pain, chest pain or syncope.\par

## 2021-01-20 ENCOUNTER — NON-APPOINTMENT (OUTPATIENT)
Age: 76
End: 2021-01-20

## 2021-01-21 LAB
AFP-TM SERPL-MCNC: <1.8 NG/ML
ALBUMIN SERPL ELPH-MCNC: 4.2 G/DL
ALP BLD-CCNC: 65 U/L
ALT SERPL-CCNC: 22 U/L
AST SERPL-CCNC: 18 U/L
BILIRUB DIRECT SERPL-MCNC: 0.2 MG/DL
BILIRUB INDIRECT SERPL-MCNC: 0.4 MG/DL
BILIRUB SERPL-MCNC: 0.6 MG/DL
FIBROSIS SCORE: 0.59
FIBROSIS STAGE: NORMAL
FIBROSURE ALPHA 2 MACROGLOBULINS: 317 MG/DL
FIBROSURE ALT (SGPT): 24 IU/L
FIBROSURE APOLIPOPROTEIN A1: 142 MG/DL
FIBROSURE COMMENT 2: NORMAL
FIBROSURE GGT: 47 IU/L
FIBROSURE HAPTOGLOBIN: 147 MG/DL
FIBROSURE INTERPRETATIONS: NORMAL
FIBROSURE LIMITATIONS: NORMAL
FIBROSURE NECROINFLAMM ACTIVITY SCORING: NORMAL
FIBROSURE NECROINFLAMMAT ACTIVITY GRPFIBROSURE NECROINFLAMMA: NORMAL
FIBROSURE NECROINFLAMMAT ACTIVITY SCORE: 0.16
FIBROSURE SCORING: NORMAL
FIBROSURE TOTAL BILIRUBIN: 0.4 MG/DL
GGT SERPL-CCNC: 50 U/L
HCV GENTYP BLD NAA+PROBE: NOT DETECTED
HCV RNA SERPL NAA DL=5-ACNC: NOT DETECTED
HCV RNA SERPL NAA+PROBE-LOG IU: NOT DETECTED LOG10IU/ML
INR PPP: 1.04 RATIO
PROT SERPL-MCNC: 6.3 G/DL
PT BLD: 12.3 SEC

## 2021-01-22 ENCOUNTER — NON-APPOINTMENT (OUTPATIENT)
Age: 76
End: 2021-01-22

## 2021-01-23 ENCOUNTER — OUTPATIENT (OUTPATIENT)
Dept: OUTPATIENT SERVICES | Facility: HOSPITAL | Age: 76
LOS: 1 days | End: 2021-01-23
Payer: MEDICARE

## 2021-01-23 DIAGNOSIS — Z98.89 OTHER SPECIFIED POSTPROCEDURAL STATES: Chronic | ICD-10-CM

## 2021-01-23 DIAGNOSIS — Z11.52 ENCOUNTER FOR SCREENING FOR COVID-19: ICD-10-CM

## 2021-01-23 DIAGNOSIS — Z98.890 OTHER SPECIFIED POSTPROCEDURAL STATES: Chronic | ICD-10-CM

## 2021-01-23 DIAGNOSIS — Z20.828 CONTACT WITH AND (SUSPECTED) EXPOSURE TO OTHER VIRAL COMMUNICABLE DISEASES: ICD-10-CM

## 2021-01-23 LAB — SARS-COV-2 RNA SPEC QL NAA+PROBE: SIGNIFICANT CHANGE UP

## 2021-01-23 PROCEDURE — U0005: CPT

## 2021-01-23 PROCEDURE — C9803: CPT

## 2021-01-23 PROCEDURE — U0003: CPT

## 2021-01-25 ENCOUNTER — TRANSCRIPTION ENCOUNTER (OUTPATIENT)
Age: 76
End: 2021-01-25

## 2021-01-26 ENCOUNTER — APPOINTMENT (OUTPATIENT)
Dept: UROLOGY | Facility: HOSPITAL | Age: 76
End: 2021-01-26

## 2021-01-26 ENCOUNTER — OUTPATIENT (OUTPATIENT)
Dept: OUTPATIENT SERVICES | Facility: HOSPITAL | Age: 76
LOS: 1 days | Discharge: HOME | End: 2021-01-26
Payer: MEDICARE

## 2021-01-26 VITALS
OXYGEN SATURATION: 98 % | HEART RATE: 79 BPM | RESPIRATION RATE: 16 BRPM | DIASTOLIC BLOOD PRESSURE: 80 MMHG | TEMPERATURE: 97 F | HEIGHT: 68 IN | WEIGHT: 184.09 LBS | SYSTOLIC BLOOD PRESSURE: 122 MMHG

## 2021-01-26 VITALS
DIASTOLIC BLOOD PRESSURE: 66 MMHG | SYSTOLIC BLOOD PRESSURE: 123 MMHG | HEART RATE: 80 BPM | OXYGEN SATURATION: 98 % | RESPIRATION RATE: 15 BRPM

## 2021-01-26 DIAGNOSIS — Z85.51 PERSONAL HISTORY OF MALIGNANT NEOPLASM OF BLADDER: ICD-10-CM

## 2021-01-26 DIAGNOSIS — Z98.89 OTHER SPECIFIED POSTPROCEDURAL STATES: Chronic | ICD-10-CM

## 2021-01-26 DIAGNOSIS — Z98.890 OTHER SPECIFIED POSTPROCEDURAL STATES: Chronic | ICD-10-CM

## 2021-01-26 DIAGNOSIS — C67.9 MALIGNANT NEOPLASM OF BLADDER, UNSPECIFIED: ICD-10-CM

## 2021-01-26 PROCEDURE — 52234 CYSTOSCOPY AND TREATMENT: CPT

## 2021-01-26 RX ORDER — METOPROLOL TARTRATE 50 MG
0 TABLET ORAL
Qty: 0 | Refills: 0 | DISCHARGE

## 2021-01-26 RX ORDER — OXYCODONE HYDROCHLORIDE 5 MG/1
1 TABLET ORAL
Qty: 0 | Refills: 0 | DISCHARGE

## 2021-01-26 RX ORDER — GEMCITABINE 2 G/50ML
2 INJECTION, POWDER, LYOPHILIZED, FOR SOLUTION INTRAVENOUS
Qty: 1 | Refills: 5 | Status: DISCONTINUED | COMMUNITY
Start: 2020-10-08 | End: 2021-01-26

## 2021-01-26 RX ORDER — OXYCODONE HYDROCHLORIDE 5 MG/1
5 TABLET ORAL ONCE
Refills: 0 | Status: DISCONTINUED | OUTPATIENT
Start: 2021-01-26 | End: 2021-01-26

## 2021-01-26 RX ORDER — SODIUM CHLORIDE 9 MG/ML
1000 INJECTION, SOLUTION INTRAVENOUS
Refills: 0 | Status: DISCONTINUED | OUTPATIENT
Start: 2021-01-26 | End: 2021-02-09

## 2021-01-26 RX ORDER — ACETAMINOPHEN 500 MG
0 TABLET ORAL
Qty: 0 | Refills: 0 | DISCHARGE

## 2021-01-26 RX ORDER — CEPHALEXIN 250 MG/1
250 CAPSULE ORAL
Qty: 10 | Refills: 0 | Status: DISCONTINUED | COMMUNITY
Start: 2021-01-20 | End: 2021-01-26

## 2021-01-26 RX ORDER — ONDANSETRON 8 MG/1
4 TABLET, FILM COATED ORAL ONCE
Refills: 0 | Status: DISCONTINUED | OUTPATIENT
Start: 2021-01-26 | End: 2021-02-09

## 2021-01-26 NOTE — PRE-ANESTHESIA EVALUATION ADULT - NSANTHPMHFT_GEN_ALL_CORE
cysto multiple   dilated ascending aorta followed by CT   walks 2 blocks no CP//SOB limited by sciatica rx STACEY   denies GERD  pt states was told to not take any tylenol by his hepatologist

## 2021-01-26 NOTE — ASU PATIENT PROFILE, ADULT - PMH
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

## 2021-01-26 NOTE — ASU DISCHARGE PLAN (ADULT/PEDIATRIC) - CARE PROVIDER_API CALL
Todd Caicedo)  Urology  [address], [place] [address]  Phone: [phone]  Fax: [phone]  Follow Up Time:

## 2021-02-03 ENCOUNTER — APPOINTMENT (OUTPATIENT)
Dept: PULMONOLOGY | Facility: CLINIC | Age: 76
End: 2021-02-03

## 2021-02-05 ENCOUNTER — APPOINTMENT (OUTPATIENT)
Dept: HEPATOLOGY | Facility: CLINIC | Age: 76
End: 2021-02-05
Payer: MEDICARE

## 2021-02-05 PROCEDURE — 99072 ADDL SUPL MATRL&STAF TM PHE: CPT

## 2021-02-05 PROCEDURE — 91200 LIVER ELASTOGRAPHY: CPT

## 2021-02-26 ENCOUNTER — INPATIENT (INPATIENT)
Facility: HOSPITAL | Age: 76
LOS: 3 days | Discharge: HOME | End: 2021-03-02
Attending: HOSPITALIST | Admitting: HOSPITALIST
Payer: MEDICARE

## 2021-02-26 VITALS
HEIGHT: 68 IN | TEMPERATURE: 98 F | HEART RATE: 80 BPM | OXYGEN SATURATION: 100 % | SYSTOLIC BLOOD PRESSURE: 133 MMHG | DIASTOLIC BLOOD PRESSURE: 80 MMHG | RESPIRATION RATE: 16 BRPM

## 2021-02-26 DIAGNOSIS — R55 SYNCOPE AND COLLAPSE: ICD-10-CM

## 2021-02-26 DIAGNOSIS — I48.91 UNSPECIFIED ATRIAL FIBRILLATION: ICD-10-CM

## 2021-02-26 DIAGNOSIS — Z29.9 ENCOUNTER FOR PROPHYLACTIC MEASURES, UNSPECIFIED: ICD-10-CM

## 2021-02-26 DIAGNOSIS — Z02.9 ENCOUNTER FOR ADMINISTRATIVE EXAMINATIONS, UNSPECIFIED: ICD-10-CM

## 2021-02-26 DIAGNOSIS — R04.0 EPISTAXIS: ICD-10-CM

## 2021-02-26 DIAGNOSIS — Z98.89 OTHER SPECIFIED POSTPROCEDURAL STATES: Chronic | ICD-10-CM

## 2021-02-26 DIAGNOSIS — I71.9 AORTIC ANEURYSM OF UNSPECIFIED SITE, WITHOUT RUPTURE: ICD-10-CM

## 2021-02-26 DIAGNOSIS — M51.9 UNSPECIFIED THORACIC, THORACOLUMBAR AND LUMBOSACRAL INTERVERTEBRAL DISC DISORDER: ICD-10-CM

## 2021-02-26 DIAGNOSIS — Z98.890 OTHER SPECIFIED POSTPROCEDURAL STATES: Chronic | ICD-10-CM

## 2021-02-26 LAB
ALBUMIN SERPL ELPH-MCNC: 3.8 G/DL — SIGNIFICANT CHANGE UP (ref 3.3–5)
ALP SERPL-CCNC: 62 U/L — SIGNIFICANT CHANGE UP (ref 40–120)
ALT FLD-CCNC: 22 U/L — SIGNIFICANT CHANGE UP (ref 4–41)
ANION GAP SERPL CALC-SCNC: 12 MMOL/L — SIGNIFICANT CHANGE UP (ref 7–14)
ANION GAP SERPL CALC-SCNC: 8 MMOL/L — SIGNIFICANT CHANGE UP (ref 7–14)
APTT BLD: 29.3 SEC — SIGNIFICANT CHANGE UP (ref 27–36.3)
AST SERPL-CCNC: 40 U/L — SIGNIFICANT CHANGE UP (ref 4–40)
BASOPHILS # BLD AUTO: 0.05 K/UL — SIGNIFICANT CHANGE UP (ref 0–0.2)
BASOPHILS NFR BLD AUTO: 0.7 % — SIGNIFICANT CHANGE UP (ref 0–2)
BILIRUB SERPL-MCNC: 0.4 MG/DL — SIGNIFICANT CHANGE UP (ref 0.2–1.2)
BLD GP AB SCN SERPL QL: NEGATIVE — SIGNIFICANT CHANGE UP
BUN SERPL-MCNC: 14 MG/DL — SIGNIFICANT CHANGE UP (ref 7–23)
BUN SERPL-MCNC: 16 MG/DL — SIGNIFICANT CHANGE UP (ref 7–23)
CALCIUM SERPL-MCNC: 7.6 MG/DL — LOW (ref 8.4–10.5)
CALCIUM SERPL-MCNC: 9 MG/DL — SIGNIFICANT CHANGE UP (ref 8.4–10.5)
CHLORIDE SERPL-SCNC: 105 MMOL/L — SIGNIFICANT CHANGE UP (ref 98–107)
CHLORIDE SERPL-SCNC: 97 MMOL/L — LOW (ref 98–107)
CO2 SERPL-SCNC: 18 MMOL/L — LOW (ref 22–31)
CO2 SERPL-SCNC: 23 MMOL/L — SIGNIFICANT CHANGE UP (ref 22–31)
CREAT SERPL-MCNC: 0.65 MG/DL — SIGNIFICANT CHANGE UP (ref 0.5–1.3)
CREAT SERPL-MCNC: 0.72 MG/DL — SIGNIFICANT CHANGE UP (ref 0.5–1.3)
EOSINOPHIL # BLD AUTO: 0.19 K/UL — SIGNIFICANT CHANGE UP (ref 0–0.5)
EOSINOPHIL NFR BLD AUTO: 2.5 % — SIGNIFICANT CHANGE UP (ref 0–6)
GLUCOSE SERPL-MCNC: 85 MG/DL — SIGNIFICANT CHANGE UP (ref 70–99)
GLUCOSE SERPL-MCNC: 90 MG/DL — SIGNIFICANT CHANGE UP (ref 70–99)
HCT VFR BLD CALC: 45.9 % — SIGNIFICANT CHANGE UP (ref 39–50)
HGB BLD-MCNC: 15.1 G/DL — SIGNIFICANT CHANGE UP (ref 13–17)
IANC: 4.12 K/UL — SIGNIFICANT CHANGE UP (ref 1.5–8.5)
IMM GRANULOCYTES NFR BLD AUTO: 0.1 % — SIGNIFICANT CHANGE UP (ref 0–1.5)
INR BLD: 1.01 RATIO — SIGNIFICANT CHANGE UP (ref 0.88–1.16)
LYMPHOCYTES # BLD AUTO: 2.51 K/UL — SIGNIFICANT CHANGE UP (ref 1–3.3)
LYMPHOCYTES # BLD AUTO: 33.6 % — SIGNIFICANT CHANGE UP (ref 13–44)
MCHC RBC-ENTMCNC: 32.9 GM/DL — SIGNIFICANT CHANGE UP (ref 32–36)
MCHC RBC-ENTMCNC: 34.7 PG — HIGH (ref 27–34)
MCV RBC AUTO: 105.5 FL — HIGH (ref 80–100)
MONOCYTES # BLD AUTO: 0.6 K/UL — SIGNIFICANT CHANGE UP (ref 0–0.9)
MONOCYTES NFR BLD AUTO: 8 % — SIGNIFICANT CHANGE UP (ref 2–14)
NEUTROPHILS # BLD AUTO: 4.12 K/UL — SIGNIFICANT CHANGE UP (ref 1.8–7.4)
NEUTROPHILS NFR BLD AUTO: 55.1 % — SIGNIFICANT CHANGE UP (ref 43–77)
NRBC # BLD: 0 /100 WBCS — SIGNIFICANT CHANGE UP
NRBC # FLD: 0 K/UL — SIGNIFICANT CHANGE UP
PLATELET # BLD AUTO: 181 K/UL — SIGNIFICANT CHANGE UP (ref 150–400)
POTASSIUM SERPL-MCNC: 5.5 MMOL/L — HIGH (ref 3.5–5.3)
POTASSIUM SERPL-MCNC: SIGNIFICANT CHANGE UP MMOL/L (ref 3.5–5.3)
POTASSIUM SERPL-SCNC: 5.5 MMOL/L — HIGH (ref 3.5–5.3)
POTASSIUM SERPL-SCNC: SIGNIFICANT CHANGE UP MMOL/L (ref 3.5–5.3)
PROT SERPL-MCNC: 7 G/DL — SIGNIFICANT CHANGE UP (ref 6–8.3)
PROTHROM AB SERPL-ACNC: 11.6 SEC — SIGNIFICANT CHANGE UP (ref 10.6–13.6)
RBC # BLD: 4.35 M/UL — SIGNIFICANT CHANGE UP (ref 4.2–5.8)
RBC # FLD: 11.9 % — SIGNIFICANT CHANGE UP (ref 10.3–14.5)
RH IG SCN BLD-IMP: POSITIVE — SIGNIFICANT CHANGE UP
SARS-COV-2 RNA SPEC QL NAA+PROBE: SIGNIFICANT CHANGE UP
SODIUM SERPL-SCNC: 123 MMOL/L — LOW (ref 135–145)
SODIUM SERPL-SCNC: 140 MMOL/L — SIGNIFICANT CHANGE UP (ref 135–145)
TROPONIN T, HIGH SENSITIVITY RESULT: <6 NG/L — SIGNIFICANT CHANGE UP
TSH SERPL-MCNC: 0.99 UIU/ML — SIGNIFICANT CHANGE UP (ref 0.27–4.2)
WBC # BLD: 7.48 K/UL — SIGNIFICANT CHANGE UP (ref 3.8–10.5)
WBC # FLD AUTO: 7.48 K/UL — SIGNIFICANT CHANGE UP (ref 3.8–10.5)

## 2021-02-26 PROCEDURE — 93010 ELECTROCARDIOGRAM REPORT: CPT

## 2021-02-26 PROCEDURE — 99285 EMERGENCY DEPT VISIT HI MDM: CPT | Mod: 25

## 2021-02-26 PROCEDURE — 99223 1ST HOSP IP/OBS HIGH 75: CPT | Mod: GC

## 2021-02-26 PROCEDURE — 74174 CTA ABD&PLVS W/CONTRAST: CPT | Mod: 26

## 2021-02-26 PROCEDURE — 71046 X-RAY EXAM CHEST 2 VIEWS: CPT | Mod: 26

## 2021-02-26 RX ORDER — OXYCODONE HYDROCHLORIDE 5 MG/1
0 TABLET ORAL
Qty: 0 | Refills: 0 | DISCHARGE

## 2021-02-26 RX ORDER — OXYCODONE AND ACETAMINOPHEN 5; 325 MG/1; MG/1
2 TABLET ORAL EVERY 6 HOURS
Refills: 0 | Status: DISCONTINUED | OUTPATIENT
Start: 2021-02-26 | End: 2021-03-02

## 2021-02-26 RX ORDER — TOBRAMYCIN AND DEXAMETHASONE 1; 3 MG/ML; MG/ML
0 SUSPENSION/ DROPS OPHTHALMIC
Qty: 0 | Refills: 3 | DISCHARGE

## 2021-02-26 RX ORDER — SODIUM CHLORIDE 0.65 %
1 AEROSOL, SPRAY (ML) NASAL
Refills: 0 | Status: DISCONTINUED | OUTPATIENT
Start: 2021-02-26 | End: 2021-03-02

## 2021-02-26 RX ORDER — METOPROLOL TARTRATE 50 MG
50 TABLET ORAL DAILY
Refills: 0 | Status: DISCONTINUED | OUTPATIENT
Start: 2021-02-26 | End: 2021-03-02

## 2021-02-26 NOTE — H&P ADULT - NSHPSOCIALHISTORY_GEN_ALL_CORE
Tobacco Usage:  ( ) never smoked   ( ) former smoker  (x ) current smoker; Packs per day: 2 cigars per day  Alcohol Usage: (x ) none  ( ) occasional ( ) 2-3 times a week ( ) daily; Last drink:   Recreational drugs (x ) None  Lives with Family.  Denies Recent travel

## 2021-02-26 NOTE — ED PROVIDER NOTE - IV ALTEPLASE EXCL ABS HIDDEN
Patient here for a 1 year follow up     EKG done today    Patient denies cp, sob, dizziness and palpitations     Patient complains of swelling in ankles    Patient no longer takes repatha show

## 2021-02-26 NOTE — H&P ADULT - NSHPLABSRESULTS_GEN_ALL_CORE
Labs:                        15.1   7.48  )-----------( 181      ( 26 Feb 2021 18:26 )             45.9     02-26    123<L>  |  97<L>  |  14  ----------------------------<  85  TNP   |  18<L>  |  0.65    Ca    7.6<L>      26 Feb 2021 20:08    TPro  7.0  /  Alb  3.8  /  TBili  0.4  /  DBili  x   /  AST  40  /  ALT  22  /  AlkPhos  62  02-26    PT/INR: PT: 11.6 sec | INR: 1.01 ratio (02-26-21 @ 18:26)  PTT: 29.3 sec (02-26-21 @ 18:26)    CARDIAC ENZYMES:  Troponin T, High Sensitivity: <6 ng/L (02-26-21 @ 18:26)    CAPILLARY BLOOD GLUCOSE:  POCT Blood Glucose: 85 mg/dL (02-26-21 @ 15:00)    COVID-19/Full RVP Panel:  NotDetec (01-23-21 @ 11:06)    3/2019 EKG:  NSR@ 65 qtc 409   2/2021 EKG:  AFIB@79     CTA Abdomen/ Pelvis: IMPRESSION:  No acute intra-abdominal pathology. No evidence of abdominal aortic aneurysm.  Renal artery aneurysms are stable.    Chest Xray: Prelim: INTERPRETATION:  clear lungs

## 2021-02-26 NOTE — H&P ADULT - NSHPPHYSICALEXAM_GEN_ALL_CORE
T(C): 36.6 (02-26-21 @ 18:16), Max: 36.7 (02-26-21 @ 14:50)  HR: 82 (02-26-21 @ 18:16) (80 - 82)  BP: 128/78 (02-26-21 @ 18:16) (128/78 - 133/80)  RR: 17 (02-26-21 @ 18:16) (16 - 17)  SpO2: 100% (02-26-21 @ 18:16) (100% - 100%)    Constitutional: NAD, well-developed, well-nourished  Ears, Nose, Mouth, and Throat: normal external ears and nose, normal hearing, moist oral mucosa  Eyes: normal conjunctiva, EOMI, PERRL  Neck: supple, no JVD  Respiratory: Clear to auscultation bilaterally. No wheezes, rales or rhonchi. Normal respiratory effort  Cardiovascular: Irregular Irregularly , no M/R/G, no edema, 2+ Peripheral Pulses  Gastrointestinal: soft, nontender, nondistended, +BS, no hernia  Skin: warm, dry, no rash  Neurologic: sensation grossly intact, CN grossly intact, non-focal exam  Musculoskeletal: no clubbing, no cyanosis, no joint swelling  Psychiatric: AOX3, appropriate mood, affect

## 2021-02-26 NOTE — ED PROVIDER NOTE - PMH
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

## 2021-02-26 NOTE — H&P ADULT - PROBLEM SELECTOR PLAN 1
- Epistaxis resolved after applying pressure in ED  - CBC stable , repeat AM  - consider starting AC if CBC stable and no epistaxis episodes overnight   - monitor - Epistaxis resolved after applying pressure in ED  - CBC stable , repeat AM  - consider starting AC if CBC stable and no epistaxis episodes overnight   - monitor  - Nasal Spray (Ocean) - Possibly associated with dry air, smoking  - Epistaxis resolved after applying pressure in ED  - CBC = 15.1, repeat AM  - consider starting AC (for new onset A-fib) if CBC stable and no epistaxis episodes overnight   - monitor  - Nasal Spray (Ocean) to help humidify nasal passages

## 2021-02-26 NOTE — ED ADULT NURSE NOTE - OBJECTIVE STATEMENT
Receive pt. in Intake 10c alert and oriented x 4, presenting to the ER with complaints of nose bleed. Pt. stated " today my nose started bleeding both were bleeding now only one is bleeding". No active bleeding, labs sent place on cardiac monitor, will continue to monitor.

## 2021-02-26 NOTE — H&P ADULT - ATTENDING COMMENTS
75 year old male, being managed for Epistaxis, Near syncope and New onset atrial fibrillation. 75 year old male, being managed for Epistaxis, Near syncope and New onset atrial fibrillation.  Presented w/ report of bleeding from nares with draining into the mouth x ~ 2 hours; associated dizziness with episode of presyncope.  Bleeding resolved w/ pressure application.  Bleeding possibly sequela of low humidity (dry air during the winter) as well as smoking.  ECG w/ A-fib at 83 bpm (rate controlled w/ PTA B-blocker prescribed for aortic aneurysm).  TSH = 0.99.  Already seen by Cardiology team (appreciated); suggestion for AC when optimal and ENT eval.  Hgb = 15.1.  No sign of occult bleeding presently.  CTA w/o sign of aortic aneurysm, but does demonstrate renal artery aneurysms (stable).  F/U AM lab-work.  F/U w/ Cardiology team for further recommendations.  Encourage smoking cessation.    All other management as prescribed.

## 2021-02-26 NOTE — ED PROVIDER NOTE - CLINICAL SUMMARY MEDICAL DECISION MAKING FREE TEXT BOX
diego: pt presents with nose bleed, he reports was very heavy, now stopped.  pt with nearsynciope in the wwaiting room.  pt without afib on last ecg, and no history of such in the chart.  he does have a hx of colon and bladder ca, under treatment.  also with hx 4.5 cm aortic aneurism.  here pt wit ecg, rate 79, afib. small clots in nose, false left eye.  cardiologist is jose angel brooks who agrees with admitting near syncope in a pt with new afib, bleeding and aortic aneurism  cor: rrr pos s1s2, lungs clear, abd soft no r/g/t.  will ct for ascending aortic aneurism with new afib, needs telly as well in admission.  checking labs as well.  will defer to cardiology in considering starting ac

## 2021-02-26 NOTE — ED ADULT NURSE NOTE - CHIEF COMPLAINT QUOTE
nosebleed this pm,started while driving. no bleeding at present.  . pt reports dizziness at present. on no blood  fs 85

## 2021-02-26 NOTE — H&P ADULT - PROBLEM SELECTOR PLAN 2
- TTE AM  - EKG: AFIB@76bpm  - orthostatics   - monitor on tele   - fall precautions   - ambulate w/ assistance

## 2021-02-26 NOTE — H&P ADULT - PROBLEM SELECTOR PLAN 7
1.  Name of PCP:   2.  PCP Contacted on Admission: [ ] Y    [x] N - admitted at night    3.  PCP contacted at Discharge: [ ] Y    [ ] N    [ ] N/A  4.  Post-Discharge Appointment Date and Location:  5.  Summary of Handoff given to PCP: DVT ppx: HOLD off AC for now due to recent epistaxis

## 2021-02-26 NOTE — CONSULT NOTE ADULT - SUBJECTIVE AND OBJECTIVE BOX
HISTORY OF PRESENT ILLNESS:  Patient is a 75y old  Male who presents with a chief complaint of Epistaxis, Near syncope, New onset atrial fibrillation (26 Feb 2021 21:23)    HPI:  76 y/o male with PMHx of aortic aneurysm 4.5cm on beta blockers, colon and bladder cancer s/p chemo years ago in remission, left prosthetic eyeball since birth, presents to ED for epistaxis. Pt states that nose bleed was not provoked  started earlier prior to coming to the hospital, while in the hospital epistaxis resolved with pressure but pt felt lightheadedness. Pt states he felt lightheaded because of the nose bleed. Denies chest pain, palpitations, cough, SOB, CURRIE, PND, orthopnea, increased lower extremity edema, fever chills, abdominal pain, N/V/C/[place] home Pt Home meds: Percocet and Metoprolol. Cardiology called for New AFIB noted in the ED.  Cardiologist Dr. Arnold Ward called and agreed with admission        Allergies    No Known Allergies  Intolerances  morphine (Other)  	    MEDICATIONS:  metoprolol succinate ER 50 milliGRAM(s) Oral daily  oxycodone    5 mG/acetaminophen 325 mG 2 Tablet(s) Oral every 6 hours PRN  bisacodyl 5 milliGRAM(s) Oral at bedtime  sodium chloride 0.65% Nasal 1 Spray(s) Both Nostrils two times a day      PAST MEDICAL & SURGICAL HISTORY:  Aortic aneurysm  Lumbar disc disease  Colon cancer  Malignant neoplasm of colon, unspecified part of colon  s/p microsurgery at [facility]  no chemo/XRT in remission since 2012  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  Denies Recent travel      REVIEW OF SYSTEMS  See HPI. Otherwise, 10 point ROS done and otherwise negative.  >>> <<<    PHYSICAL EXAM:  T(C): 36.6 (02-26-21 @ 18:16), Max: 36.7 (02-26-21 @ 14:50)  HR: 82 (02-26-21 @ 18:16) (80 - 82)  BP: 128/78 (02-26-21 @ 18:16) (128/78 - 133/80)  RR: 17 (02-26-21 @ 18:16) (16 - 17)  SpO2: 100% (02-26-21 @ 18:16) (100% - 100%)  Wt(kg): --    Appearance: Normal	  Cardiovascular: Normal S1 S2, No JVD, No murmurs, No edema  Respiratory: Lungs clear to auscultation	  Psychiatry: A & O x 3, Mood & affect appropriate  Gastrointestinal:  Soft, Non-tender, + BS	  Skin: No rashes, No ecchymoses, No cyanosis	  Neurologic: Non-focal, A&Ox3, nonfocal, POOL x 4  Extremities: Normal range of motion, No clubbing, cyanosis or edema  Vascular: Peripheral pulses palpable 2+ bilaterally    I&O's Summary    	 	  LABS:	 	                          15.1   7.48  )-----------( 181      ( 26 Feb 2021 18:26 )             45.9     02-26    123<L>  |  97<L>  |  14  ----------------------------<  85  TNP   |  18<L>  |  0.65  02-26    140  |  105  |  16  ----------------------------<  90  5.5<H>   |  23  |  0.72  Ca    7.6<L>      26 Feb 2021 20:08  Ca    9.0      26 Feb 2021 18:26  TPro  7.0  /  Alb  3.8  /  TBili  0.4  /  DBili  x   /  AST  40  /  ALT  22  /  AlkPhos  62  02-26  LIVER FUNCTIONS - ( 26 Feb 2021 18:26 )  Alb: 3.8 g/dL / Pro: 7.0 g/dL / ALK PHOS: 62 U/L / ALT: 22 U/L / AST: 40 U/L / GGT: x         proBNP:   Lipid Profile:   HgA1c:   TSH: Thyroid Stimulating Hormone, Serum: 0.99 uIU/mL (02-26 @ 18:26)  PT/INR - ( 26 Feb 2021 18:26 )   PT: 11.6 sec;   INR: 1.01 ratio     PTT - ( 26 Feb 2021 18:26 )  PTT:29.3 sec

## 2021-02-26 NOTE — ED PROVIDER NOTE - ATTENDING CONTRIBUTION TO CARE
diego: pt presents with nose bleed, he reports was very heavy, now stopped.  pt with nearsynciope in the wwaiting room.  pt without afib on last ecg, and no history of such in the chart.  he does have a hx of colon and bladder ca, under treatment.  also with hx 4.5 cm aortic aneurism.  here pt wit ecg, rate 79, afib. small clots in nose, false left eye.  cardiologist is jose angel brooks who agrees with admitting near syncope in a pt with new afib, bleeding and aortic aneurism  cor: rrr pos s1s2, lungs clear, abd soft no r/g/t.  will ct for ascending aortic aneurism with new afib, needs telly as well in admission.  checking labs as well.  will defer to cardiology in considering starting ac    I performed a history and physical exam of the patient and discussed their management with the resident and /or advanced care provider. I reviewed the resident and /or ACP's note and agree with the documented findings and plan of care. My medical decison making and observations are found above.

## 2021-02-26 NOTE — ED PROVIDER NOTE - PROGRESS NOTE DETAILS
ekg repeated. HR 83. Afib. BERTO Bales- as per Dr. Gomez, spoke with patient's outpatient cardiologist Dr. Arnold Smith [phone] and suggesting to admit to hospitalist. spoke with hospitalist will f/u CTA results, MAR paged

## 2021-02-26 NOTE — CONSULT NOTE ADULT - ASSESSMENT
76 y/o male with PMHx of aortic aneurysm 4.5cm on beta blockers, colon and bladder cancer s/p chemo years ago in remission, left prosthetic eyeball since birth, presents to ED for epistaxis. Pt states that nose bleed was not provoked  started earlier prior to coming to the hospital, while in the hospital epistaxis resolved with pressure but pt felt lightheadedness. Cardiology called for New AFIB noted in the ED.

## 2021-02-26 NOTE — H&P ADULT - PROBLEM SELECTOR PLAN 4
- h/o Aortic Aneurysm 4.5cm  - At home Pt is on BB  - CTA Abdomen and Pelvis: IMPRESSION:  No acute intra-abdominal pathology. No evidence of abdominal aortic aneurysm.  Renal artery aneurysms are stable. - MCV = 105.5 (Hgb = 15.1)  - could be medication induced  - TSH within acceptable range (0.99)  - Will get Vit B12, Folate levels in the AM

## 2021-02-26 NOTE — H&P ADULT - PROBLEM SELECTOR PLAN 6
DVT ppx: HOLD off AC for now due to recent epistaxis - chronic back pain  - Pt takes Percocet 10/325 PO q6  - Dulcolax PRN constipation  - monitor

## 2021-02-26 NOTE — H&P ADULT - NSRESEARCHGRNTASSESS_GEN_ALL_CORE FT
Not applicable: This is a surgical and/or non-medical patient IMPROVE-DD Assessment completed: Feb 26 2021 10:57PM

## 2021-02-26 NOTE — CONSULT NOTE ADULT - PROBLEM SELECTOR RECOMMENDATION 9
Pt noted to be in new a-fib. Currently rate controlled    CHADSVASC score:    Has Bled score:   Admit to telemetry  Continue Metoprolol   Heparin gtt full anticoagulation unless there is bleeding risk  EKG, PRN chest pain  Echo in AM to R/O thrombus and evaluate LV function and wall motion abnormality Pt noted to be in new a-fib. Currently rate controlled    CHADSVASC score:  3  Has Bled score: 3  Admit to telemetry  Continue Metoprolol   Pt would benefit from Heparin gtt full anticoagulation for new Afib. Will defer decision to anticoagulate  to primary team regarding bleeding risk.  EKG, PRN chest pain  Echo in AM to R/O thrombus and evaluate LV function and wall motion abnormality

## 2021-02-26 NOTE — H&P ADULT - PROBLEM SELECTOR PLAN 5
- chronic back pain  - Pt takes Percocet 10/325 PO q6  - monitor - chronic back pain  - Pt takes Percocet 10/325 PO q6  - Dulcolax PRN constipation  - monitor - h/o Aortic Aneurysm 4.5cm  - At home Pt is on BB  - CTA Abdomen and Pelvis: IMPRESSION:  No acute intra-abdominal pathology. No evidence of abdominal aortic aneurysm.  Renal artery aneurysms are stable.

## 2021-02-26 NOTE — H&P ADULT - PROBLEM SELECTOR PLAN 3
- CHAD2-VASC: 4   - EKG: AFIB@76bpm  - Hold off on AC for now due to recent Epistaxis   - f/u coags daily   - continue rate control w/ Metoprolol   - no caffeine   - Monitor on TELE  - Cardiologist called by ED , Dr. Arnold Ward ( Cardiologist)  - unclear if Dr. Arnold Ward ( Cardiologist) is associated with [facility]. Please clarify AM if Dr. Arnold Ward ( Cardiologist) will see the patient in the morning - CHAD2-VASC: 4   - EKG: AFIB@76bpm  - Hold off on AC for now due to recent Epistaxis   - f/u coags daily   - continue rate control w/ Metoprolol   - no caffeine   - Monitor on TELE  - Cardiologist called by ED , Dr. Arnold Ward ( Cardiologist)  - Spoke with Dr. Arnold Ward ( Cardiologist) , who states Dr. Eileen matthews [facility], Dr. Brown recs for house cardiology consult. - CHAD2-VASC: 4   - EKG: AFIB@76bpm  - Hold off on AC for now due to recent Epistaxis (H/H Stable)  - f/u coags daily   - continue rate control w/ Metoprolol   - no caffeine   - Monitor on TELE  - Cardiologist called by ED , Dr. Arnold Ward ( Cardiologist)  - Spoke with Dr. Arnold Ward ( Cardiologist) , who states Dr. Eileen matthews [facility], Dr. Brown recs for house cardiology consult.  - Will Call [facility] for consult - CHAD2-VASC: 4   - EKG: AFIB@76bpm  - Hold off on AC for now due to recent Epistaxis (H/H Stable)  - f/u coags daily   - continue rate control w/ Metoprolol (PTA prescription in the setting of Aortic aneurysm)  - no caffeine   - Monitor on TELE  - Cardiologist called by ED , Dr. Arnold Ward ( Cardiologist)  - Spoke with Dr. Arnold Ward ( Cardiologist) , who states Dr. Eileen matthews [facility], Dr. Brown recs for house cardiology consult.  - Will Call [facility] for consult

## 2021-02-26 NOTE — ED PROVIDER NOTE - ENMT, MLM
Airway patent,. Mouth with normal mucosa. Throat has no vesicles, no oropharyngeal exudates and uvula is midline. No active epistaxis. B/l nares with small clots. No hematoma.

## 2021-02-26 NOTE — H&P ADULT - ASSESSMENT
76 y/o male with PMHx of aortic aneurysm 4.5cm on beta blockers, colon and bladder cancer s/p chemo years ago in remission, left prosthetic eyeball since birth, presents to ED for epistaxis Pt is being admitted for Near Syncope 2/2 likely new Afib and Epistaxis.

## 2021-02-26 NOTE — H&P ADULT - HISTORY OF PRESENT ILLNESS
76 y/o male with PMHx of aortic aneurysm 4.5cm on beta blockers, colon and bladder cancer s/p chemo years ago in remission, left prosthetic eyeball since birth, presents to ED for epistaxis. Pt states that nose bleed was not provoked  started earlier prior to coming to the hospital, while in the hospital epistaxis resolved with pressure but pt felt lightheadedness. Pt states he felt lightheaded because of the nose bleed. Pt denies NO chest pain, SOB, CURRIE, PND, orthopnea, palpitations, diaphoresis, syncope, increased lower extremity edema, fever chills, malaise, myalgias, anorexia, weight changes ( loss or gain), nightsweats, generalized fatigue abdominal pain, N/V/C/D BRBPR, melena, urinary symptoms, cough, and wheezing. At home Pt only takes Percocet and Metoprolol, denies of taking AC at home.    In ED: Pt was founded to be in [facility], Dr. Arnold Ward ( Outpatient Cardiologist), who agrees the Patient to be admitted.

## 2021-02-26 NOTE — H&P ADULT - NSHPREVIEWOFSYSTEMS_GEN_ALL_CORE
Constitutional Symptoms: No weakness, fevers, chills, weight loss  Eyes: No visual changes, headache, eye pain, double vision  Ears, Nose, Mouth, Throat: + epistaxis No runny nose, sinus pain, ear pain, tinnitus, sore throat, dysphagia, odynophagia  Cardiovascular: No chest pain, palpitations, edema  Respiratory: No cough, wheezing, hemoptysis, shortness of breath  Gastrointestinal: No abdominal pain, dysphagia, anorexia, nausea/vomiting, diarrhea/constipation, hematemesis, BRBPR, melena  Genitourinary: No dysuria, frequency, hematuria  Musculoskeletal: No joint pain, joint swelling, decreased ROM  Skin: No pruritus, rashes, lesions, wounds  Neurologic:  +lightheadedness No seizures, headache, paraesthesia, numbness, limb weakness    Positives and pertinent negatives noted and all other systems negative.

## 2021-02-26 NOTE — ED ADULT TRIAGE NOTE - CHIEF COMPLAINT QUOTE
nosebleed this pm,started while driving. no bleeding at present. states on blood thinner,unk name nosebleed this pm,started while driving. no bleeding at present. states on blood thinner,unk name. pt reports dizziness at present. nosebleed this pm,started while driving. no bleeding at present.  . pt reports dizziness at present. on no blood thinner nosebleed this pm,started while driving. no bleeding at present.  . pt reports dizziness at present. on no blood  fs 85

## 2021-02-26 NOTE — ED PROVIDER NOTE - OBJECTIVE STATEMENT
74 y/o male with pmhx of aortic aneurysm 4.5cm on beta blockers, colon and bladder cancer s/p chemo years ago in remission, left prosthetic eyeball since birth, presents to ED for epistaxis. Pt states he had b/l bleeding from both nares and draining out of his mouth as well. Started 2 hours ago. Self resolved with pressure. No active bleeding. Pt states he had an episode of dizziness while in triage while he was bleeding, thinks it was because of the blood. +presyncope. No syncope. Self resolved. Feels well now. No fever, cp, sob, abd pain, n/v, weakness, numbness, tingling. Pt not on blood thinners or A/C.

## 2021-02-26 NOTE — H&P ADULT - NSICDXPASTMEDICALHX_GEN_ALL_CORE_FT
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

## 2021-02-27 ENCOUNTER — TRANSCRIPTION ENCOUNTER (OUTPATIENT)
Age: 76
End: 2021-02-27

## 2021-02-27 DIAGNOSIS — D75.89 OTHER SPECIFIED DISEASES OF BLOOD AND BLOOD-FORMING ORGANS: ICD-10-CM

## 2021-02-27 PROCEDURE — 99233 SBSQ HOSP IP/OBS HIGH 50: CPT

## 2021-02-27 RX ORDER — OXYMETAZOLINE HYDROCHLORIDE 0.5 MG/ML
2 SPRAY NASAL EVERY 12 HOURS
Refills: 0 | Status: DISCONTINUED | OUTPATIENT
Start: 2021-02-27 | End: 2021-03-02

## 2021-02-27 RX ADMIN — Medication 1 SPRAY(S): at 22:07

## 2021-02-27 RX ADMIN — Medication 5 MILLIGRAM(S): at 22:07

## 2021-02-27 RX ADMIN — Medication 50 MILLIGRAM(S): at 06:30

## 2021-02-27 RX ADMIN — OXYCODONE AND ACETAMINOPHEN 2 TABLET(S): 5; 325 TABLET ORAL at 19:40

## 2021-02-27 RX ADMIN — Medication 1 SPRAY(S): at 06:31

## 2021-02-27 NOTE — PROGRESS NOTE ADULT - SUBJECTIVE AND OBJECTIVE BOX
[facility] Division of [facility] Medicine  Anna Watson MD  Pager [phone]      Patient is a 75y old  Male who presents with a chief complaint of Epistaxis, Near syncope, New onset atrial fibrillation (26 Feb 2021 23:19)      SUBJECTIVE / OVERNIGHT EVENTS:    remains in afib o/n. Pt denies chest pain, palpitation, sob, dizziness. no new nose bleeds     ADDITIONAL REVIEW OF SYSTEMS:    RESPIRATORY: No cough, wheezing, chills or hemoptysis; No shortness of breath  CARDIOVASCULAR: No chest pain, palpitations, dizziness, or leg swelling  GASTROINTESTINAL: No abdominal or epigastric pain. No nausea, vomiting, or hematemesis; No diarrhea or constipation. No melena or hematochezia.      MEDICATIONS  (STANDING):  bisacodyl 5 milliGRAM(s) Oral at bedtime  metoprolol succinate ER 50 milliGRAM(s) Oral daily  sodium chloride 0.65% Nasal 1 Spray(s) Both Nostrils two times a day    MEDICATIONS  (PRN):  oxycodone    5 mG/acetaminophen 325 mG 2 Tablet(s) Oral every 6 hours PRN Moderate Pain (4 - 6)  oxymetazoline 0.05% Nasal Spray 2 Spray(s) Both Nostrils every 12 hours PRN Epistaxis      CAPILLARY BLOOD GLUCOSE        I&O's Summary      PHYSICAL EXAM:  Vital Signs Last 24 Hrs  T(C): 36.6 (27 Feb 2021 12:22), Max: 36.6 (26 Feb 2021 18:16)  T(F): 97.9 (27 Feb 2021 12:22), Max: 97.9 (27 Feb 2021 12:22)  HR: 83 (27 Feb 2021 12:22) (78 - 83)  BP: 99/66 (27 Feb 2021 12:22) (99/66 - 133/91)  BP(mean): --  RR: 18 (27 Feb 2021 12:22) (17 - 18)  SpO2: 97% (27 Feb 2021 12:22) (97% - 100%)    CONSTITUTIONAL: NAD  EYES: PERRLA; conjunctiva and sclera clear  ENMT: Moist oral mucosa, no pharyngeal injection or exudates;  NECK: Supple, no palpable masses;  RESPIRATORY: Normal respiratory effort; lungs are clear to auscultation bilaterally  CARDIOVASCULAR: irregular rate and rhythm, normal S1 and S2, no murmur/rub/gallop; No lower extremity edema; Peripheral pulses are 2+ bilaterally  ABDOMEN: Nontender to palpation, normoactive bowel sounds, no rebound/guarding;   MUSCLOSKELETAL:  Normal gait; no clubbing or cyanosis of digits; no joint swelling or tenderness to palpation  PSYCH: A+O to person, place, and time; affect appropriate  NEUROLOGY: CN 2-12 are intact and symmetric; no gross sensory deficits;   SKIN: No rashes; no palpable lesions    LABS:                        15.1   7.48  )-----------( 181      ( 26 Feb 2021 18:26 )             45.9     02-26    123<L>  |  97<L>  |  14  ----------------------------<  85  TNP   |  18<L>  |  0.65    Ca    7.6<L>      26 Feb 2021 20:08    TPro  7.0  /  Alb  3.8  /  TBili  0.4  /  DBili  x   /  AST  40  /  ALT  22  /  AlkPhos  62  02-26    PT/INR - ( 26 Feb 2021 18:26 )   PT: 11.6 sec;   INR: 1.01 ratio         PTT - ( 26 Feb 2021 18:26 )  PTT:29.3 sec            RADIOLOGY & ADDITIONAL TESTS:  Results Reviewed:   Imaging Personally Reviewed:  Electrocardiogram Personally Reviewed:    COORDINATION OF CARE:  Care Discussed with Consultants/Other Providers [Y/N]:  Prior or Outpatient Records Reviewed [Y/N]:

## 2021-02-27 NOTE — CHART NOTE - NSCHARTNOTEFT_GEN_A_CORE
called ENT and spoke with Resident Dr. Slaughter regarding anticoagulation after recent epistaxis. Advised that as long as no other contraindications are present to start anticoagulation and monitor for bleeding. In the event of recurrent epistaxis use Afrin as needed and apply pressure for 20 minutes.  If recurrent epistaxis occurs then recall ENT. Updated Dr. Watson on  recommendations. Heparin gtt and Afrin ordered.

## 2021-02-28 LAB
ANION GAP SERPL CALC-SCNC: 10 MMOL/L — SIGNIFICANT CHANGE UP (ref 7–14)
APTT BLD: 29.1 SEC — SIGNIFICANT CHANGE UP (ref 27–36.3)
APTT BLD: 42.2 SEC — HIGH (ref 27–36.3)
BUN SERPL-MCNC: 23 MG/DL — SIGNIFICANT CHANGE UP (ref 7–23)
CALCIUM SERPL-MCNC: 8.9 MG/DL — SIGNIFICANT CHANGE UP (ref 8.4–10.5)
CHLORIDE SERPL-SCNC: 106 MMOL/L — SIGNIFICANT CHANGE UP (ref 98–107)
CO2 SERPL-SCNC: 23 MMOL/L — SIGNIFICANT CHANGE UP (ref 22–31)
CREAT SERPL-MCNC: 0.98 MG/DL — SIGNIFICANT CHANGE UP (ref 0.5–1.3)
GLUCOSE SERPL-MCNC: 83 MG/DL — SIGNIFICANT CHANGE UP (ref 70–99)
HCT VFR BLD CALC: 42.1 % — SIGNIFICANT CHANGE UP (ref 39–50)
HCT VFR BLD CALC: 45.1 % — SIGNIFICANT CHANGE UP (ref 39–50)
HGB BLD-MCNC: 14.2 G/DL — SIGNIFICANT CHANGE UP (ref 13–17)
HGB BLD-MCNC: 14.6 G/DL — SIGNIFICANT CHANGE UP (ref 13–17)
MAGNESIUM SERPL-MCNC: 2.3 MG/DL — SIGNIFICANT CHANGE UP (ref 1.6–2.6)
MCHC RBC-ENTMCNC: 32.4 GM/DL — SIGNIFICANT CHANGE UP (ref 32–36)
MCHC RBC-ENTMCNC: 33.7 GM/DL — SIGNIFICANT CHANGE UP (ref 32–36)
MCHC RBC-ENTMCNC: 34.5 PG — HIGH (ref 27–34)
MCHC RBC-ENTMCNC: 34.7 PG — HIGH (ref 27–34)
MCV RBC AUTO: 102.9 FL — HIGH (ref 80–100)
MCV RBC AUTO: 106.6 FL — HIGH (ref 80–100)
NRBC # BLD: 0 /100 WBCS — SIGNIFICANT CHANGE UP
NRBC # BLD: 0 /100 WBCS — SIGNIFICANT CHANGE UP
NRBC # FLD: 0 K/UL — SIGNIFICANT CHANGE UP
NRBC # FLD: 0 K/UL — SIGNIFICANT CHANGE UP
PLATELET # BLD AUTO: 175 K/UL — SIGNIFICANT CHANGE UP (ref 150–400)
PLATELET # BLD AUTO: 194 K/UL — SIGNIFICANT CHANGE UP (ref 150–400)
POTASSIUM SERPL-MCNC: 4.5 MMOL/L — SIGNIFICANT CHANGE UP (ref 3.5–5.3)
POTASSIUM SERPL-SCNC: 4.5 MMOL/L — SIGNIFICANT CHANGE UP (ref 3.5–5.3)
RBC # BLD: 4.09 M/UL — LOW (ref 4.2–5.8)
RBC # BLD: 4.23 M/UL — SIGNIFICANT CHANGE UP (ref 4.2–5.8)
RBC # FLD: 11.9 % — SIGNIFICANT CHANGE UP (ref 10.3–14.5)
RBC # FLD: 11.9 % — SIGNIFICANT CHANGE UP (ref 10.3–14.5)
SODIUM SERPL-SCNC: 139 MMOL/L — SIGNIFICANT CHANGE UP (ref 135–145)
WBC # BLD: 7.06 K/UL — SIGNIFICANT CHANGE UP (ref 3.8–10.5)
WBC # BLD: 7.52 K/UL — SIGNIFICANT CHANGE UP (ref 3.8–10.5)
WBC # FLD AUTO: 7.06 K/UL — SIGNIFICANT CHANGE UP (ref 3.8–10.5)
WBC # FLD AUTO: 7.52 K/UL — SIGNIFICANT CHANGE UP (ref 3.8–10.5)

## 2021-02-28 PROCEDURE — 99233 SBSQ HOSP IP/OBS HIGH 50: CPT

## 2021-02-28 RX ORDER — HEPARIN SODIUM 5000 [USP'U]/ML
INJECTION INTRAVENOUS; SUBCUTANEOUS
Qty: 25000 | Refills: 0 | Status: DISCONTINUED | OUTPATIENT
Start: 2021-02-28 | End: 2021-03-02

## 2021-02-28 RX ORDER — HEPARIN SODIUM 5000 [USP'U]/ML
6500 INJECTION INTRAVENOUS; SUBCUTANEOUS EVERY 6 HOURS
Refills: 0 | Status: DISCONTINUED | OUTPATIENT
Start: 2021-02-28 | End: 2021-03-02

## 2021-02-28 RX ORDER — HEPARIN SODIUM 5000 [USP'U]/ML
3000 INJECTION INTRAVENOUS; SUBCUTANEOUS EVERY 6 HOURS
Refills: 0 | Status: DISCONTINUED | OUTPATIENT
Start: 2021-02-28 | End: 2021-03-02

## 2021-02-28 RX ADMIN — Medication 5 MILLIGRAM(S): at 19:26

## 2021-02-28 RX ADMIN — Medication 1 SPRAY(S): at 05:44

## 2021-02-28 RX ADMIN — Medication 1 SPRAY(S): at 17:25

## 2021-02-28 RX ADMIN — Medication 50 MILLIGRAM(S): at 05:44

## 2021-02-28 RX ADMIN — OXYCODONE AND ACETAMINOPHEN 2 TABLET(S): 5; 325 TABLET ORAL at 18:35

## 2021-02-28 RX ADMIN — HEPARIN SODIUM 3000 UNIT(S): 5000 INJECTION INTRAVENOUS; SUBCUTANEOUS at 18:31

## 2021-02-28 RX ADMIN — HEPARIN SODIUM 1700 UNIT(S)/HR: 5000 INJECTION INTRAVENOUS; SUBCUTANEOUS at 18:30

## 2021-02-28 RX ADMIN — HEPARIN SODIUM 1500 UNIT(S)/HR: 5000 INJECTION INTRAVENOUS; SUBCUTANEOUS at 11:51

## 2021-02-28 NOTE — PROGRESS NOTE ADULT - SUBJECTIVE AND OBJECTIVE BOX
DATE OF SERVICE: 02-28-21 @ 22:32    Patient is a 75y old  Male who presents with a chief complaint of Epistaxis, Near syncope, New onset atrial fibrillation (28 Feb 2021 11:06)      INTERVAL HISTORY: feels ok    TELEMETRY Personally reviewed: AF  	  MEDICATIONS:  metoprolol succinate ER 50 milliGRAM(s) Oral daily        PHYSICAL EXAM:  T(C): 36.8 (02-28-21 @ 21:18), Max: 37.2 (02-28-21 @ 13:04)  HR: 87 (02-28-21 @ 21:18) (83 - 89)  BP: 96/67 (02-28-21 @ 21:18) (95/65 - 102/70)  RR: 16 (02-28-21 @ 21:18) (16 - 18)  SpO2: 97% (02-28-21 @ 21:18) (97% - 98%)  Wt(kg): --  I&O's Summary    28 Feb 2021 07:01  -  28 Feb 2021 22:32  --------------------------------------------------------  IN: 285 mL / OUT: 0 mL / NET: 285 mL          Appearance: In no distress	  HEENT:    PERRL, EOMI	  Cardiovascular:  S1 S2, No JVD  Respiratory: Lungs clear to auscultation	  Gastrointestinal:  Soft, Non-tender, + BS	  Vascularature:  No edema of LE  Psychiatric: Appropriate affect   Neuro: no acute focal deficits                               14.2   7.52  )-----------( 175      ( 28 Feb 2021 18:03 )             42.1     02-28    139  |  106  |  23  ----------------------------<  83  4.5   |  23  |  0.98    Ca    8.9      28 Feb 2021 07:42  Mg     2.3     02-28          Labs personally reviewed      Assessment and Plan:   · Assessment	  74 y/o male with PMHx of aortic aneurysm 4.5cm on beta blockers, colon and bladder cancer s/p chemo years ago in remission, left prosthetic eyeball since birth, presents to ED for epistaxis Pt is being admitted for Near Syncope 2/2 likely new Afib and Epistaxis.    Problem/Plan - 1:  ·  Problem: New onset atrial fibrillation.  Plan: - CHAD2-VASC: 4   - TSH wnl   - start heparin gtt for AC. if no new nose bleed will transition to NOAC  - continue rate control w/ Metoprolol   - EP Dr Morgan consulted for DCCV  - make NPO past midnight and discuss with EP in AM if any plans for DCCV Monday    Problem/Plan - 2:  ·  Problem: Aortic aneurysm without rupture, unspecified portion of aorta.  Plan: - h/o Aortic Aneurysm 4.5cm  - At home Pt is on BB  - CTA Abdomen and Pelvis: No acute intra-abdominal pathology. No evidence of abdominal aortic aneurysm.  Renal artery aneurysms are stable.              Todd Arellano DO [facility]  Cardiovascular Medicine  [address] [place] [address], [address]  Office: [phone]  Cell: [phone]

## 2021-02-28 NOTE — PROGRESS NOTE ADULT - SUBJECTIVE AND OBJECTIVE BOX
[facility] Division of Hospital Medicine  Anna Watson MD  Pager [phone]      Patient is a 75y old  Male who presents with a chief complaint of Epistaxis, Near syncope, New onset atrial fibrillation (27 Feb 2021 16:12)      SUBJECTIVE / OVERNIGHT EVENTS:    pt remains in rate controlled afib on tele. No new nose bleed. heparin gtt has not started yet. Pt offers no new complaint     ADDITIONAL REVIEW OF SYSTEMS:    RESPIRATORY: No cough, wheezing, chills or hemoptysis; No shortness of breath  CARDIOVASCULAR: No chest pain, palpitations, dizziness, or leg swelling  GASTROINTESTINAL: No abdominal or epigastric pain. No nausea, vomiting, or hematemesis; No diarrhea or constipation. No melena or hematochezia.      MEDICATIONS  (STANDING):  bisacodyl 5 milliGRAM(s) Oral at bedtime  heparin  Infusion.  Unit(s)/Hr (15 mL/Hr) IV Continuous <Continuous>  metoprolol succinate ER 50 milliGRAM(s) Oral daily  sodium chloride 0.65% Nasal 1 Spray(s) Both Nostrils two times a day    MEDICATIONS  (PRN):  heparin   Injectable 6500 Unit(s) IV Push every 6 hours PRN For aPTT less than 40  heparin   Injectable 3000 Unit(s) IV Push every 6 hours PRN For aPTT between 40 - 57  oxycodone    5 mG/acetaminophen 325 mG 2 Tablet(s) Oral every 6 hours PRN Moderate Pain (4 - 6)  oxymetazoline 0.05% Nasal Spray 2 Spray(s) Both Nostrils every 12 hours PRN Epistaxis      CAPILLARY BLOOD GLUCOSE        I&O's Summary      PHYSICAL EXAM:  Vital Signs Last 24 Hrs  T(C): 36.6 (28 Feb 2021 05:41), Max: 36.6 (27 Feb 2021 12:22)  T(F): 97.9 (28 Feb 2021 05:41), Max: 97.9 (27 Feb 2021 12:22)  HR: 83 (28 Feb 2021 05:41) (80 - 88)  BP: 102/70 (28 Feb 2021 05:41) (99/66 - 114/78)  BP(mean): --  RR: 18 (28 Feb 2021 05:41) (18 - 18)  SpO2: 98% (28 Feb 2021 05:41) (97% - 99%)    CONSTITUTIONAL: NAD  EYES: PERRLA; conjunctiva and sclera clear  ENMT: Moist oral mucosa, no pharyngeal injection or exudates;  NECK: Supple, no palpable masses;  RESPIRATORY: Normal respiratory effort; lungs are clear to auscultation bilaterally  CARDIOVASCULAR: irregular rate and rhythm, normal S1 and S2, no murmur/rub/gallop; No lower extremity edema; Peripheral pulses are 2+ bilaterally  ABDOMEN: Nontender to palpation, normoactive bowel sounds, no rebound/guarding;   MUSCLOSKELETAL:  Normal gait; no clubbing or cyanosis of digits; no joint swelling or tenderness to palpation  PSYCH: A+O to person, place, and time; affect appropriate  NEUROLOGY: CN 2-12 are intact and symmetric; no gross sensory deficits;   SKIN: No rashes; no palpable lesions    LABS:                        14.6   7.06  )-----------( 194      ( 28 Feb 2021 07:42 )             45.1     02-28    139  |  106  |  23  ----------------------------<  83  4.5   |  23  |  0.98    Ca    8.9      28 Feb 2021 07:42  Mg     2.3     02-28    TPro  7.0  /  Alb  3.8  /  TBili  0.4  /  DBili  x   /  AST  40  /  ALT  22  /  AlkPhos  62  02-26    PT/INR - ( 26 Feb 2021 18:26 )   PT: 11.6 sec;   INR: 1.01 ratio         PTT - ( 26 Feb 2021 18:26 )  PTT:29.3 sec            RADIOLOGY & ADDITIONAL TESTS:  Results Reviewed:   Imaging Personally Reviewed:  Electrocardiogram Personally Reviewed:    COORDINATION OF CARE:  Care Discussed with Consultants/Other Providers [Y/N]:  Prior or Outpatient Records Reviewed [Y/N]:

## 2021-03-01 LAB
24R-OH-CALCIDIOL SERPL-MCNC: 22.4 NG/ML — LOW (ref 30–80)
ANION GAP SERPL CALC-SCNC: 10 MMOL/L — SIGNIFICANT CHANGE UP (ref 7–14)
APTT BLD: 123.5 SEC — CRITICAL HIGH (ref 27–36.3)
APTT BLD: 64 SEC — HIGH (ref 27–36.3)
APTT BLD: >200 SEC — CRITICAL HIGH (ref 27–36.3)
BUN SERPL-MCNC: 22 MG/DL — SIGNIFICANT CHANGE UP (ref 7–23)
CALCIUM SERPL-MCNC: 8.3 MG/DL — LOW (ref 8.4–10.5)
CHLORIDE SERPL-SCNC: 108 MMOL/L — HIGH (ref 98–107)
CO2 SERPL-SCNC: 20 MMOL/L — LOW (ref 22–31)
CREAT SERPL-MCNC: 0.87 MG/DL — SIGNIFICANT CHANGE UP (ref 0.5–1.3)
GLUCOSE SERPL-MCNC: 87 MG/DL — SIGNIFICANT CHANGE UP (ref 70–99)
HCT VFR BLD CALC: 42.1 % — SIGNIFICANT CHANGE UP (ref 39–50)
HGB BLD-MCNC: 13.8 G/DL — SIGNIFICANT CHANGE UP (ref 13–17)
MAGNESIUM SERPL-MCNC: 2.3 MG/DL — SIGNIFICANT CHANGE UP (ref 1.6–2.6)
MCHC RBC-ENTMCNC: 32.8 GM/DL — SIGNIFICANT CHANGE UP (ref 32–36)
MCHC RBC-ENTMCNC: 34.3 PG — HIGH (ref 27–34)
MCV RBC AUTO: 104.7 FL — HIGH (ref 80–100)
NRBC # BLD: 0 /100 WBCS — SIGNIFICANT CHANGE UP
NRBC # FLD: 0 K/UL — SIGNIFICANT CHANGE UP
PHOSPHATE SERPL-MCNC: 3.1 MG/DL — SIGNIFICANT CHANGE UP (ref 2.5–4.5)
PLATELET # BLD AUTO: 165 K/UL — SIGNIFICANT CHANGE UP (ref 150–400)
POTASSIUM SERPL-MCNC: 4 MMOL/L — SIGNIFICANT CHANGE UP (ref 3.5–5.3)
POTASSIUM SERPL-SCNC: 4 MMOL/L — SIGNIFICANT CHANGE UP (ref 3.5–5.3)
RBC # BLD: 4.02 M/UL — LOW (ref 4.2–5.8)
RBC # FLD: 11.9 % — SIGNIFICANT CHANGE UP (ref 10.3–14.5)
SODIUM SERPL-SCNC: 138 MMOL/L — SIGNIFICANT CHANGE UP (ref 135–145)
WBC # BLD: 7.85 K/UL — SIGNIFICANT CHANGE UP (ref 3.8–10.5)
WBC # FLD AUTO: 7.85 K/UL — SIGNIFICANT CHANGE UP (ref 3.8–10.5)

## 2021-03-01 PROCEDURE — 71250 CT THORAX DX C-: CPT | Mod: 26

## 2021-03-01 PROCEDURE — 99233 SBSQ HOSP IP/OBS HIGH 50: CPT

## 2021-03-01 PROCEDURE — 93306 TTE W/DOPPLER COMPLETE: CPT | Mod: 26

## 2021-03-01 RX ADMIN — HEPARIN SODIUM 1200 UNIT(S)/HR: 5000 INJECTION INTRAVENOUS; SUBCUTANEOUS at 20:41

## 2021-03-01 RX ADMIN — Medication 50 MILLIGRAM(S): at 17:34

## 2021-03-01 RX ADMIN — HEPARIN SODIUM 0 UNIT(S)/HR: 5000 INJECTION INTRAVENOUS; SUBCUTANEOUS at 05:55

## 2021-03-01 RX ADMIN — HEPARIN SODIUM 1400 UNIT(S)/HR: 5000 INJECTION INTRAVENOUS; SUBCUTANEOUS at 06:50

## 2021-03-01 RX ADMIN — Medication 1 SPRAY(S): at 17:34

## 2021-03-01 RX ADMIN — HEPARIN SODIUM 1200 UNIT(S)/HR: 5000 INJECTION INTRAVENOUS; SUBCUTANEOUS at 14:23

## 2021-03-01 RX ADMIN — Medication 1 SPRAY(S): at 05:12

## 2021-03-01 NOTE — PROGRESS NOTE ADULT - SUBJECTIVE AND OBJECTIVE BOX
DATE OF SERVICE: 03-01-21 @ 22:57    Patient is a 75y old  Male who presents with a chief complaint of Epistaxis, Near syncope, New onset atrial fibrillation (01 Mar 2021 15:29)      INTERVAL HISTORY: feels ok    TELEMETRY Personally reviewed: af  	  MEDICATIONS:  metoprolol succinate ER 50 milliGRAM(s) Oral daily        PHYSICAL EXAM:  T(C): 36.9 (03-01-21 @ 21:07), Max: 36.9 (03-01-21 @ 21:07)  HR: 95 (03-01-21 @ 21:07) (61 - 95)  BP: 102/65 (03-01-21 @ 21:07) (101/65 - 120/81)  RR: 18 (03-01-21 @ 21:07) (16 - 18)  SpO2: 97% (03-01-21 @ 21:07) (97% - 98%)  Wt(kg): --  I&O's Summary    28 Feb 2021 07:01  -  01 Mar 2021 07:00  --------------------------------------------------------  IN: 401 mL / OUT: 0 mL / NET: 401 mL          Appearance: In no distress	  HEENT:    PERRL, EOMI	  Cardiovascular:  S1 S2, No JVD  Respiratory: Lungs clear to auscultation	  Gastrointestinal:  Soft, Non-tender, + BS	  Vascularature:  No edema of LE  Psychiatric: Appropriate affect   Neuro: no acute focal deficits                               13.8   7.85  )-----------( 165      ( 01 Mar 2021 05:25 )             42.1     03-01    138  |  108<H>  |  22  ----------------------------<  87  4.0   |  20<L>  |  0.87    Ca    8.3<L>      01 Mar 2021 05:25  Phos  3.1     03-01  Mg     2.3     03-01          Labs personally reviewed    Assessment and Plan:   · Assessment	  76 y/o male with PMHx of aortic aneurysm 4.5cm on beta blockers, colon and bladder cancer s/p chemo years ago in remission, left prosthetic eyeball since birth, presents to ED for epistaxis Pt is being admitted for Near Syncope 2/2 likely new Afib and Epistaxis.    Problem/Plan - 1:  ·  Problem: New onset atrial fibrillation.  Plan: - CHAD2-VASC: 4   - TSH wnl   - start heparin gtt for AC. if no new nose bleed will transition to NOAC  - continue rate control w/ Metoprolol   - EP Dr Morgan consulted for DCCV  - Plan for DCCV in AM    Problem/Plan - 2:  ·  Problem: Aortic aneurysm without rupture, unspecified portion of aorta.  Plan: - h/o Aortic Aneurysm 4.5cm  - At home Pt is on BB  - CTA Abdomen and Pelvis: No acute intra-abdominal pathology. No evidence of abdominal aortic aneurysm.  Renal artery aneurysms are stable.          Todd Arellano DO [facility]  Cardiovascular Medicine  [address], [address]  Office: [phone]  Cell: [phone]            Todd Arellano DO [facility]  Cardiovascular Medicine  [address], [address]  Office: [phone]  Cell: [phone]

## 2021-03-01 NOTE — CONSULT NOTE ADULT - ASSESSMENT
76 y/o male with PMHx of aortic aneurysm 4.5cm on beta blockers, colon and bladder cancer s/p chemo years ago in remission, left prosthetic eyeball since birth, presents to ED with unprovoked epistaxis associated with lightheadedness and new onset atrial fibrillation of questionable duration, rate controlled.    Hemodynamically stable. His home medications are Percocet and metoprolol. Today's echocardiogram shows mild mitral regurgitation and normal LV systolic function with LVEF 66%.   Cardiologist Dr. Arnold Ward called and agreed with admission.  Would opt for rhythm control strategy for the atrial fibrillation.  He is currently on heparin infusion for thromboembolic prophylaxis ([id number]) with no recurrence of epistaxis. I have spoken with the patient at length regarding the risks and benefits to the cardioversion as well as the fact that he will need a prior HENRRY to rule out LA/OBDULIA thrombus. He agrees and has consented.      74 y/o male with PMHx of aortic aneurysm 4.5cm on beta blockers, colon and bladder cancer s/p chemo years ago in remission, left prosthetic eyeball since birth, presents to ED with unprovoked epistaxis associated with lightheadedness and new onset atrial fibrillation of questionable duration, rate controlled.    Hemodynamically stable. His home medications are Percocet and metoprolol. Today's echocardiogram shows mild mitral regurgitation and normal LV systolic function with LVEF 66%.  Last chest CT for aortic aneurysm 10/22/2021 (4.5 cm).     Would opt for rhythm control strategy for the atrial fibrillation.  He is currently on heparin infusion for thromboembolic prophylaxis ([id number] 1) with no recurrence of epistaxis. I have spoken with the patient at length regarding the risks and benefits to the cardioversion as well as the fact that he will need a prior HENRRY to rule out LA/OBDULIA thrombus. He agrees and has consented.   Plan: NPO after midnight          Non contrast CHEST CT today          HENRRY/DCCV  TOMORROW.          Continue heparin infusion for anticoagulation.  Keep PTT 50-70 to avoid recurrence of epistaxis.            Continue metoprolol for rate control.    I have spoken with patient's daughter Imelda [phone] to update her on the plan.   Discussed with Dr. Morgan.

## 2021-03-01 NOTE — PROGRESS NOTE ADULT - SUBJECTIVE AND OBJECTIVE BOX
[facility] Division of Hospital Medicine  Urban Boyd MD  Pager (M-F, 8A-5P): [phone]  Other Times:  [phone]    Patient is a 75y old  Male who presents with a chief complaint of Epistaxis, Near syncope, New onset atrial fibrillation (01 Mar 2021 11:10)    SUBJECTIVE / OVERNIGHT EVENTS:  Patient upset that he's NPO still.   No F/C, N/V, CP, SOB, Cough, lightheadedness, dizziness, abdominal pain, diarrhea, dysuria.    MEDICATIONS  (STANDING):  bisacodyl 5 milliGRAM(s) Oral at bedtime  heparin  Infusion.  Unit(s)/Hr (15 mL/Hr) IV Continuous <Continuous>  metoprolol succinate ER 50 milliGRAM(s) Oral daily  sodium chloride 0.65% Nasal 1 Spray(s) Both Nostrils two times a day    MEDICATIONS  (PRN):  heparin   Injectable 6500 Unit(s) IV Push every 6 hours PRN For aPTT less than 40  heparin   Injectable 3000 Unit(s) IV Push every 6 hours PRN For aPTT between 40 - 57  oxycodone    5 mG/acetaminophen 325 mG 2 Tablet(s) Oral every 6 hours PRN Moderate Pain (4 - 6)  oxymetazoline 0.05% Nasal Spray 2 Spray(s) Both Nostrils every 12 hours PRN Epistaxis      Vital Signs Last 24 Hrs  T(C): 36.8 (01 Mar 2021 13:04), Max: 36.8 (28 Feb 2021 21:18)  T(F): 98.2 (01 Mar 2021 13:04), Max: 98.2 (28 Feb 2021 21:18)  HR: 85 (01 Mar 2021 13:04) (82 - 87)  BP: 120/81 (01 Mar 2021 13:04) (96/67 - 120/81)  BP(mean): --  RR: 16 (01 Mar 2021 13:04) (16 - 16)  SpO2: 98% (01 Mar 2021 13:04) (97% - 98%)  CAPILLARY BLOOD GLUCOSE        I&O's Summary    28 Feb 2021 07:01  -  01 Mar 2021 07:00  --------------------------------------------------------  IN: 401 mL / OUT: 0 mL / NET: 401 mL        PHYSICAL EXAM:  GENERAL: NAD  HEAD:  Atraumatic, Normocephalic  EYES: L prosthetic eye, Rt PERRLA, conjunctiva and sclera clear  NECK: Supple, No JVD  CHEST/LUNG: Clear to auscultation bilaterally; No wheeze  HEART: Afib; No murmurs, rubs, or gallops  ABDOMEN: Soft, Nontender, Nondistended; Bowel sounds present  EXTREMITIES:  2+ Peripheral Pulses, No clubbing, cyanosis, or edema  PSYCH: Calm  NEUROLOGY: A/Ox3, non-focal  SKIN: No rashes or lesions    LABS:                        13.8   7.85  )-----------( 165      ( 01 Mar 2021 05:25 )             42.1     03-01    138  |  108<H>  |  22  ----------------------------<  87  4.0   |  20<L>  |  0.87    Ca    8.3<L>      01 Mar 2021 05:25  Phos  3.1     03-01  Mg     2.3     03-01      PTT - ( 01 Mar 2021 13:16 )  PTT:123.5 sec          RADIOLOGY & ADDITIONAL TESTS:  < from: Transthoracic Echocardiogram (03.01.21 @ 10:43) >  ------------------------------------------------------------------------  CONCLUSIONS:  1. Mitral annular calcification, otherwise normal mitral  valve. Mild mitral regurgitation.  2. Calcified trileaflet aortic valve with normal opening.  Mild aortic regurgitation.  3. Normal left ventricular internal dimensions and wall  thicknesses.  4. Normal left ventricular systolic function. No segmental  wall motion abnormalities.  5. Normal right ventricular size and function.  ------------------------------------------------------------------------    < end of copied text >    Imaging Personally Reviewed:    Care Discussed with Consultants/Other Providers:

## 2021-03-01 NOTE — CONSULT NOTE ADULT - ATTENDING COMMENTS
Asymptomatic AFib (rate-controlled) in the setting of active epistaxis. [id number] atleast 3.  Plan:  1. TTE in AM.  2. Tele-monitoring for atleast 6 hrs.  3. Pt would benefit from eventual long-term anticoagulation-timing of starting the same to be determined by the primary team, weighing in risk of bleeding.   4. ENT consultation may help.    Thanks for the opportunity of participating in the care of this pt. Please call [phone] for further questions/issues.    Harlan Harris MD  Interventional cardiologist  Coverage information @ [email or web address] ,  pw:  vandana
76 y/o male with PMHx of aortic aneurysm 4.5cm on beta blockers, colon and bladder cancer s/p chemo years ago in remission, left prosthetic eyeball since birth, presents to ED with unprovoked epistaxis associated with lightheadedness and new onset atrial fibrillation of questionable duration, rate controlled.    Hemodynamically stable. His home medications are Percocet and metoprolol. Today's echocardiogram shows mild mitral regurgitation and normal LV systolic function with LVEF 66%.  Last chest CT for aortic aneurysm 10/22/2021 (4.5 cm). Plan for HENRRY/DCCV tomorrow.
- acyclovir, fluconazole, bactrim ppx  - Ethanol locks to unlocked lumen 4 hours/day

## 2021-03-01 NOTE — PROVIDER CONTACT NOTE (OTHER) - ASSESSMENT
Attempted to talk to patient multiple times about importance of drawing APTT; patient verbalized understanding. Pt stated he has to sleep now and he will let staff draw blood at a later time

## 2021-03-01 NOTE — CONSULT NOTE ADULT - REASON FOR ADMISSION
Epistaxis, Near syncope, New onset atrial fibrillation
Epistaxis, Near syncope, New onset atrial fibrillation

## 2021-03-01 NOTE — CONSULT NOTE ADULT - SUBJECTIVE AND OBJECTIVE BOX
74 y/o male with PMHx of aortic aneurysm 4.5cm on beta blockers, colon and bladder cancer s/p chemo years ago in remission, left prosthetic eyeball since birth, presents to ED for unprovoked epistaxis associated with lightheadedness.  Denied chest pain, palpitations, cough, SOB, CURRIE, PND, orthopnea, increased lower extremity edema, fever chills, abdominal pain, N/V/C/[facility] home Pt Home meds: Percocet and Metoprolol. Cardiology called for New AFIB noted in the ED.  Cardiologist Dr. Arnold Ward called and agreed with admission        PAST MEDICAL & SURGICAL HISTORY:  Aortic aneurysm  Lumbar disc disease  Colon cancer s/p microsurgery at [facility]  no chemo/XRT in remission since 2012    Blind left eye since birth w/prosthesis   Hepatitis C treated 7 years ago    Bladder tumor s/p TURBT - multiple procedures ( last procedure 2/2016)  Cystoscopy and bladder Biopsy -  7/2020      ALLERGIES:  NKDA    MEDICATIONS  (STANDING):  bisacodyl 5 milliGRAM(s) Oral at bedtime  heparin  Infusion.  Unit(s)/Hr (15 mL/Hr) IV Continuous <Continuous>  metoprolol succinate ER 50 milliGRAM(s) Oral daily  sodium chloride 0.65% Nasal 1 Spray(s) Both Nostrils two times a day    MEDICATIONS  (PRN):  heparin   Injectable 6500 Unit(s) IV Push every 6 hours PRN For aPTT less than 40  heparin   Injectable 3000 Unit(s) IV Push every 6 hours PRN For aPTT between 40 - 57  oxycodone    5 mG/acetaminophen 325 mG 2 Tablet(s) Oral every 6 hours PRN Moderate Pain (4 - 6)  oxymetazoline 0.05% Nasal Spray 2 Spray(s) Both Nostrils every 12 hours PRN Epistaxis      FAMILY HISTORY:  Family history of early CAD (Father)    Family history of Alzheimer&#x27;s disease (Father)        SOCIAL HISTORY:    CIGARETTES:    ALCOHOL:    REVIEW OF SYSTEMS:    CONSTITUTIONAL: No fever, weight loss, chills, shakes, or fatigue  EYES: No eye pain, visual disturbances, or discharge  ENMT:  No difficulty hearing, tinnitus, vertigo; No sinus or throat pain  NECK: No pain or stiffness  BREASTS: No pain, masses, or nipple discharge  RESPIRATORY: No cough, wheezing, hemoptysis, or shortness of breath  CARDIOVASCULAR: No chest pain, dyspnea, palpitations, dizziness, syncope, paroxysmal nocturnal dyspnea, orthopnea, or arm or leg swelling  GASTROINTESTINAL: No abdominal  or epigastric pain, nausea, vomiting, hematemesis, diarrhea, constipation, melena or bright red blood.  GENITOURINARY: No dysuria, nocturia, hematuria, or urinary incontinence  NEUROLOGICAL: No headaches, memory loss, slurred speech, limb weakness, loss of strength, numbness, or tremors  SKIN: No itching, burning, rashes, or lesions   LYMPH NODES: No enlarged glands  ENDOCRINE: No heat or cold intolerance, or hair loss  MUSCULOSKELETAL: No joint pain or swelling, muscle, back, or extremity pain  PSYCHIATRIC: No depression, anxiety, or difficulty sleeping  HEME/LYMPH: No easy bruising or bleeding gums  ALLERY AND IMMUNOLOGIC: No hives or rash.      Vital Signs Last 24 Hrs  T(C): 36.6 (01 Mar 2021 05:57), Max: 37.2 (28 Feb 2021 13:04)  T(F): 97.9 (01 Mar 2021 05:57), Max: 99 (28 Feb 2021 13:04)  HR: 82 (01 Mar 2021 05:57) (82 - 89)  BP: 101/65 (01 Mar 2021 05:57) (95/65 - 101/65)  BP(mean): --  RR: 16 (01 Mar 2021 05:57) (16 - 18)  SpO2: 98% (01 Mar 2021 05:57) (97% - 98%)    PHYSICAL EXAM:    GENERAL: In no apparent distress, well nourished, and hydrated.  HEAD:  Atraumatic, Normocephalic  EYES: EOMI, PERRLA, conjunctiva and sclera clear  ENMT: No tonsillar erythema, exudates, or enlargement; Moist mucous membranes, Good dentition, No lesions  NECK: Supple and normal thyroid.  No JVD or carotid bruit.  Carotid pulse is 2+ bilaterally.  HEART: Regular rate and rhythm; No murmurs, rubs, or gallops.  PULMONARY: Clear to auscultation and perfusion.  No rales, wheezing, or rhonchi bilaterally.  ABDOMEN: Soft, Nontender, Nondistended; Bowel sounds present  EXTREMITIES:  2+ Peripheral Pulses, No clubbing, cyanosis, or edema  LYMPH: No lymphadenopathy noted  NEUROLOGICAL: Grossly nonfocal          INTERPRETATION OF TELEMETRY:    ECG:    I&O's Detail    28 Feb 2021 07:01  -  01 Mar 2021 07:00  --------------------------------------------------------  IN:    Heparin Infusion: 201 mL    Oral Fluid: 200 mL  Total IN: 401 mL    OUT:  Total OUT: 0 mL    Total NET: 401 mL          LABS:                        13.8   7.85  )-----------( 165      ( 01 Mar 2021 05:25 )             42.1     03-01    138  |  108<H>  |  22  ----------------------------<  87  4.0   |  20<L>  |  0.87    Ca    8.3<L>      01 Mar 2021 05:25  Phos  3.1     03-01  Mg     2.3     03-01          PTT - ( 01 Mar 2021 05:25 )  PTT:>200.0 sec    BNP  I&O's Detail    28 Feb 2021 07:01  -  01 Mar 2021 07:00  --------------------------------------------------------  IN:    Heparin Infusion: 201 mL    Oral Fluid: 200 mL  Total IN: 401 mL    OUT:  Total OUT: 0 mL    Total NET: 401 mL        Daily     Daily     RADIOLOGY & ADDITIONAL STUDIES:  PREVIOUS DIAGNOSTIC TESTING:      ECHO  FINDINGS:    STRESS  FINDINGS:    CATHETERIZATION  FINDINGS:      ASSESSMENT:        RECOMMENDATIONS: 74 y/o male with PMHx of aortic aneurysm 4.5cm on beta blockers, colon and bladder cancer s/p chemo years ago in remission, left prosthetic eyeball since birth, presents to ED with unprovoked epistaxis associated with lightheadedness. Also found to be in new onset atrial fibrillation, rate controlled, asymptomatic and hemodynamically stable.   Denied chest pain, palpitations, cough, SOB, CURRIE, PND, orthopnea, increased lower extremity edema, fever chills or abdominal pain. No prior history of an arrhythmia. Pt Home meds: Percocet and Metoprolol.   Cardiologist Dr. Arnold Ward called and agreed with admission        PAST MEDICAL & SURGICAL HISTORY:  Aortic aneurysm  Lumbar disc disease  Colon cancer s/p microsurgery at [facility]  no chemo/XRT in remission since 2012    Blind left eye since birth w/prosthesis   Hepatitis C treated 7 years ago    Bladder tumor s/p TURBT - multiple procedures ( last procedure 2/2016)  Cystoscopy and bladder Biopsy -  7/2020      ALLERGIES:  NKDA    MEDICATIONS  (STANDING):  bisacodyl 5 milliGRAM(s) Oral at bedtime  heparin  Infusion.  Unit(s)/Hr (15 mL/Hr) IV Continuous <Continuous>  metoprolol succinate ER 50 milliGRAM(s) Oral daily  sodium chloride 0.65% Nasal 1 Spray(s) Both Nostrils two times a day    MEDICATIONS  (PRN):  heparin   Injectable 6500 Unit(s) IV Push every 6 hours PRN For aPTT less than 40  heparin   Injectable 3000 Unit(s) IV Push every 6 hours PRN For aPTT between 40 - 57  oxycodone    5 mG/acetaminophen 325 mG 2 Tablet(s) Oral every 6 hours PRN Moderate Pain (4 - 6)  oxymetazoline 0.05% Nasal Spray 2 Spray(s) Both Nostrils every 12 hours PRN Epistaxis      FAMILY HISTORY:  Family history of early CAD (Father)    Family history of Alzheimer&#x27;s disease (Father)        SOCIAL HISTORY:    CIGARETTES: no  DRUGS: no  ALCOHOL: no    REVIEW OF SYSTEMS:    CONSTITUTIONAL: No fever, weight loss, chills, shakes, or fatigue  EYES: No eye pain, visual disturbances, or discharge  ENMT:  No difficulty hearing, tinnitus, vertigo; No sinus or throat pain  NECK: No pain or stiffness  BREASTS: No pain, masses, or nipple discharge  RESPIRATORY: No cough, wheezing, hemoptysis, or shortness of breath  CARDIOVASCULAR: No chest pain, dyspnea, palpitations, dizziness, syncope, paroxysmal nocturnal dyspnea, orthopnea, or arm or leg swelling  GASTROINTESTINAL: No abdominal  or epigastric pain, nausea, vomiting, hematemesis, diarrhea, constipation, melena or bright red blood.  GENITOURINARY: No dysuria, nocturia, hematuria, or urinary incontinence  NEUROLOGICAL: No headaches, memory loss, slurred speech, limb weakness, loss of strength, numbness, or tremors  SKIN: No itching, burning, rashes, or lesions   LYMPH NODES: No enlarged glands  ENDOCRINE: No heat or cold intolerance, or hair loss  MUSCULOSKELETAL: No joint pain or swelling, muscle, back, or extremity pain  PSYCHIATRIC: No depression, anxiety, or difficulty sleeping  HEME/LYMPH: No easy bruising or bleeding gums  ALLERY AND IMMUNOLOGIC: No hives or rash.      Vital Signs Last 24 Hrs  T(C): 36.6 (01 Mar 2021 05:57), Max: 37.2 (28 Feb 2021 13:04)  T(F): 97.9 (01 Mar 2021 05:57), Max: 99 (28 Feb 2021 13:04)  HR: 82 (01 Mar 2021 05:57) (82 - 89)  BP: 101/65 (01 Mar 2021 05:57) (95/65 - 101/65)  BP(mean): --  RR: 16 (01 Mar 2021 05:57) (16 - 18)  SpO2: 98% (01 Mar 2021 05:57) (97% - 98%)    PHYSICAL EXAM:    GENERAL: In no apparent distress, well nourished, and hydrated.  HEAD:  Atraumatic, Normocephalic  EYES: EOMI, PERRLA, conjunctiva and sclera clear. Artificial left eye  ENMT: No tonsillar erythema, exudates, or enlargement; Dry mucous membranes, Good dentition  NECK: Supple and normal thyroid.  No JVD or carotid bruit.  Carotid pulse is 2+ bilaterally.  HEART: Irregular rhythm rate normal. No murmurs, rubs, or gallops.  PULMONARY: Clear to auscultation and perfusion.  No rales, wheezing, or rhonchi bilaterally.  ABDOMEN: Soft, Nontender, Nondistended; Bowel sounds present  EXTREMITIES:  2+ Peripheral Pulses, No clubbing, cyanosis, or edema  LYMPH: No lymphadenopathy noted  NEUROLOGICAL: Grossly nonfocal          INTERPRETATION OF TELEMETRY: Atrial fibrillation with ventricular rates 70-90's.     ECG: Atrial fibrillation 79 bpm. Narrow QRS.           28 Feb 2021 07:01  -  01 Mar 2021 07:00  LABS:                        13.8   7.85  )-----------( 165      ( 01 Mar 2021 05:25 )             42.1     03-01    138  |  108<H>  |  22  ----------------------------<  87  4.0   |  20<L>  |  0.87    Ca    8.3<L>      01 Mar 2021 05:25  Phos  3.1     03-01  Mg     2.3     03-01    PTT - ( 01 Mar 2021 05:25 )  PTT:>200.0 sec  BNP  I&O's Detail          RADIOLOGY & ADDITIONAL STUDIES:  PREVIOUS DIAGNOSTIC TESTING:      ECHO  FINDINGS:    < from: Transthoracic Echocardiogram (03.01.21 @ 10:43) >  Patient name: TAJ HUBBARD  YOB: 1945   Age: 75 (M)   MR#: [id number]  Study Date: 3/1/2021  [facility]  Study quality: Technically good  Referring Physician: Radha Dangelo MD  Blood Pressure: 107/65mmHg  Height: 172 cm  Weight: 85 kg  BSA: 2 m2  ------------------------------------------------------------------------  PROCEDURE: Transthoracic echocardiogram with 2-D, M-Mode  and complete spectral and color flow Doppler.  INDICATION: Unspecifiedatrial fibrillation (I48.91),  Syncope and collapse (R55)  ------------------------------------------------------------------------  DIMENSIONS:  Dimensions:     Normal Values:  LA:     3.6 cm    2.0 - 4.0 cm  Ao:     3.6 cm    2.0 - 3.8 cm  SEPTUM: 0.6 cm    0.6 - 1.2 cm  PWT:    0.8 cm    0.6 - 1.1 cm  LVIDd:  4.2 cm    3.0 - 5.6 cm  LVIDs:  2.7 cm    1.8 - 4.0 cm  Derived Variables:  LVMI: 43 g/m2  RWT: 0.38  Fractional short: 36 %  Ejection Fraction (Teicholtz): 66 %  ------------------------------------------------------------------------  OBSERVATIONS:  Mitral Valve: Mitral annular calcification, otherwise  normal mitral valve. Mild mitral regurgitation.  Aortic Root: Normal aortic root.  Aortic Valve: Calcified trileaflet aortic valve with normal  opening. Mild aortic regurgitation.  Left Atrium: Normal left atrium.  LA volume index = 29  cc/m2.  Left Ventricle: Normal left ventricular systolic function.  No segmental wall motion abnormalities. Normal left  ventricular internal dimensions and wall thicknesses.  Right Heart: Normal right atrium. Normal right ventricular  size and function. Normal tricuspid valve.  Mild tricuspid  regurgitation. Normal pulmonic valve. Minimal pulmonic  regurgitation.  Pericardium/PleuraNormal pericardium with no pericardial  effusion.  ------------------------------------------------------------------------  CONCLUSIONS:  1. Mitral annular calcification, otherwise normal mitral  valve. Mild mitral regurgitation.  2. Calcified trileaflet aortic valve with normal opening.  Mild aortic regurgitation.  3. Normal left ventricular internal dimensions and wall  thicknesses.  4. Normal left ventricular systolic function. No segmental  wall motion abnormalities.  5. Normal right ventricular size and function.  ------------------------------------------------------------------------  Confirmed on  3/1/2021 - 12:57:45 by Guillermo Paulino M.D.,  [facility]  ------------------------------------------------------------------------    < end of copied text >             76 y/o male with PMHx of aortic aneurysm 4.5cm on beta blockers, colon and bladder cancer s/p chemo years ago, in remission, left prosthetic eyeball since birth, presents to ED with unprovoked epistaxis associated with lightheadedness. Also found to be in new onset atrial fibrillation, rate controlled, asymptomatic and hemodynamically stable.   Denied chest pain, palpitations, cough, SOB, CURRIE, PND, orthopnea, increased lower extremity edema, fever chills or abdominal pain. No prior history of an arrhythmia. Pt Home meds: Percocet and Metoprolol.   Cardiologist Dr. Arnold Ward called and agreed with admission  Last chest CT 10/22/21: aortic aneurysm 4.5 cm.   Patient COVID -19 PCR negative.       PAST MEDICAL & SURGICAL HISTORY:  Aortic aneurysm  Lumbar disc disease  Colon cancer s/p microsurgery at [facility]  no chemo/XRT in remission since 2012    Blind left eye since birth w/prosthesis   Hepatitis C treated 7 years ago    Bladder tumor s/p TURBT - multiple procedures ( last procedure 2/2016)  Cystoscopy and bladder Biopsy -  7/2020      ALLERGIES:  NKDA    MEDICATIONS  (STANDING):  bisacodyl 5 milliGRAM(s) Oral at bedtime  heparin  Infusion.  Unit(s)/Hr (15 mL/Hr) IV Continuous <Continuous>  metoprolol succinate ER 50 milliGRAM(s) Oral daily  sodium chloride 0.65% Nasal 1 Spray(s) Both Nostrils two times a day    MEDICATIONS  (PRN):  heparin   Injectable 6500 Unit(s) IV Push every 6 hours PRN For aPTT less than 40  heparin   Injectable 3000 Unit(s) IV Push every 6 hours PRN For aPTT between 40 - 57  oxycodone    5 mG/acetaminophen 325 mG 2 Tablet(s) Oral every 6 hours PRN Moderate Pain (4 - 6)  oxymetazoline 0.05% Nasal Spray 2 Spray(s) Both Nostrils every 12 hours PRN Epistaxis      FAMILY HISTORY:  Family history of early CAD (Father)    Family history of Alzheimer;s disease (Father)        SOCIAL HISTORY:    CIGARETTES: no  DRUGS: no  ALCOHOL: no    REVIEW OF SYSTEMS:    CONSTITUTIONAL: No fever, weight loss, chills, shakes, or fatigue  EYES: No eye pain, left eye prothesis since childhood.   ENMT:  No difficulty hearing, tinnitus, vertigo; Non provoked epistaxis now resolved  NECK: No pain or stiffness  BREASTS: No pain, masses, or nipple discharge  RESPIRATORY: No cough, wheezing, hemoptysis, or shortness of breath  CARDIOVASCULAR: No chest pain, dyspnea, palpitations, dizziness, syncope, paroxysmal nocturnal dyspnea, orthopnea, or arm or leg swelling  GASTROINTESTINAL: No abdominal  or epigastric pain, nausea, vomiting, hematemesis, diarrhea, constipation, melena or bright red blood. Prior surgery.   GENITOURINARY: No dysuria, nocturia, hematuria, or urinary incontinence  NEUROLOGICAL: No headaches, memory loss, slurred speech, limb weakness, loss of strength, numbness, or tremors  SKIN: No itching, burning, rashes, or lesions   LYMPH NODES: No enlarged glands  ENDOCRINE: No heat or cold intolerance, or hair loss  MUSCULOSKELETAL: No joint pain or swelling, muscle, back, or extremity pain  PSYCHIATRIC: No depression, anxiety, or difficulty  Very anxious  HEME/LYMPH: No easy bruising or bleeding gums  ALLERGY AND IMMUNOLOGIC: No hives or rash.      Vital Signs Last 24 Hrs  T(C): 36.6 (01 Mar 2021 05:57), Max: 37.2 (28 Feb 2021 13:04)  T(F): 97.9 (01 Mar 2021 05:57), Max: 99 (28 Feb 2021 13:04)  HR: 82 (01 Mar 2021 05:57) (82 - 89)  BP: 101/65 (01 Mar 2021 05:57) (95/65 - 101/65)  BP(mean): --  RR: 16 (01 Mar 2021 05:57) (16 - 18)  SpO2: 98% (01 Mar 2021 05:57) (97% - 98%)    PHYSICAL EXAM:    GENERAL: In no apparent distress, well nourished, and hydrated.  HEAD:  Atraumatic, Normocephalic  EYES: EOMI, PERRLA, conjunctiva and sclera clear. Artificial left eye  ENMT: No tonsillar erythema, exudates, or enlargement; Dry mucous membranes, Good dentition  NECK: Supple and normal thyroid.  No JVD or carotid bruit.  Carotid pulse is 2+ bilaterally.  HEART: Irregular rhythm rate normal. No murmurs, rubs, or gallops.  PULMONARY: Clear to auscultation and perfusion.  No rales, wheezing, or rhonchi bilaterally.  ABDOMEN: Soft, Nontender, Nondistended; Bowel sounds present  EXTREMITIES:  2+ Peripheral Pulses, No clubbing, cyanosis, or edema  LYMPH: No lymphadenopathy noted  NEUROLOGICAL: Grossly nonfocal          INTERPRETATION OF TELEMETRY: Atrial fibrillation with ventricular rates 70-90's.     ECG: Atrial fibrillation 79 bpm. Narrow QRS.           28 Feb 2021 07:01  -  01 Mar 2021 07:00  LABS:                        13.8   7.85  )-----------( 165      ( 01 Mar 2021 05:25 )             42.1     03-01    138  |  108<H>  |  22  ----------------------------<  87  4.0   |  20<L>  |  0.87    Ca    8.3<L>      01 Mar 2021 05:25  Phos  3.1     03-01  Mg     2.3     03-01    PTT - ( 01 Mar 2021 05:25 )  PTT:>200.0 sec      RADIOLOGY & ADDITIONAL STUDIES:  PREVIOUS DIAGNOSTIC TESTING:      ECHO  FINDINGS:    < from: Transthoracic Echocardiogram (03.01.21 @ 10:43) >  Patient name: TAJ HUBBARD  YOB: 1945   Age: 75 (M)   MR#: [id number]  Study Date: 3/1/2021  [facility]  Study quality: Technically good  Referring Physician: Radha Dangelo MD  Blood Pressure: 107/65mmHg  Height: 172 cm  Weight: 85 kg  BSA: 2 m2  ------------------------------------------------------------------------  PROCEDURE: Transthoracic echocardiogram with 2-D, M-Mode  and complete spectral and color flow Doppler.  INDICATION: Unspecifiedatrial fibrillation (I48.91),  Syncope and collapse (R55)  ------------------------------------------------------------------------  DIMENSIONS:  Dimensions:     Normal Values:  LA:     3.6 cm    2.0 - 4.0 cm  Ao:     3.6 cm    2.0 - 3.8 cm  SEPTUM: 0.6 cm    0.6 - 1.2 cm  PWT:    0.8 cm    0.6 - 1.1 cm  LVIDd:  4.2 cm    3.0 - 5.6 cm  LVIDs:  2.7 cm    1.8 - 4.0 cm  Derived Variables:  LVMI: 43 g/m2  RWT: 0.38  Fractional short: 36 %  Ejection Fraction (Teicholtz): 66 %  ------------------------------------------------------------------------  OBSERVATIONS:  Mitral Valve: Mitral annular calcification, otherwise  normal mitral valve. Mild mitral regurgitation.  Aortic Root: Normal aortic root.  Aortic Valve: Calcified trileaflet aortic valve with normal  opening. Mild aortic regurgitation.  Left Atrium: Normal left atrium.  LA volume index = 29  cc/m2.  Left Ventricle: Normal left ventricular systolic function.  No segmental wall motion abnormalities. Normal left  ventricular internal dimensions and wall thicknesses.  Right Heart: Normal right atrium. Normal right ventricular  size and function. Normal tricuspid valve.  Mild tricuspid  regurgitation. Normal pulmonic valve. Minimal pulmonic  regurgitation.  Pericardium/PleuraNormal pericardium with no pericardial  effusion.  ------------------------------------------------------------------------  CONCLUSIONS:  1. Mitral annular calcification, otherwise normal mitral  valve. Mild mitral regurgitation.  2. Calcified trileaflet aortic valve with normal opening.  Mild aortic regurgitation.  3. Normal left ventricular internal dimensions and wall  thicknesses.  4. Normal left ventricular systolic function. No segmental  wall motion abnormalities.  5. Normal right ventricular size and function.  ------------------------------------------------------------------------  Confirmed on  3/1/2021 - 12:57:45 by Guillermo Paulino M.D.,  [facility], [facility]  ------------------------------------------------------------------------      ABDOMINAL  CT SCAN:   < from: CT Angio Abdomen and Pelvis w/ IV Cont (02.26.21 @ 19:31) >  EXAM:  CT ANGIO ABD PELV (W)AW IC        PROCEDURE DATE:  Feb 26 2021     INTERPRETATION:  CLINICAL INFORMATION: Dizziness, history of AAA, new onset atrial fibrillation    COMPARISON: CT abdomen and pelvis 7/14/2020    PROCEDURE:  CT Angiography of the Abdomen and Pelvis.  Arterial phase images were acquired.  Intravenous contrast: 90 ml Omnipaque 350. 10 ml discarded.  Oral contrast: None.  Sagittal and coronal reformats were performed as well as 3D (MIP) reconstructions.    FINDINGS:  LOWER CHEST: Trace bibasilar dependent atelectasis.    LIVER: Within normal limits.  BILE DUCTS: Normal caliber.  GALLBLADDER: Within normal limits.  SPLEEN: Within normal limits.  PANCREAS: Within normal limits.  ADRENALS: Within normal limits.  KIDNEYS/URETERS: No hydronephrosis. Symmetric enhancement. Left renal cyst.    BLADDER: Within normal limits.  REPRODUCTIVE ORGANS: Normal sized prostate.    BOWEL: No bowel obstruction. Appendix is normal.  PERITONEUM: No ascites.  VESSELS: Atheroscleroticchanges. No abdominal aortic aneurysm. A 1 cm distal left renal artery aneurysm is unchanged. A 1.7 x 1 cm distal right renal artery aneurysm is unchanged.  RETROPERITONEUM/LYMPH NODES: No lymphadenopathy.  ABDOMINAL WALL: Within normal limits.  BONES: Degenerative changes.    IMPRESSION:  No acute intra-abdominal pathology. No evidence of abdominal aortic aneurysm.  Renal artery aneurysms are stable.    MANJIT AGUAYO MD; Resident Radiology  This document has been electronically signed.  WALTER JOSEPH MD; Attending Radiologist  This document has been electronically signed. Feb 26 2021  8:46PM          < end of copied text >

## 2021-03-02 ENCOUNTER — TRANSCRIPTION ENCOUNTER (OUTPATIENT)
Age: 76
End: 2021-03-02

## 2021-03-02 VITALS
TEMPERATURE: 98 F | RESPIRATION RATE: 18 BRPM | HEART RATE: 65 BPM | OXYGEN SATURATION: 98 % | DIASTOLIC BLOOD PRESSURE: 72 MMHG | SYSTOLIC BLOOD PRESSURE: 111 MMHG

## 2021-03-02 LAB
ANION GAP SERPL CALC-SCNC: 11 MMOL/L — SIGNIFICANT CHANGE UP (ref 7–14)
APTT BLD: 60.1 SEC — HIGH (ref 27–36.3)
BUN SERPL-MCNC: 16 MG/DL — SIGNIFICANT CHANGE UP (ref 7–23)
CALCIUM SERPL-MCNC: 8.5 MG/DL — SIGNIFICANT CHANGE UP (ref 8.4–10.5)
CHLORIDE SERPL-SCNC: 107 MMOL/L — SIGNIFICANT CHANGE UP (ref 98–107)
CO2 SERPL-SCNC: 22 MMOL/L — SIGNIFICANT CHANGE UP (ref 22–31)
CREAT SERPL-MCNC: 0.84 MG/DL — SIGNIFICANT CHANGE UP (ref 0.5–1.3)
GLUCOSE SERPL-MCNC: 93 MG/DL — SIGNIFICANT CHANGE UP (ref 70–99)
HCT VFR BLD CALC: 43.6 % — SIGNIFICANT CHANGE UP (ref 39–50)
HGB BLD-MCNC: 14.6 G/DL — SIGNIFICANT CHANGE UP (ref 13–17)
MAGNESIUM SERPL-MCNC: 2.3 MG/DL — SIGNIFICANT CHANGE UP (ref 1.6–2.6)
MCHC RBC-ENTMCNC: 33.5 GM/DL — SIGNIFICANT CHANGE UP (ref 32–36)
MCHC RBC-ENTMCNC: 34.1 PG — HIGH (ref 27–34)
MCV RBC AUTO: 101.9 FL — HIGH (ref 80–100)
NRBC # BLD: 0 /100 WBCS — SIGNIFICANT CHANGE UP
NRBC # FLD: 0 K/UL — SIGNIFICANT CHANGE UP
PHOSPHATE SERPL-MCNC: 2.9 MG/DL — SIGNIFICANT CHANGE UP (ref 2.5–4.5)
PLATELET # BLD AUTO: 169 K/UL — SIGNIFICANT CHANGE UP (ref 150–400)
POTASSIUM SERPL-MCNC: 4 MMOL/L — SIGNIFICANT CHANGE UP (ref 3.5–5.3)
POTASSIUM SERPL-SCNC: 4 MMOL/L — SIGNIFICANT CHANGE UP (ref 3.5–5.3)
RBC # BLD: 4.28 M/UL — SIGNIFICANT CHANGE UP (ref 4.2–5.8)
RBC # FLD: 11.9 % — SIGNIFICANT CHANGE UP (ref 10.3–14.5)
SODIUM SERPL-SCNC: 140 MMOL/L — SIGNIFICANT CHANGE UP (ref 135–145)
WBC # BLD: 7.54 K/UL — SIGNIFICANT CHANGE UP (ref 3.8–10.5)
WBC # FLD AUTO: 7.54 K/UL — SIGNIFICANT CHANGE UP (ref 3.8–10.5)

## 2021-03-02 PROCEDURE — 93320 DOPPLER ECHO COMPLETE: CPT | Mod: 26,GC

## 2021-03-02 PROCEDURE — 76376 3D RENDER W/INTRP POSTPROCES: CPT | Mod: 26

## 2021-03-02 PROCEDURE — 92960 CARDIOVERSION ELECTRIC EXT: CPT

## 2021-03-02 PROCEDURE — 99233 SBSQ HOSP IP/OBS HIGH 50: CPT

## 2021-03-02 PROCEDURE — 93312 ECHO TRANSESOPHAGEAL: CPT | Mod: 26

## 2021-03-02 PROCEDURE — 93325 DOPPLER ECHO COLOR FLOW MAPG: CPT | Mod: 26,GC

## 2021-03-02 RX ORDER — APIXABAN 2.5 MG/1
5 TABLET, FILM COATED ORAL EVERY 12 HOURS
Refills: 0 | Status: DISCONTINUED | OUTPATIENT
Start: 2021-03-02 | End: 2021-03-02

## 2021-03-02 RX ORDER — METOPROLOL TARTRATE 50 MG
0 TABLET ORAL
Qty: 0 | Refills: 0 | DISCHARGE

## 2021-03-02 RX ORDER — SODIUM CHLORIDE 0.65 %
1 AEROSOL, SPRAY (ML) NASAL
Qty: 0 | Refills: 0 | DISCHARGE
Start: 2021-03-02

## 2021-03-02 RX ORDER — APIXABAN 2.5 MG/1
1 TABLET, FILM COATED ORAL
Qty: 60 | Refills: 0
Start: 2021-03-02 | End: 2021-03-31

## 2021-03-02 RX ORDER — METOPROLOL TARTRATE 50 MG
1 TABLET ORAL
Qty: 30 | Refills: 0
Start: 2021-03-02 | End: 2021-03-31

## 2021-03-02 RX ADMIN — Medication 1 SPRAY(S): at 05:03

## 2021-03-02 RX ADMIN — OXYCODONE AND ACETAMINOPHEN 2 TABLET(S): 5; 325 TABLET ORAL at 13:29

## 2021-03-02 RX ADMIN — HEPARIN SODIUM 1200 UNIT(S)/HR: 5000 INJECTION INTRAVENOUS; SUBCUTANEOUS at 04:25

## 2021-03-02 RX ADMIN — Medication 50 MILLIGRAM(S): at 13:15

## 2021-03-02 RX ADMIN — APIXABAN 5 MILLIGRAM(S): 2.5 TABLET, FILM COATED ORAL at 12:28

## 2021-03-02 RX ADMIN — APIXABAN 5 MILLIGRAM(S): 2.5 TABLET, FILM COATED ORAL at 17:59

## 2021-03-02 RX ADMIN — Medication 1 SPRAY(S): at 17:59

## 2021-03-02 NOTE — PROGRESS NOTE ADULT - SUBJECTIVE AND OBJECTIVE BOX
DATE OF SERVICE: 03-02-21 @ 23:29    Patient is a 75y old  Male who presents with a chief complaint of Epistaxis, Near syncope, New onset atrial fibrillation (02 Mar 2021 14:13)      INTERVAL HISTORY:     TELEMETRY Personally reviewed:  	  MEDICATIONS:  metoprolol succinate ER 50 milliGRAM(s) Oral daily        PHYSICAL EXAM:  T(C): 36.6 (03-02-21 @ 13:14), Max: 36.8 (03-02-21 @ 05:03)  HR: 65 (03-02-21 @ 13:14) (65 - 87)  BP: 111/72 (03-02-21 @ 13:14) (111/72 - 112/77)  RR: 18 (03-02-21 @ 13:14) (18 - 18)  SpO2: 98% (03-02-21 @ 13:14) (98% - 98%)  Wt(kg): --  I&O's Summary        Appearance: In no distress	  HEENT:    PERRL, EOMI	  Cardiovascular:  S1 S2, No JVD  Respiratory: Lungs clear to auscultation	  Gastrointestinal:  Soft, Non-tender, + BS	  Vascularature:  No edema of LE  Psychiatric: Appropriate affect   Neuro: no acute focal deficits                               14.6   7.54  )-----------( 169      ( 02 Mar 2021 03:49 )             43.6     03-02    140  |  107  |  16  ----------------------------<  93  4.0   |  22  |  0.84    Ca    8.5      02 Mar 2021 03:49  Phos  2.9     03-02  Mg     2.3     03-02          Labs personally reviewed      Assessment and Plan:   · Assessment	  74 y/o male with PMHx of aortic aneurysm 4.5cm on beta blockers, colon and bladder cancer s/p chemo years ago in remission, left prosthetic eyeball since birth, presents to ED for epistaxis Pt is being admitted for Near Syncope 2/2 likely new Afib and Epistaxis.    Problem/Plan - 1:  ·  Problem: New onset atrial fibrillation.  Plan: - CHAD2-VASC: 4   - TSH wnl   - ELiquis  - continue rate control w/ Metoprolol   - EP Dr Morgan consulted for DCCV  - s/p DCCV    Problem/Plan - 2:  ·  Problem: Aortic aneurysm without rupture, unspecified portion of aorta.  Plan: - h/o Aortic Aneurysm 4.5cm  - At home Pt is on BB  - CTA Abdomen and Pelvis: No acute intra-abdominal pathology. No evidence of abdominal aortic aneurysm.  Renal artery aneurysms are stable.               Todd Arellano DO [facility]  Cardiovascular Medicine  [address], [address]  Office: [phone]  Cell: [phone]            Todd Arellano DO [facility]  Cardiovascular Medicine  [address]  Office: [phone]  Cell: [phone]

## 2021-03-02 NOTE — PROGRESS NOTE ADULT - PROBLEM SELECTOR PROBLEM 2
New onset atrial fibrillation
New onset atrial fibrillation
Aortic aneurysm without rupture, unspecified portion of aorta
Aortic aneurysm without rupture, unspecified portion of aorta

## 2021-03-02 NOTE — PROGRESS NOTE ADULT - PROBLEM SELECTOR PLAN 1
- Possibly associated with dry air, smoking  - Epistaxis resolved after applying pressure in ED  - case discussed with ENT- rec starting AC and re-consult if nose bleed recurs   - monitor  - Nasal Spray (Ocean) to help humidify nasal passages
- CHAD2-VASC: 4   - on hep gtt while inpatient due to initial epistaxis - since resolved.  - continue rate control w/ Metoprolol   - TTE reviewed  - EP consult - plan for HENRRY/DCCV on 3/2.
- Possibly associated with dry air, smoking.   - Epistaxis resolved after applying pressure in ED. No bleeding since admission   - case discussed with ENT- rec starting AC and re-consult if nose bleed recurs   - monitor  - Nasal Spray (Ocean) to help humidify nasal passages
- CHAD2-VASC: 4   - on hep gtt while inpatient due to initial epistaxis - since resolved.  - continue rate control w/ Metoprolol   - TTE reviewed  - EP consult - plan for HENRRY/DCCV on 3/2.

## 2021-03-02 NOTE — PROGRESS NOTE ADULT - PROBLEM SELECTOR PROBLEM 4
Aortic aneurysm without rupture, unspecified portion of aorta
Macrocytosis without anemia
Aortic aneurysm without rupture, unspecified portion of aorta
Macrocytosis without anemia

## 2021-03-02 NOTE — PROGRESS NOTE ADULT - REASON FOR ADMISSION
Epistaxis, Near syncope, New onset atrial fibrillation

## 2021-03-02 NOTE — PROGRESS NOTE ADULT - ASSESSMENT
75M Aortic aneurysm (4.5cm), Colon/Bladder CA s/p chemo, Lt prosthetic eye p/w syncope from Afib w/ RVR c/b epistaxis.
75M Aortic aneurysm (4.5cm), Colon/Bladder CA s/p chemo, Lt prosthetic eye p/w syncope from Afib w/ RVR c/b epistaxis.
76 y/o male with PMHx of aortic aneurysm 4.5cm on beta blockers, colon and bladder cancer s/p chemo years ago in remission, left prosthetic eyeball since birth, presents to ED for epistaxis Pt is being admitted for Near Syncope 2/2 likely new Afib and Epistaxis.
76 y/o male with PMHx of aortic aneurysm 4.5cm on beta blockers, colon and bladder cancer s/p chemo years ago in remission, left prosthetic eyeball since birth, presents to ED for epistaxis Pt is being admitted for Near Syncope 2/2 likely new Afib and Epistaxis.

## 2021-03-02 NOTE — PROGRESS NOTE ADULT - PROBLEM SELECTOR PLAN 3
- Possibly associated with dry air, smoking.   - Epistaxis resolved after applying pressure in ED. No bleeding since admission   - case discussed with ENT- rec starting AC and re-consult if nose bleed recurs   - monitor  - Nasal Spray (Ocean) to help humidify nasal passages
- Possibly associated with dry air, smoking.   - Epistaxis resolved after applying pressure in ED. No bleeding since admission   - case discussed with ENT- rec starting AC and re-consult if nose bleed recurs   - monitor  - Nasal Spray (Ocean) to help humidify nasal passages
- MCV = 105.5 (Hgb = 15.1)  - Will get Vit B12, Folate levels
- MCV = 105.5 (Hgb = 15.1)  - Will get Vit B12, Folate levels

## 2021-03-02 NOTE — DISCHARGE NOTE NURSING/CASE MANAGEMENT/SOCIAL WORK - PATIENT PORTAL LINK FT
You can access the FollowMyHealth Patient Portal offered by [facility] by registering at the following website: [email or web address]. By joining [employer]’s FollowMyHealth portal, you will also be able to view your health information using other applications (apps) compatible with our system.

## 2021-03-02 NOTE — DISCHARGE NOTE PROVIDER - NSDCMRMEDTOKEN_GEN_ALL_CORE_FT
METOPROLOL SUCCINATE ER  50 MG TB24:   Percocet 10/325 oral tablet: 1 tab(s) orally every 6 hours   apixaban 5 mg oral tablet: 1 tab(s) orally every 12 hours  bisacodyl 5 mg oral delayed release tablet: 1 tab(s) orally once a day (at bedtime)  metoprolol succinate 50 mg oral tablet, extended release: 1 tab(s) orally once a day  Percocet 10/325 oral tablet: 1 tab(s) orally every 6 hours  sodium chloride 0.65% nasal spray: 1 spray(s) nasal 2 times a day

## 2021-03-02 NOTE — DISCHARGE NOTE PROVIDER - NSDCCPCAREPLAN_GEN_ALL_CORE_FT
PRINCIPAL DISCHARGE DIAGNOSIS  Diagnosis: Bleeding nose  Assessment and Plan of Treatment: - Epistaxis resolved after applying pressure in ED  -your blood levels remained stable    - may use Nasal Spray (Ocean). as needed   -if your nose starts bleeding again and is uncontrolled with applying pressure seek immediate medical care         SECONDARY DISCHARGE DIAGNOSES  Diagnosis: Lumbar degenerative disc disease  Assessment and Plan of Treatment:    - chronic back pain  -takes Percocet 10/325 PO every 6 hours   follow up with your PCP and or orthopedic doctor   do not Do not drive while taking pain medications.   while taking pain medications and or make important decisions    Diagnosis: H/O aortic aneurysm  Assessment and Plan of Treatment: - history of  Aortic Aneurysm 4.5cm  - CTA Abdomen and Pelvis: IMPRESSION:  No acute intra-abdominal pathology. No evidence of abdominal aortic aneurysm. Renal artery aneurysms are stable.     -ct chest - No enlarged axillary, mediastinal or hilar lymph nodes. No pericardial effusion. Vascular calcifications with involvement of the aorta and the coronary arteries. Left atrium measures enlarged measuring about 4.6 cm in anteroposterior dimension. Aortic root calcification. No pleural effusions. Aortic root at the sinuses of Valsalva measures about 3.8 cm as measured on the sagittal oblique computer-generated reconstructed views without significant interval change in appearance since October 22, 2020 given lack of intravenous contrast. Ascending aorta at the level of the main pulmonary artery measures about 4.1 x 4 cm and the descending thoracic aorta measures about 3.1 cm unchanged. No pleural effusions. Evaluation of the upper abdomen demonstrate partially imaged left renal cyst. For complete evaluation of the renal artery aneurysms, please refer to the dedicated CT angiogram of February 26, 2021. Evaluation of the lungs demonstrate linear or subsegmental atelectasis within the right middle lobe. 2 mm left upper lobe pulmonary nodule on image 51 of series 2 is unchanged. No pneumonia. No central endobronchial lesions. Degenerative changes of the spine.  IMPRESSION: Thoracic aortic measurements as above appear unchanged since October 22, 2020. 2 mm left upper lobe pulmonary nodule is unchanged since October 22, 2020. 1 year follow-up noncontrast chest CT can be performed to ensure stability as clinically warranted.  follow up with your PCP and or cardiologist call for appointment       Diagnosis: Near syncope  Assessment and Plan of Treatment: - echo  - ef 66%   1. Mitral annular calcification, otherwise normal mitral  valve. Mild mitral regurgitation.  2. Calcified trileaflet aortic valve with normal opening.  Mild aortic regurgitation.  3. Normal left ventricular internal dimensions and wall  thicknesses.  4. Normal left ventricular systolic function. No segmental  wall motion abnormalities.  5. Normal right ventricular size and function.  follow up with your cardiologist call for appoitnment       Diagnosis: Atrial fibrillation, unspecified type  Assessment and Plan of Treatment: - continue rate control with Metoprolol   -heparin drip started while in the hospital and transitioned to eliquis for discharge   -s/p successful DCCV. Stopped heparin drip and started on Eliquis 5mg BID   -LYNDA and DCCV -3/2 lynda - Mitral annular calcification,  Moderate mitral regurgitation.  Vena contracta width about  0.4 cm. Calcified trileaflet aortic valve  Minimal aortic regurgitation. Mild non-mobile atherosclerotic  plaque in the aortic arch and descending thoracic aorta. evidence of a  patent foramen ovale   monitor for signs of bleeing now that eliquis was started   take your medications as perscribed  if you develop a rapid heart rate seek immediate medical attention  f/u - with your Cardiologist Dr. Arnold Ward call for appointment     PRINCIPAL DISCHARGE DIAGNOSIS  Diagnosis: Bleeding nose  Assessment and Plan of Treatment: - Epistaxis resolved after applying pressure in ED  -your blood levels remained stable    - may use Nasal Spray (Ocean). as needed   -if your nose starts bleeding again and is uncontrolled with applying pressure seek immediate medical care         SECONDARY DISCHARGE DIAGNOSES  Diagnosis: Lumbar degenerative disc disease  Assessment and Plan of Treatment:    - chronic back pain  -takes Percocet 10/325 PO every 6 hours   follow up with your PCP and or orthopedic doctor   do not Do not drive while taking pain medications.   while taking pain medications and or make important decisions    Diagnosis: H/O aortic aneurysm  Assessment and Plan of Treatment: - history of  Aortic Aneurysm 4.5cm  - CTA Abdomen and Pelvis: IMPRESSION:  No acute intra-abdominal pathology. No evidence of abdominal aortic aneurysm. Renal artery aneurysms are stable.     -ct chest - No enlarged axillary, mediastinal or hilar lymph nodes. No pericardial effusion. Vascular calcifications with involvement of the aorta and the coronary arteries. Left atrium measures enlarged measuring about 4.6 cm in anteroposterior dimension. Aortic root calcification. No pleural effusions. Aortic root at the sinuses of Valsalva measures about 3.8 cm as measured on the sagittal oblique computer-generated reconstructed views without significant interval change in appearance since October 22, 2020 given lack of intravenous contrast. Ascending aorta at the level of the main pulmonary artery measures about 4.1 x 4 cm and the descending thoracic aorta measures about 3.1 cm unchanged. No pleural effusions. Evaluation of the upper abdomen demonstrate partially imaged left renal cyst. For complete evaluation of the renal artery aneurysms, please refer to the dedicated CT angiogram of February 26, 2021. Evaluation of the lungs demonstrate linear or subsegmental atelectasis within the right middle lobe. 2 mm left upper lobe pulmonary nodule on image 51 of series 2 is unchanged. No pneumonia. No central endobronchial lesions. Degenerative changes of the spine.  IMPRESSION: Thoracic aortic measurements as above appear unchanged since October 22, 2020. 2 mm left upper lobe pulmonary nodule is unchanged since October 22, 2020. 1 year follow-up noncontrast chest CT can be performed to ensure stability as clinically warranted.  follow up with your PCP and or cardiologist call for appointment       Diagnosis: Near syncope  Assessment and Plan of Treatment: - echo  - ef 66%   1. Mitral annular calcification, otherwise normal mitral  valve. Mild mitral regurgitation.  2. Calcified trileaflet aortic valve with normal opening.  Mild aortic regurgitation.  3. Normal left ventricular internal dimensions and wall  thicknesses.  4. Normal left ventricular systolic function. No segmental  wall motion abnormalities.  5. Normal right ventricular size and function.  follow up with your cardiologist call for appoitnment       Diagnosis: Atrial fibrillation, unspecified type  Assessment and Plan of Treatment: - continue rate control with Metoprolol   -heparin drip started while in the hospital and transitioned to eliquis for discharge   -s/p successful DCCV (cardioversion)  Stopped heparin drip and started on Eliquis 5mg BID   -LYNDA and DCCV -3/2 lynda - Mitral annular calcification,  Moderate mitral regurgitation.  Vena contracta width about  0.4 cm. Calcified trileaflet aortic valve  Minimal aortic regurgitation. Mild non-mobile atherosclerotic  plaque in the aortic arch and descending thoracic aorta. evidence of a  patent foramen ovale   monitor for signs of bleeing now that eliquis was started   take your medications as perscribed  if you develop a rapid heart rate seek immediate medical attention  f/u - with your Cardiologist Dr. Arnold Ward call for appointment

## 2021-03-02 NOTE — PROGRESS NOTE ADULT - PROBLEM SELECTOR PLAN 2
- CHAD2-VASC: 4   - TSH wnl   - start heparin gtt for AC. if no new nose bleed will transition to NOAC  - continue rate control w/ Metoprolol   - f/u echo  -cardiology consult appreciated
- CHAD2-VASC: 4   - TSH wnl   - start heparin gtt for AC. if no new nose bleed will transition to NOAC  - continue rate control w/ Metoprolol   - f/u echo  -cardiology consult appreciated
- h/o Aortic Aneurysm 4.5cm  - At home Pt is on BB  - CT Chest 4.1x4cm ascenting thoracic aneurysm
- h/o Aortic Aneurysm 4.5cm  - At home Pt is on BB  - CT Chest pending

## 2021-03-02 NOTE — PROGRESS NOTE ADULT - PROBLEM SELECTOR PLAN 4
- MCV = 105.5 (Hgb = 15.1)  - Will get Vit B12, Folate levels
- MCV = 105.5 (Hgb = 15.1)  - Will get Vit B12, Folate levels
- h/o Aortic Aneurysm 4.5cm  - At home Pt is on BB  - CTA Abdomen and Pelvis: IMPRESSION:  No acute intra-abdominal pathology. No evidence of abdominal aortic aneurysm.  Renal artery aneurysms are stable.
- h/o Aortic Aneurysm 4.5cm  - At home Pt is on BB  - CTA Abdomen and Pelvis: IMPRESSION:  No acute intra-abdominal pathology. No evidence of abdominal aortic aneurysm.  Renal artery aneurysms are stable.

## 2021-03-02 NOTE — PROGRESS NOTE ADULT - PROBLEM SELECTOR PLAN 5
- chronic back pain  - Pt takes Percocet 10/325 PO q6  - Dulcolax PRN constipation  - monitor

## 2021-03-02 NOTE — DISCHARGE NOTE PROVIDER - NSDCFUSCHEDAPPT_GEN_ALL_CORE_FT
TAJ HUBBARD ; 04/12/2021 ; [facility] [id number]  TAJ HUBBARD ; 04/28/2021 ; [facility] [id number] [place]

## 2021-03-02 NOTE — DISCHARGE NOTE PROVIDER - CARE PROVIDER_API CALL
Arnold Ward (DO)  Cardiology; Internal Medicine; Nuclear Cardiology  [address], [address]  [place] [address]  Phone: [phone]  Fax: [phone]  Follow Up Time:     Victoria Morgan)  Cardiac Electrophysiology; Cardiology; Internal Medicine  [address] [place] [address]  Phone: [phone]  Fax: [phone]  Follow Up Time:

## 2021-03-02 NOTE — PROGRESS NOTE ADULT - SUBJECTIVE AND OBJECTIVE BOX
[facility] Division of Hospital Medicine  Urban Boyd MD  Pager (M-F, 8A-5P): [phone]  Other Times:  [phone]    Patient is a 75y old  Male who presents with a chief complaint of Epistaxis, Near syncope, New onset atrial fibrillation (02 Mar 2021 11:36)    SUBJECTIVE / OVERNIGHT EVENTS:  Patient offers no new complaints.  No F/C, N/V, CP, SOB, Cough, lightheadedness, dizziness, abdominal pain, diarrhea, dysuria.    MEDICATIONS  (STANDING):  apixaban 5 milliGRAM(s) Oral every 12 hours  bisacodyl 5 milliGRAM(s) Oral at bedtime  metoprolol succinate ER 50 milliGRAM(s) Oral daily  sodium chloride 0.65% Nasal 1 Spray(s) Both Nostrils two times a day    MEDICATIONS  (PRN):  oxycodone    5 mG/acetaminophen 325 mG 2 Tablet(s) Oral every 6 hours PRN Moderate Pain (4 - 6)  oxymetazoline 0.05% Nasal Spray 2 Spray(s) Both Nostrils every 12 hours PRN Epistaxis      Vital Signs Last 24 Hrs  T(C): 36.6 (02 Mar 2021 13:14), Max: 36.9 (01 Mar 2021 21:07)  T(F): 97.8 (02 Mar 2021 13:14), Max: 98.5 (01 Mar 2021 21:07)  HR: 65 (02 Mar 2021 13:14) (61 - 95)  BP: 111/72 (02 Mar 2021 13:14) (102/65 - 112/77)  BP(mean): --  RR: 18 (02 Mar 2021 13:14) (16 - 18)  SpO2: 98% (02 Mar 2021 13:14) (97% - 98%)  CAPILLARY BLOOD GLUCOSE        I&O's Summary      PHYSICAL EXAM:  GENERAL: NAD  HEAD:  Atraumatic, Normocephalic  EYES: EOMI, PERRLA, conjunctiva and sclera clear  NECK: Supple, No JVD  CHEST/LUNG: Clear to auscultation bilaterally; No wheeze  HEART: Regular rate and rhythm; No murmurs, rubs, or gallops  ABDOMEN: Soft, Nontender, Nondistended; Bowel sounds present  EXTREMITIES:  2+ Peripheral Pulses, No clubbing, cyanosis, or edema  PSYCH: Calm  NEUROLOGY: A/Ox3, non-focal  SKIN: No rashes or lesions    LABS:                        14.6   7.54  )-----------( 169      ( 02 Mar 2021 03:49 )             43.6     03-02    140  |  107  |  16  ----------------------------<  93  4.0   |  22  |  0.84    Ca    8.5      02 Mar 2021 03:49  Phos  2.9     03-02  Mg     2.3     03-02      PTT - ( 02 Mar 2021 03:49 )  PTT:60.1 sec          RADIOLOGY & ADDITIONAL TESTS:  < from: CT Chest No Cont (03.01.21 @ 15:19) >  INTERPRETATION:  Clinical indications: Evaluate aortic aneurysm.    Axial CT images of the chest are obtained without intravenous administration of contrast.    Comparison is made with a prior chest CT of October 22, 2020.    No enlarged axillary, mediastinal or hilar lymph nodes.    No pericardial effusion. Vascular calcifications with involvement of the aorta and the coronary arteries. Left atrium measures enlarged measuring about 4.6 cm in anteroposterior dimension. Aortic root calcification. No pleural effusions.    Aortic root at the sinuses of Valsalva measures about 3.8 cm as measured on the sagittal oblique computer-generated reconstructed views without significant intervalchange in appearance since October 22, 2020 given lack of intravenous contrast. Ascending aorta at the level of the main pulmonary artery measures about 4.1 x 4 cm and the descending thoracic aorta measures about 3.1 cm unchanged.    No pleural effusions.    Evaluation of the upper abdomen demonstrate partially imaged left renal cyst. For complete evaluation of the renal artery aneurysms, please refer to the dedicated CT angiogram of February 26, 2021.    Evaluation of the lungs demonstrate linear or subsegmental atelectasis within the right middle lobe. 2 mm left upper lobe pulmonary nodule on image 51 of series 2 is unchanged.    No pneumonia. No central endobronchial lesions.    Degenerative changes of the spine.    IMPRESSION: Thoracic aortic measurements as above appear unchanged since October 22, 2020.    2 mm left upper lobe pulmonary nodule is unchanged since October 22, 2020. 1 year follow-up noncontrast chest CT can be performed to ensure stability as clinically warranted.          < end of copied text >    Imaging Personally Reviewed:    Care Discussed with Consultants/Other Providers:   [facility] Division of Hospital Medicine  Urban Boyd MD  Pager (M-F, 8A-5P): [phone]  Other Times:  [phone]    Patient is a 75y old  Male who presents with a chief complaint of Epistaxis, Near syncope, New onset atrial fibrillation (02 Mar 2021 11:36)    SUBJECTIVE / OVERNIGHT EVENTS:  Patient offers no new complaints.  No F/C, N/V, CP, SOB, Cough, lightheadedness, dizziness, abdominal pain, diarrhea, dysuria.    MEDICATIONS  (STANDING):  apixaban 5 milliGRAM(s) Oral every 12 hours  bisacodyl 5 milliGRAM(s) Oral at bedtime  metoprolol succinate ER 50 milliGRAM(s) Oral daily  sodium chloride 0.65% Nasal 1 Spray(s) Both Nostrils two times a day    MEDICATIONS  (PRN):  oxycodone    5 mG/acetaminophen 325 mG 2 Tablet(s) Oral every 6 hours PRN Moderate Pain (4 - 6)  oxymetazoline 0.05% Nasal Spray 2 Spray(s) Both Nostrils every 12 hours PRN Epistaxis      Vital Signs Last 24 Hrs  T(C): 36.6 (02 Mar 2021 13:14), Max: 36.9 (01 Mar 2021 21:07)  T(F): 97.8 (02 Mar 2021 13:14), Max: 98.5 (01 Mar 2021 21:07)  HR: 65 (02 Mar 2021 13:14) (61 - 95)  BP: 111/72 (02 Mar 2021 13:14) (102/65 - 112/77)  BP(mean): --  RR: 18 (02 Mar 2021 13:14) (16 - 18)  SpO2: 98% (02 Mar 2021 13:14) (97% - 98%)  CAPILLARY BLOOD GLUCOSE        I&O's Summary      PHYSICAL EXAM:  GENERAL: NAD  HEAD:  Atraumatic, Normocephalic  EYES: EOMI, PERRLA, conjunctiva and sclera clear  NECK: Supple, No JVD  CHEST/LUNG: Clear to auscultation bilaterally; No wheeze  HEART: Regular rate and rhythm; No murmurs, rubs, or gallops  ABDOMEN: Soft, Nontender, Nondistended; Bowel sounds present  EXTREMITIES:  2+ Peripheral Pulses, No clubbing, cyanosis, or edema  PSYCH: Calm  NEUROLOGY: A/Ox3, non-focal  SKIN: No rashes or lesions    LABS:                        14.6   7.54  )-----------( 169      ( 02 Mar 2021 03:49 )             43.6     03-02    140  |  107  |  16  ----------------------------<  93  4.0   |  22  |  0.84    Ca    8.5      02 Mar 2021 03:49  Phos  2.9     03-02  Mg     2.3     03-02      PTT - ( 02 Mar 2021 03:49 )  PTT:60.1 sec          RADIOLOGY & ADDITIONAL TESTS:  < from: CT Chest No Cont (03.01.21 @ 15:19) >  INTERPRETATION:  Clinical indications: Evaluate aortic aneurysm.    Axial CT images of the chest are obtained without intravenous administration of contrast.    Comparison is made with a prior chest CT of October 22, 2020.    No enlarged axillary, mediastinal or hilar lymph nodes.    No pericardial effusion. Vascular calcifications with involvement of the aorta and the coronary arteries. Left atrium measures enlarged measuring about 4.6 cm in anteroposterior dimension. Aortic root calcification. No pleural effusions.    Aortic root at the sinuses of Valsalva measures about 3.8 cm as measured on the sagittal oblique computer-generated reconstructed views without significant intervalchange in appearance since October 22, 2020 given lack of intravenous contrast. Ascending aorta at the level of the main pulmonary artery measures about 4.1 x 4 cm and the descending thoracic aorta measures about 3.1 cm unchanged.    No pleural effusions.    Evaluation of the upper abdomen demonstrate partially imaged left renal cyst. For complete evaluation of the renal artery aneurysms, please refer to the dedicated CT angiogram of February 26, 2021.    Evaluation of the lungs demonstrate linear or subsegmental atelectasis within the right middle lobe. 2 mm left upper lobe pulmonary nodule on image 51 of series 2 is unchanged.    No pneumonia. No central endobronchial lesions.    Degenerative changes of the spine.    IMPRESSION: Thoracic aortic measurements as above appear unchanged since October 22, 2020.    2 mm left upper lobe pulmonary nodule is unchanged since October 22, 2020. 1 year follow-up noncontrast chest CT can be performed to ensure stability as clinically warranted.          < end of copied text >  < from: HENRRY w/o TTE (w/3D Echo) (03.02.21 @ 20:04) >  ------------------------------------------------------------------------  CONCLUSIONS:  1. Mitral annular calcification, otherwise normal mitral  valve. Moderate mitral regurgitation.  Vena contracta width  about  0.4 cm.  2. Calcified trileaflet aortic valve with normal opening.  Minimal aortic regurgitation.  3. Normal aortic root.  Mild non-mobile atherosclerotic  plaque in the aortic arch and descending thoracic aorta.  4. Normal left atrium.  No left atrium or left atrial  appendage thrombus.  5. Normal left ventricular systolic function. No segmental  wall motion abnormalities.  6. Normal right ventricular size and function.  7. Agitated saline injection demonstrates evidence of a  patent foramen ovale.    < end of copied text >    Imaging Personally Reviewed:    Care Discussed with Consultants/Other Providers:

## 2021-03-02 NOTE — CHART NOTE - NSCHARTNOTEFT_GEN_A_CORE
Spoke with Dr. Morgan, and recommended to stop heparin drip now and start Eliquis 5mg BID with first dose to be given STAT 3/2. Ordered Eliquis and also made primary team aware. What Is The Reason For Today's Visit?: Full Body Skin Examination What Is The Reason For Today's Visit? (Being Monitored For X): concerning skin lesions on an annual basis Spoke with Dr. Morgan, and recommended to stop heparin drip now and start Eliquis 5mg BID with first dose to be given STAT 3/2. Ordered Eliquis and also made primary team aware. Patient denied any more episodes of epistaxis.

## 2021-03-02 NOTE — DISCHARGE NOTE PROVIDER - CARE PROVIDERS DIRECT ADDRESSES
[email or web address]

## 2021-03-02 NOTE — DISCHARGE NOTE NURSING/CASE MANAGEMENT/SOCIAL WORK - NSDCFUADDAPPT_GEN_ALL_CORE_FT
f/u with your PCP -you have a -2 mm left upper lobe pulmonary nodule is unchanged since October 22, 2020. 1 year follow-up noncontrast chest CT can be performed to ensure stability as clinically warranted  .    Follow up with Dr Morgan call for appointment and time

## 2021-03-02 NOTE — DISCHARGE NOTE PROVIDER - NSDCFUADDAPPT_GEN_ALL_CORE_FT
-2 mm left upper lobe pulmonary nodule is unchanged since October 22, 2020. 1 year follow-up noncontrast chest CT can be performed to ensure stability as clinically warranted. f/u with your PCP -you have a -2 mm left upper lobe pulmonary nodule is unchanged since October 22, 2020. 1 year follow-up noncontrast chest CT can be performed to ensure stability as clinically warranted  .    Follow up with Dr Morgan call for appointment and time

## 2021-03-12 ENCOUNTER — NON-APPOINTMENT (OUTPATIENT)
Age: 76
End: 2021-03-12

## 2021-03-17 ENCOUNTER — NON-APPOINTMENT (OUTPATIENT)
Age: 76
End: 2021-03-17

## 2021-03-17 ENCOUNTER — APPOINTMENT (OUTPATIENT)
Dept: CARDIOLOGY | Facility: CLINIC | Age: 76
End: 2021-03-17
Payer: MEDICARE

## 2021-03-17 VITALS
SYSTOLIC BLOOD PRESSURE: 112 MMHG | HEART RATE: 54 BPM | HEIGHT: 69 IN | OXYGEN SATURATION: 98 % | DIASTOLIC BLOOD PRESSURE: 64 MMHG | BODY MASS INDEX: 27.25 KG/M2 | WEIGHT: 184 LBS

## 2021-03-17 PROCEDURE — 99214 OFFICE O/P EST MOD 30 MIN: CPT

## 2021-03-17 PROCEDURE — 99072 ADDL SUPL MATRL&STAF TM PHE: CPT

## 2021-03-17 PROCEDURE — 93000 ELECTROCARDIOGRAM COMPLETE: CPT

## 2021-03-17 NOTE — PHYSICAL EXAM
[General Appearance - Well Developed] : well developed [Normal Appearance] : normal appearance [Well Groomed] : well groomed [General Appearance - Well Nourished] : well nourished [No Deformities] : no deformities [General Appearance - In No Acute Distress] : no acute distress [Normal Conjunctiva] : the conjunctiva exhibited no abnormalities [Eyelids - No Xanthelasma] : the eyelids demonstrated no xanthelasmas [Normal Oral Mucosa] : normal oral mucosa [No Oral Pallor] : no oral pallor [No Oral Cyanosis] : no oral cyanosis [Normal Jugular Venous A Waves Present] : normal jugular venous A waves present [Normal Jugular Venous V Waves Present] : normal jugular venous V waves present [No Jugular Venous Griffith A Waves] : no jugular venous griffith A waves [Respiration, Rhythm And Depth] : normal respiratory rhythm and effort [Exaggerated Use Of Accessory Muscles For Inspiration] : no accessory muscle use [Auscultation Breath Sounds / Voice Sounds] : lungs were clear to auscultation bilaterally [Heart Rate And Rhythm] : heart rate and rhythm were normal [Heart Sounds] : normal S1 and S2 [Murmurs] : no murmurs present [Abdomen Soft] : soft [Abdomen Tenderness] : non-tender [Abdomen Mass (___ Cm)] : no abdominal mass palpated [Abnormal Walk] : normal gait [Gait - Sufficient For Exercise Testing] : the gait was sufficient for exercise testing [Nail Clubbing] : no clubbing of the fingernails [Cyanosis, Localized] : no localized cyanosis [Petechial Hemorrhages (___cm)] : no petechial hemorrhages [Skin Color & Pigmentation] : normal skin color and pigmentation [] : no rash [No Venous Stasis] : no venous stasis [Skin Lesions] : no skin lesions [No Skin Ulcers] : no skin ulcer [No Xanthoma] : no  xanthoma was observed [Oriented To Time, Place, And Person] : oriented to person, place, and time [Affect] : the affect was normal [Mood] : the mood was normal [No Anxiety] : not feeling anxious

## 2021-03-19 ENCOUNTER — APPOINTMENT (OUTPATIENT)
Dept: CARDIOLOGY | Facility: CLINIC | Age: 76
End: 2021-03-19
Payer: MEDICARE

## 2021-03-19 ENCOUNTER — LABORATORY RESULT (OUTPATIENT)
Age: 76
End: 2021-03-19

## 2021-03-19 NOTE — HISTORY OF PRESENT ILLNESS
[FreeTextEntry1] : Pt presents for follow up s/p hospitalization for epistaxis and hemoptysis on 2/26. Epistaxis resolved with pressure, but pt was subsequently diagnosed w/ A. Fib in the hospital. Pt was started on Heparin drip in the hospital and transitioned to Eliquis s/p successful DCCV. \par The patient here for evaluation of high blood pressure. Patient is currently tolerating the current antihypertensive regime and they deny headaches, stiff neck, visual changes, or PND. The patient has been trying to stay on a low-sodium diet. pt is here for follow up of dilated aorta they have been taking medication as prescribed and blood pressure have been observed in the a reasonable range, their are no reports of worrisome symptoms, such as tearing or "knife like' back pain, chest pain or syncope.

## 2021-03-24 LAB
ALBUMIN SERPL ELPH-MCNC: 4.1 G/DL
ALP BLD-CCNC: 62 U/L
ALT SERPL-CCNC: 13 U/L
ANION GAP SERPL CALC-SCNC: 9 MMOL/L
AST SERPL-CCNC: 17 U/L
BASOPHILS # BLD AUTO: 0.05 K/UL
BASOPHILS NFR BLD AUTO: 0.7 %
BILIRUB DIRECT SERPL-MCNC: 0.2 MG/DL
BILIRUB INDIRECT SERPL-MCNC: 0.4 MG/DL
BILIRUB SERPL-MCNC: 0.6 MG/DL
BUN SERPL-MCNC: 17 MG/DL
CALCIUM SERPL-MCNC: 9.6 MG/DL
CHLORIDE SERPL-SCNC: 105 MMOL/L
CHOLEST SERPL-MCNC: 156 MG/DL
CK SERPL-CCNC: 35 U/L
CO2 SERPL-SCNC: 26 MMOL/L
CREAT SERPL-MCNC: 0.81 MG/DL
EOSINOPHIL # BLD AUTO: 0.2 K/UL
EOSINOPHIL NFR BLD AUTO: 2.9 %
ESTIMATED AVERAGE GLUCOSE: 117 MG/DL
GLUCOSE SERPL-MCNC: 95 MG/DL
HBA1C MFR BLD HPLC: 5.7 %
HCT VFR BLD CALC: 46.1 %
HDLC SERPL-MCNC: 48 MG/DL
HGB BLD-MCNC: 15.1 G/DL
IMM GRANULOCYTES NFR BLD AUTO: 0.1 %
LDLC SERPL CALC-MCNC: 90 MG/DL
LDLC SERPL DIRECT ASSAY-MCNC: 95 MG/DL
LYMPHOCYTES # BLD AUTO: 1.91 K/UL
LYMPHOCYTES NFR BLD AUTO: 28 %
MAN DIFF?: NORMAL
MCHC RBC-ENTMCNC: 32.8 GM/DL
MCHC RBC-ENTMCNC: 34.5 PG
MCV RBC AUTO: 105.3 FL
MONOCYTES # BLD AUTO: 0.57 K/UL
MONOCYTES NFR BLD AUTO: 8.4 %
NEUTROPHILS # BLD AUTO: 4.08 K/UL
NEUTROPHILS NFR BLD AUTO: 59.9 %
NONHDLC SERPL-MCNC: 108 MG/DL
PLATELET # BLD AUTO: 196 K/UL
POTASSIUM SERPL-SCNC: 4.2 MMOL/L
PROT SERPL-MCNC: 7 G/DL
RBC # BLD: 4.38 M/UL
RBC # FLD: 11.9 %
SODIUM SERPL-SCNC: 140 MMOL/L
TRIGL SERPL-MCNC: 91 MG/DL
WBC # FLD AUTO: 6.82 K/UL

## 2021-03-25 LAB
FOLATE SERPL-MCNC: 19.2 NG/ML
VIT B12 SERPL-MCNC: 490 PG/ML

## 2021-04-01 ENCOUNTER — APPOINTMENT (OUTPATIENT)
Dept: CT IMAGING | Facility: IMAGING CENTER | Age: 76
End: 2021-04-01
Payer: MEDICARE

## 2021-04-01 ENCOUNTER — NON-APPOINTMENT (OUTPATIENT)
Age: 76
End: 2021-04-01

## 2021-04-01 ENCOUNTER — OUTPATIENT (OUTPATIENT)
Dept: OUTPATIENT SERVICES | Facility: HOSPITAL | Age: 76
LOS: 1 days | End: 2021-04-01
Payer: MEDICARE

## 2021-04-01 DIAGNOSIS — Z98.890 OTHER SPECIFIED POSTPROCEDURAL STATES: Chronic | ICD-10-CM

## 2021-04-01 DIAGNOSIS — J47.9 BRONCHIECTASIS, UNCOMPLICATED: ICD-10-CM

## 2021-04-01 DIAGNOSIS — Z98.89 OTHER SPECIFIED POSTPROCEDURAL STATES: Chronic | ICD-10-CM

## 2021-04-01 DIAGNOSIS — Z00.8 ENCOUNTER FOR OTHER GENERAL EXAMINATION: ICD-10-CM

## 2021-04-01 PROCEDURE — 71250 CT THORAX DX C-: CPT

## 2021-04-01 PROCEDURE — 71250 CT THORAX DX C-: CPT | Mod: 26

## 2021-04-05 ENCOUNTER — NON-APPOINTMENT (OUTPATIENT)
Age: 76
End: 2021-04-05

## 2021-04-08 ENCOUNTER — APPOINTMENT (OUTPATIENT)
Dept: PULMONOLOGY | Facility: CLINIC | Age: 76
End: 2021-04-08

## 2021-04-08 RX ORDER — PNV NO.95/FERROUS FUM/FOLIC AC 28MG-0.8MG
TABLET ORAL
Refills: 0 | Status: ACTIVE | COMMUNITY

## 2021-04-08 RX ORDER — TOBRAMYCIN AND DEXAMETHASONE 3; 1 MG/ML; MG/ML
0.3-0.1 SUSPENSION/ DROPS OPHTHALMIC
Qty: 5 | Refills: 0 | Status: DISCONTINUED | COMMUNITY
Start: 2020-05-27 | End: 2021-04-08

## 2021-04-08 RX ORDER — NYSTATIN AND TRIAMCINOLONE ACETONIDE 100000; 1 MG/G; MG/G
100000-0.1 CREAM TOPICAL TWICE DAILY
Qty: 1 | Refills: 1 | Status: DISCONTINUED | COMMUNITY
Start: 2020-09-18 | End: 2021-04-08

## 2021-04-08 RX ORDER — OXYCODONE HYDROCHLORIDE AND ACETAMINOPHEN 5; 325 MG/1; MG/1
5-325 TABLET ORAL
Refills: 0 | Status: ACTIVE | COMMUNITY

## 2021-04-08 RX ORDER — OXYCODONE HYDROCHLORIDE 15 MG/1
15 TABLET ORAL
Qty: 120 | Refills: 0 | Status: DISCONTINUED | COMMUNITY
Start: 2020-09-17 | End: 2021-04-08

## 2021-04-08 RX ORDER — PREGABALIN 50 MG/1
50 CAPSULE ORAL
Qty: 60 | Refills: 0 | Status: DISCONTINUED | COMMUNITY
Start: 2020-07-17 | End: 2021-04-08

## 2021-04-12 ENCOUNTER — NON-APPOINTMENT (OUTPATIENT)
Age: 76
End: 2021-04-12

## 2021-04-12 ENCOUNTER — APPOINTMENT (OUTPATIENT)
Dept: ELECTROPHYSIOLOGY | Facility: CLINIC | Age: 76
End: 2021-04-12
Payer: MEDICARE

## 2021-04-12 VITALS
HEIGHT: 69 IN | HEART RATE: 66 BPM | OXYGEN SATURATION: 97 % | BODY MASS INDEX: 28.21 KG/M2 | DIASTOLIC BLOOD PRESSURE: 78 MMHG | TEMPERATURE: 97.4 F | SYSTOLIC BLOOD PRESSURE: 114 MMHG

## 2021-04-12 VITALS — BODY MASS INDEX: 28.21 KG/M2 | WEIGHT: 191 LBS

## 2021-04-12 PROCEDURE — 99072 ADDL SUPL MATRL&STAF TM PHE: CPT

## 2021-04-12 PROCEDURE — 99215 OFFICE O/P EST HI 40 MIN: CPT

## 2021-04-12 PROCEDURE — 93224 XTRNL ECG REC UP TO 48 HRS: CPT

## 2021-04-12 PROCEDURE — 93000 ELECTROCARDIOGRAM COMPLETE: CPT

## 2021-04-12 NOTE — HISTORY OF PRESENT ILLNESS
[FreeTextEntry1] : Arnold Ward MD\par \par Urban Miranda is a 74y/o man with Hx of aortic aneurysm (4.5cm), colon and bladder ca s/p chemo, and recent hospitalization for epistaxis and newly found afib s/p HENRRY/DCCV on 3/2/2021 who presents today for routine f/u. Admits doing well with no issues or complaints. Denies chest pain, palpitations, SOB, syncope or near syncope. Now on Eliquis without issues bleeding. Upcoming procedure to assess status of his bladder ca/assess polyps. No planned biopsy as per patient. Recently underwent 24 hour Holter with Dr. Ward, unsure of results.

## 2021-04-12 NOTE — DISCUSSION/SUMMARY
[FreeTextEntry1] : Urban Miranda is a 76y/o man with Hx of aortic aneurysm (4.5cm), colon and bladder ca s/p chemo, and recent hospitalization for epistaxis and newly found afib s/p HENRRY/DCCV on 3/2/2021 who presents today for routine f/u. \par \par Impression:\par \par 1. Paroxysmal afib: s/p HENRRY/DCCV on 3/2/2021. EKG performed today to assess for presence of recurrent afib and reveals NSR. Unsure of afib recurrence and concern about long term AC use given recent bleeding. Recommend undergoing ILR placement for long term monitoring of recurrent afib and to allow for possible safe discontinuation of Eliquis in future if possible. Risks, benefits, and alternatives discussed. \par \par 2. Bladder ca: upcoming procedure the end of May. May likely need to hold Eliquis prior to procedure. As he remains afib free, may be safely hold Eliquis prior to procedure. From an Electrophysiology standpoint, he may undergo scheduled procedure and hold Eliquis as required prior to procedure.  \par \par 3. Epistaxis: recent hospital for epistaxis, no recurrent bleeding since that time. No bleeding or issue with Eliquis. Concern about long term use of anticoagulation. Recommend undergoing placement of ILR for long term monitoring of afib and to allow for safe discontinuation of AC in future. Risks, benefits, and alternatives discussed.\par \par Plan for ILR placement.

## 2021-04-19 ENCOUNTER — APPOINTMENT (OUTPATIENT)
Dept: CARDIOLOGY | Facility: CLINIC | Age: 76
End: 2021-04-19
Payer: MEDICARE

## 2021-04-19 VITALS
HEIGHT: 69 IN | SYSTOLIC BLOOD PRESSURE: 122 MMHG | DIASTOLIC BLOOD PRESSURE: 75 MMHG | WEIGHT: 191 LBS | BODY MASS INDEX: 28.29 KG/M2 | TEMPERATURE: 98.6 F

## 2021-04-19 VITALS — OXYGEN SATURATION: 98 % | HEART RATE: 68 BPM

## 2021-04-19 PROCEDURE — 99072 ADDL SUPL MATRL&STAF TM PHE: CPT

## 2021-04-19 PROCEDURE — 99214 OFFICE O/P EST MOD 30 MIN: CPT

## 2021-04-19 NOTE — HISTORY OF PRESENT ILLNESS
[FreeTextEntry1] : I was asked to see this delightful patient today for Pre-op Evaluation  prior to  cystoscopy with   Dr. Caicedo   .  The patient denies fever, cough, wheezing, sputum production, hemoptysis, dyspnea, congestion, diarrhea, constipation, nausea, vomiting, bright red blood per rectum, melena, angina, chest pain, palpitations, diaphoresis, PND, incontinence, frequency, urgency, dysuria, edema, joint pain, headache, weakness, numbness and dizziness. The date of the planned procedure is: 05/25/2021\par The patient is also here for follow-up of atrial fibrillation and currently they feel well with an occasional episode of palpitations.  The patient states they are reliably taking the anticoagulation medicine and are not having any signs of bleeding they deny any dizziness headaches nosebleeds or black tarry stools\par Pt on Eliquis s/p DDCV. Pt was advised to have ILR placed for long term monitoring of recurrent afib and to allow for possible safe discontinuation of Eliquis. \par The patient here for evaluation of high blood pressure. Patient is currently tolerating the current antihypertensive regime and they deny headaches, stiff neck, visual changes, or PND. The patient has been trying to stay on a low-sodium diet.\par pt is here for follow up of TAA they have been taking medication as prescribed  and  blood pressure have been observed in the a reasonable range, their are no reports of SSCP , back pain or syncope.\par \par \par \par

## 2021-05-02 ENCOUNTER — APPOINTMENT (OUTPATIENT)
Dept: DISASTER EMERGENCY | Facility: CLINIC | Age: 76
End: 2021-05-02

## 2021-05-11 ENCOUNTER — OUTPATIENT (OUTPATIENT)
Dept: OUTPATIENT SERVICES | Facility: HOSPITAL | Age: 76
LOS: 1 days | End: 2021-05-11
Payer: MEDICARE

## 2021-05-11 VITALS
HEIGHT: 68 IN | RESPIRATION RATE: 18 BRPM | DIASTOLIC BLOOD PRESSURE: 79 MMHG | WEIGHT: 186.95 LBS | OXYGEN SATURATION: 97 % | SYSTOLIC BLOOD PRESSURE: 132 MMHG | TEMPERATURE: 98 F | HEART RATE: 52 BPM

## 2021-05-11 DIAGNOSIS — Z01.818 ENCOUNTER FOR OTHER PREPROCEDURAL EXAMINATION: ICD-10-CM

## 2021-05-11 DIAGNOSIS — Z98.890 OTHER SPECIFIED POSTPROCEDURAL STATES: Chronic | ICD-10-CM

## 2021-05-11 DIAGNOSIS — I48.91 UNSPECIFIED ATRIAL FIBRILLATION: ICD-10-CM

## 2021-05-11 DIAGNOSIS — C67.9 MALIGNANT NEOPLASM OF BLADDER, UNSPECIFIED: ICD-10-CM

## 2021-05-11 DIAGNOSIS — Z98.89 OTHER SPECIFIED POSTPROCEDURAL STATES: Chronic | ICD-10-CM

## 2021-05-11 PROCEDURE — 85027 COMPLETE CBC AUTOMATED: CPT

## 2021-05-11 PROCEDURE — 87086 URINE CULTURE/COLONY COUNT: CPT

## 2021-05-11 PROCEDURE — G0463: CPT

## 2021-05-11 PROCEDURE — 80048 BASIC METABOLIC PNL TOTAL CA: CPT

## 2021-05-11 RX ORDER — SODIUM CHLORIDE 9 MG/ML
3 INJECTION INTRAMUSCULAR; INTRAVENOUS; SUBCUTANEOUS EVERY 8 HOURS
Refills: 0 | Status: DISCONTINUED | OUTPATIENT
Start: 2021-05-25 | End: 2021-06-08

## 2021-05-11 RX ORDER — LIDOCAINE HCL 20 MG/ML
0.2 VIAL (ML) INJECTION ONCE
Refills: 0 | Status: DISCONTINUED | OUTPATIENT
Start: 2021-05-25 | End: 2021-06-08

## 2021-05-11 RX ORDER — CEFAZOLIN SODIUM 1 G
2000 VIAL (EA) INJECTION ONCE
Refills: 0 | Status: DISCONTINUED | OUTPATIENT
Start: 2021-05-25 | End: 2021-06-08

## 2021-05-11 NOTE — H&P PST ADULT - NSICDXPASTSURGICALHX_GEN_ALL_CORE_FT
PAST SURGICAL HISTORY:  H/O eye surgery 1/2017    History of cystoscopy TURBT - multiple procedures ( last procedure 2/2016)  Cystoscopy and bladder Biopsy -  7/2020    History of hernia repair

## 2021-05-11 NOTE — H&P PST ADULT - NSICDXPASTMEDICALHX_GEN_ALL_CORE_FT
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

## 2021-05-11 NOTE — H&P PST ADULT - NSICDXPROBLEM_GEN_ALL_CORE_FT
PROBLEM DIAGNOSES  Problem: Malignant neoplasm of bladder, unspecified  Assessment and Plan: coming in for cystoscopy bladder biopsy    Problem: Afib  Assessment and Plan: hold eliquis 2days

## 2021-05-14 ENCOUNTER — NON-APPOINTMENT (OUTPATIENT)
Age: 76
End: 2021-05-14

## 2021-05-17 ENCOUNTER — APPOINTMENT (OUTPATIENT)
Dept: CARDIOLOGY | Facility: CLINIC | Age: 76
End: 2021-05-17
Payer: MEDICARE

## 2021-05-17 VITALS
BODY MASS INDEX: 28.29 KG/M2 | HEIGHT: 69 IN | DIASTOLIC BLOOD PRESSURE: 60 MMHG | WEIGHT: 191 LBS | OXYGEN SATURATION: 99 % | SYSTOLIC BLOOD PRESSURE: 124 MMHG | HEART RATE: 50 BPM

## 2021-05-17 DIAGNOSIS — Z01.818 ENCOUNTER FOR OTHER PREPROCEDURAL EXAMINATION: ICD-10-CM

## 2021-05-17 PROCEDURE — 99072 ADDL SUPL MATRL&STAF TM PHE: CPT

## 2021-05-17 PROCEDURE — 78452 HT MUSCLE IMAGE SPECT MULT: CPT

## 2021-05-17 PROCEDURE — 93015 CV STRESS TEST SUPVJ I&R: CPT

## 2021-05-17 PROCEDURE — 99214 OFFICE O/P EST MOD 30 MIN: CPT | Mod: 25

## 2021-05-17 PROCEDURE — A9500: CPT

## 2021-05-17 RX ORDER — REGADENOSON 0.08 MG/ML
0.4 INJECTION, SOLUTION INTRAVENOUS
Qty: 1 | Refills: 0 | Status: COMPLETED | OUTPATIENT
Start: 2021-05-17

## 2021-05-17 RX ADMIN — REGADENOSON 5 MG/5ML: 0.08 INJECTION, SOLUTION INTRAVENOUS at 00:00

## 2021-05-17 NOTE — HISTORY OF PRESENT ILLNESS
[FreeTextEntry1] : I was asked to see this delightful patient today for Pre-op Evaluation  prior to cystoscopy with   Dr. Caicedo  .  The patient denies fever, cough, wheezing, sputum production, hemoptysis, dyspnea, congestion, diarrhea, constipation, nausea, vomiting, bright red blood per rectum, melena, angina, chest pain, palpitations, diaphoresis, PND, incontinence, frequency, urgency, dysuria, edema, joint pain, headache, weakness, numbness and dizziness. The date of the planned procedure is: 5/25/2021\par  \par The patient is also here for follow-up of atrial fibrillation and currently they feel well with an occasional episode of palpitations.  The patient states they are reliably taking the anticoagulation medicine and are not having any signs of bleeding they deny any dizziness headaches nosebleeds or black tarry stools \par pt is here for follow up of TAA they have been taking medication as prescribed  and  blood pressure have been observed in the a resonable range, their are no reports of SSCP , back pain or syncope.

## 2021-05-18 ENCOUNTER — NON-APPOINTMENT (OUTPATIENT)
Age: 76
End: 2021-05-18

## 2021-05-19 ENCOUNTER — APPOINTMENT (OUTPATIENT)
Dept: ELECTROPHYSIOLOGY | Facility: CLINIC | Age: 76
End: 2021-05-19

## 2021-05-19 PROBLEM — Z86.69 PERSONAL HISTORY OF OTHER DISEASES OF THE NERVOUS SYSTEM AND SENSE ORGANS: Chronic | Status: ACTIVE | Noted: 2021-05-11

## 2021-05-22 ENCOUNTER — OUTPATIENT (OUTPATIENT)
Dept: OUTPATIENT SERVICES | Facility: HOSPITAL | Age: 76
LOS: 1 days | End: 2021-05-22
Payer: MEDICARE

## 2021-05-22 DIAGNOSIS — Z98.890 OTHER SPECIFIED POSTPROCEDURAL STATES: Chronic | ICD-10-CM

## 2021-05-22 DIAGNOSIS — Z98.89 OTHER SPECIFIED POSTPROCEDURAL STATES: Chronic | ICD-10-CM

## 2021-05-22 DIAGNOSIS — Z11.52 ENCOUNTER FOR SCREENING FOR COVID-19: ICD-10-CM

## 2021-05-22 LAB — SARS-COV-2 RNA SPEC QL NAA+PROBE: SIGNIFICANT CHANGE UP

## 2021-05-22 PROCEDURE — U0003: CPT

## 2021-05-22 PROCEDURE — C9803: CPT

## 2021-05-22 PROCEDURE — U0005: CPT

## 2021-05-24 ENCOUNTER — TRANSCRIPTION ENCOUNTER (OUTPATIENT)
Age: 76
End: 2021-05-24

## 2021-05-25 ENCOUNTER — OUTPATIENT (OUTPATIENT)
Dept: OUTPATIENT SERVICES | Facility: HOSPITAL | Age: 76
LOS: 1 days | Discharge: HOME | End: 2021-05-25
Payer: MEDICARE

## 2021-05-25 ENCOUNTER — APPOINTMENT (OUTPATIENT)
Dept: UROLOGY | Facility: HOSPITAL | Age: 76
End: 2021-05-25

## 2021-05-25 VITALS
RESPIRATION RATE: 18 BRPM | TEMPERATURE: 98 F | OXYGEN SATURATION: 97 % | DIASTOLIC BLOOD PRESSURE: 62 MMHG | HEART RATE: 61 BPM | SYSTOLIC BLOOD PRESSURE: 124 MMHG

## 2021-05-25 VITALS
DIASTOLIC BLOOD PRESSURE: 79 MMHG | HEART RATE: 52 BPM | OXYGEN SATURATION: 97 % | RESPIRATION RATE: 18 BRPM | SYSTOLIC BLOOD PRESSURE: 132 MMHG

## 2021-05-25 DIAGNOSIS — Z98.890 OTHER SPECIFIED POSTPROCEDURAL STATES: Chronic | ICD-10-CM

## 2021-05-25 DIAGNOSIS — Z98.89 OTHER SPECIFIED POSTPROCEDURAL STATES: Chronic | ICD-10-CM

## 2021-05-25 DIAGNOSIS — C67.9 MALIGNANT NEOPLASM OF BLADDER, UNSPECIFIED: ICD-10-CM

## 2021-05-25 PROCEDURE — 52234 CYSTOSCOPY AND TREATMENT: CPT

## 2021-05-25 PROCEDURE — 52224 CYSTOSCOPY AND TREATMENT: CPT

## 2021-05-25 RX ORDER — ACETAMINOPHEN 500 MG
1000 TABLET ORAL ONCE
Refills: 0 | Status: DISCONTINUED | OUTPATIENT
Start: 2021-05-25 | End: 2021-06-08

## 2021-05-25 RX ORDER — ONDANSETRON 8 MG/1
4 TABLET, FILM COATED ORAL ONCE
Refills: 0 | Status: DISCONTINUED | OUTPATIENT
Start: 2021-05-25 | End: 2021-06-08

## 2021-05-25 RX ORDER — SODIUM CHLORIDE 9 MG/ML
1000 INJECTION, SOLUTION INTRAVENOUS
Refills: 0 | Status: DISCONTINUED | OUTPATIENT
Start: 2021-05-25 | End: 2021-06-08

## 2021-05-25 NOTE — ASU PATIENT PROFILE, ADULT - PSH
H/O eye surgery  1/2017  History of cystoscopy  TURBT - multiple procedures ( last procedure 2/2016)  Cystoscopy and bladder Biopsy -  7/2020  History of hernia repair

## 2021-05-25 NOTE — ASU PATIENT PROFILE, ADULT - NSSUBSTANCEUSE_GEN_ALL_CORE_SD
caffeine
Wt: 155.8 pounds, Ht: 64 inches, BMI: 26.7kg/m2, IBW: 120 pounds +/-10%  As per chart, pt. is a 53 year old female PMH anxiety, ADD, former smoker fainting, MVP. Pt. was transferred from OSH for bradycardia. PPM pending for tomorrow. As per flow sheets, pt. does not have any edema or pressure injuries.

## 2021-05-25 NOTE — ASU DISCHARGE PLAN (ADULT/PEDIATRIC) - ASU DC SPECIAL INSTRUCTIONSFT
You may take Tylenol and Ibuprofen over the counter every 6 hours for pain.     You may resume your eliquis tomorrow.    Please follow up with Dr Caicedo in 2-3 weeks. Please call the office to schedule your appointment.

## 2021-05-25 NOTE — ASU DISCHARGE PLAN (ADULT/PEDIATRIC) - CARE PROVIDER_API CALL
Todd Caicedo)  Urology  [address], [place] [address]  Phone: [phone]  Fax: [phone]  Follow Up Time:

## 2021-05-25 NOTE — ASU PATIENT PROFILE, ADULT - PMH
Afib  Eliquis  Aortic aneurysm  4.5cm  Bladder tumor    Blind left eye  Left eye prosthesis - from birth  Colon cancer    Epistaxis  hosp 3/2021  Hepatitis C  Pt was treated 7 years ago  History of sciatica  leg pains  Lumbar disc disease    Malignant neoplasm of colon, unspecified part of colon  s/p microsurgery at [facility]  no chemo/XRT in remission since 2012

## 2021-05-25 NOTE — PRE-ANESTHESIA EVALUATION ADULT - NSANTHPMHFT_GEN_ALL_CORE
75yr old male with hx of bladder cancer treated with chemo now coming in for   cystoscopy to check bladder and take biopsy. Pt had covid vaccine x2  Hx sig for Afib aortic aneurysm 4.5 sciatica.   admitted [facility] 4/2021 for epistaxis, resolved on its own per pt  stress 5/2021 wnl per pt  ET limited by leg/back pain

## 2021-05-27 ENCOUNTER — APPOINTMENT (OUTPATIENT)
Dept: ELECTROPHYSIOLOGY | Facility: CLINIC | Age: 76
End: 2021-05-27
Payer: MEDICARE

## 2021-05-27 VITALS
DIASTOLIC BLOOD PRESSURE: 92 MMHG | HEIGHT: 69 IN | WEIGHT: 189 LBS | OXYGEN SATURATION: 96 % | BODY MASS INDEX: 27.99 KG/M2 | SYSTOLIC BLOOD PRESSURE: 158 MMHG | TEMPERATURE: 98.3 F | HEART RATE: 64 BPM

## 2021-05-27 PROCEDURE — 99215 OFFICE O/P EST HI 40 MIN: CPT

## 2021-05-27 PROCEDURE — 99072 ADDL SUPL MATRL&STAF TM PHE: CPT

## 2021-05-27 PROCEDURE — 93000 ELECTROCARDIOGRAM COMPLETE: CPT

## 2021-05-27 NOTE — PROGRESS NOTE ADULT - PROVIDER SPECIALTY LIST ADULT
Cardiology
Duration Of Freeze Thaw-Cycle (Seconds): 0
Post-Care Instructions: I reviewed with the patient in detail post-care instructions. Patient is to wear sunprotection, and avoid picking at any of the treated lesions. Pt may apply Vaseline to crusted or scabbing areas.
Detail Level: Detailed
Consent: The patient's consent was obtained including but not limited to risks of crusting, scabbing, blistering, scarring, darker or lighter pigmentary change, recurrence, incomplete removal and infection.
Hospitalist

## 2021-05-27 NOTE — DISCUSSION/SUMMARY
[FreeTextEntry1] : Urban Miranda is a 76y/o man with Hx of aortic aneurysm (4.5cm), colon and bladder ca s/p chemo, and recent hospitalization for epistaxis and newly found afib s/p HENRRY/DCCV on 3/2/2021 who presents today for routine f/u. \par \par Impression:\par \par 1. Paroxysmal afib: s/p HENRRY/DCCV on 3/2/2021. EKG performed today to assess for presence of recurrent afib and reveals NSR. Unsure of afib recurrence and concern about long term AC use given recent bleeding. Recommend undergoing ILR placement for long term monitoring of recurrent afib and to allow for possible safe discontinuation of Eliquis in future if possible. Risks, benefits, and alternatives discussed. \par \par 2. Bladder ca: known bladder ca with increased risk of bleeding. Concern about long term use of anticoagulation. Recommend undergoing placement of ILR for long term monitoring of afib and to allow for safe discontinuation of AC in future. Risks, benefits, and alternatives discussed.\par \par 3. Epistaxis: hospitalized in past for epistaxis, no recurrent bleeding since that time. No bleeding or issue with Eliquis. Concern about long term use of anticoagulation. Recommend undergoing placement of ILR for long term monitoring of afib and to allow for safe discontinuation of AC in future. Risks, benefits, and alternatives discussed.\par \par Plan for ILR placement.

## 2021-06-07 ENCOUNTER — NON-APPOINTMENT (OUTPATIENT)
Age: 76
End: 2021-06-07

## 2021-06-10 NOTE — ED PROVIDER NOTE - CPE EDP NEURO NORM
Problem: Discharge Barriers  Goal: Patient s discharge needs will be met  Outcome: Progressing  Care Plan-Care Management    Care Management Goals of the Day:   Follow progression of care.     Care Progression to be reviewed with MD and RN CM: Updates to be provided to Charge RN, CMSW, HUC,therapy and other disciplines as indicated.    Barriers to Discharge and plan:  Abscess drain (per ID, 'patient admitted with acute diverticulitis with perforation on 8/11/2020. Cultures from abscess drainage on 8/17/2020 with Candida albicans. CT scan on 8/20/2020 with near complete resolution of the abscess.') Currently receiving IV Fluconazole, IV Meropenem, IV Vancomycin (has a PICC line in place). Supplemental oxygen at 7 liters (does not wear oxygen at baseline).    Family Care Conference: NA at this time. Daughter Aria is primary healthcare agent, granddaughter Tanika is first alternate.     Discharge Disposition:   Return to long term care.     Expected Discharge Date:  To be determined.     Transportation:  [facility] transport.     Care Management/Coordination Narrative:   Patient is a long term care resident of [facility] where her bed is on an 18-day bed hold. Goal is return to [facility] when medically ready for discharge. Per nursing at [facility], patient does not wear oxygen at baseline.     Negative for COVID on 8/11/2020 and 8/22/2020.     Abbreviation  Code:  [facility] ([facility] WC), [facility] ([facility] STR), Patient (pt), Transitional Care Unit (TCU), Skilled Nursing Facility (SNF), Long Term Care (LTC), Assisted Living ([facility] or AL), Care Management (CM), Physical Therapy (PT), Occupational Therapy (OT), Speech Therapy (ST), Respiratory Therapy (RT).           normal...

## 2021-07-09 NOTE — ED ADULT NURSE NOTE - HIV OFFER
Called patient, verified id x2. Gave patient verbal message, per ROSANNA Ca. He understood verbal message given. Opt out

## 2021-08-24 ENCOUNTER — APPOINTMENT (OUTPATIENT)
Dept: GASTROENTEROLOGY | Facility: CLINIC | Age: 76
End: 2021-08-24
Payer: MEDICARE

## 2021-08-24 VITALS
HEART RATE: 56 BPM | BODY MASS INDEX: 26.25 KG/M2 | SYSTOLIC BLOOD PRESSURE: 126 MMHG | TEMPERATURE: 97.7 F | OXYGEN SATURATION: 98 % | HEIGHT: 69 IN | DIASTOLIC BLOOD PRESSURE: 70 MMHG | WEIGHT: 177.25 LBS

## 2021-08-24 DIAGNOSIS — Z86.19 PERSONAL HISTORY OF OTHER INFECTIOUS AND PARASITIC DISEASES: ICD-10-CM

## 2021-08-24 PROCEDURE — 99213 OFFICE O/P EST LOW 20 MIN: CPT

## 2021-08-24 NOTE — HISTORY OF PRESENT ILLNESS
[FreeTextEntry1] : The patient has a history of hepatitis C with a sustained viral response and a history of adenomatous colonic polyps.  Blood work from his last visit on January 14, 2021 revealed normal LFTs with undetectable hepatitis C.  HCV FibroSure testing revealed no inflammation but F3 disease consistent with significant fibrosis.  The patient subsequently underwent a FibroScan on February 5, 2021 which did not show any evidence of fibrosis.  The patient has been undergoing treatment for bladder cancer and was hospitalized in March for nosebleeds and atrial fibrillation.  He underwent cardioversion on March 2, 2021.  He also has an aortic aneurysm measuring 4.5 cm.  He feels well denying abdominal pain, heartburn, dysphagia.  Bowel movements are normal without melena or bright red blood per rectum.  The patient denies any alcohol intake.  He remains on Eliquis.\par \par \par Note from 1/14/2021 - The patient has a history of hepatitis C with a sustained viral response.  He also has a history of adenomatous colonic polyps and has bladder cancer which was treated with chemotherapy infused directly into the bladder.  He has a thoracic aortic aneurysm measuring 4.5 cm which is followed by cardiology.  The patient feels well denying abdominal pain, heartburn, dysphagia.  He has 2-3 solid bowel movements a day without melena or bright red blood per rectum.  The patient's weight is stable.  He has not been hospitalized in the past year.\par \par \par Note from 1/7/2019 - We reviewed the evaluations done since the patient's last visit on August 31, 2018. Blood work from that day was normal with no evidence of hepatitis C. The patient is status post colonoscopy performed on October 31, 2018. A polyp was removed with negative biopsies. The patient also has diverticulosis and internal hemorrhoids which are asymptomatic. The patient denies abdominal pain. Bowel movements are normal without melena or bright blood per rectum.\par \par \par Note from 8/31/18 - The patient has a history of colonic polyps and a history of hep C with a sustained viral response. His blood work from his last visit in August 2017 revealed an undetectable HCVRNA. The patient has had for eye surgeries in the past 2 months and 8 in the past 2 years but is otherwise well. He denies abdominal pain. He is to solid bowel movements a day without melena or bright red blood per rectum. He denies heartburn or dysphagia. He has lost 8 pounds in the past year. The patient has not been hospitalized in the past year and denies any cardiac issues.\par \par \par Note from 8/15/17 - The patient has a history of colonic polyps, a history of bladder cancer, and a history of hepatitis C which was treated in 2009 with Pegasys and ribavirin. Despite only taking 14 weeks of treatment due to a drug eruption, he is achieved a sustained viral response. The patient feels well denying abdominal pain, diarrhea, constipation, melena, bright blood per rectum, heartburn, or dysphagia. His weight is stable. The patient has not been hospitalized in the past year and denies any cardiac issues. The patient takes Percocet essentially daily for back and leg pain.

## 2021-08-24 NOTE — CONSULT LETTER
[FreeTextEntry1] : Dear Dr. Shana Nix,\[facility] \[facility] I had the pleasure of seeing your patient TAJ HUBBARD in the office today.  My office note is attached. PLEASE READ THE "ASSESSMENT" SECTION OF THE NOTE TO SEE MY IMPRESSION AND PLAN.\par \[facility] Thank you very much for allowing me to participate in the care of your patient.\[facility] \[facility] Sincerely,\[facility] \[facility] Yeison Alex M.D., FAC, [facility] Director, Celiac Program at [facility]  of Medicine\[facility] [employer] at [facility] Practice Director,\[facility] - Gastroenterology/Internal Medicine at [address] - [address]\[place], [place] [address] Tel: [phone]\[facility] Email: [email or web address] \[facility] \[facility] The attached note has been created using a voice recognition system (Dragon).  There may be some misspellings and typos.  Please call my office if you have any issues or questions.

## 2021-08-24 NOTE — ASSESSMENT
[FreeTextEntry1] : Patient with a history of hepatitis C and a sustained viral response.  Virus has been undetectable.  There was a question of significant fibrosis on blood work but FibroScan did not show any evidence of fibrosis.  He has a history of adenomatous colonic polyps.\par \par Blood work will be sent for LFTs, PT, alpha-fetoprotein.\par \par Although the patient is due for a colonoscopy in November, I advised the patient that we should defer it as he is currently being treated for bladder cancer and has his cardiac issues.  He will return to see me in January at which time we will consider colonoscopy.  If we do, he will require both cardiac and anesthesia clearance prior to the procedure.\par \par \par Plan from 1/14/2021 - Patient with a history of hepatitis C which was treated with a sustained viral response and a history of adenomatous colonic polyps.\par \par Blood work was sent for HCVRNA, HCV FibroSure testing, LFTs, PT, alpha-fetoprotein.\par \par Patient will return to see me in July.  He is due for colonoscopy in November of this year.\par \par \par Plan from 1/7/2019 - Patient status post colonoscopy with diverticulosis. He has a history of colonic polyps. He has a history of hepatitis C with negative labs in August.\par \par Information was given to the patient regarding diverticulosis and a high-fiber diet.\par \par Patient will return to see me over the summer at which time we will check labs which should be followed every 6-12 months.\par \par We will repeat a colonoscopy in 3 years that is November 2021 given the patient's history of polyps.\par \par \par Plan from 8/31/18 - Patient with a history of colonic polyps and a history of hepatitis C with sustained response.\par \par A colonoscopy has been scheduled. The risks, benefits, alternatives, and limitations of the procedure, including the possibility of missed lesions, were explained.\par \par Blood work was sent for HCVRNA, LFTs, AFP.\par \par \par Plan from 8/15/17 - Patient with a history of colonic polyps and history of hepatitis C with sustained viral response to treatment.\par \par Blood work was sent for HCV RNA, LFTs, AFP.\par \par Patient is not yet do for his colonoscopy. He will return to see me in one year at which time we will pursue colonoscopy.

## 2021-08-25 ENCOUNTER — NON-APPOINTMENT (OUTPATIENT)
Age: 76
End: 2021-08-25

## 2021-08-25 LAB
AFP-TM SERPL-MCNC: <1.8 NG/ML
ALBUMIN SERPL ELPH-MCNC: 3.9 G/DL
ALP BLD-CCNC: 60 U/L
ALT SERPL-CCNC: 15 U/L
AST SERPL-CCNC: 14 U/L
BILIRUB DIRECT SERPL-MCNC: 0.1 MG/DL
BILIRUB INDIRECT SERPL-MCNC: 0.4 MG/DL
BILIRUB SERPL-MCNC: 0.5 MG/DL
GGT SERPL-CCNC: 30 U/L
INR PPP: 1.18 RATIO
PROT SERPL-MCNC: 6.5 G/DL
PT BLD: 13.8 SEC

## 2021-09-02 NOTE — DISCHARGE NOTE NURSING/CASE MANAGEMENT/SOCIAL WORK - INFLUENZA IMMUNIZATION DATE (APPROXIMATE):
04-Nov-2020 Cyclosporine Counseling:  I discussed with the patient the risks of cyclosporine including but not limited to hypertension, gingival hyperplasia,myelosuppression, immunosuppression, liver damage, kidney damage, neurotoxicity, lymphoma, and serious infections. The patient understands that monitoring is required including baseline blood pressure, CBC, CMP, lipid panel and uric acid, and then 1-2 times monthly CMP and blood pressure.

## 2021-09-07 ENCOUNTER — APPOINTMENT (OUTPATIENT)
Dept: GASTROENTEROLOGY | Facility: CLINIC | Age: 76
End: 2021-09-07

## 2021-09-07 ENCOUNTER — OUTPATIENT (OUTPATIENT)
Dept: OUTPATIENT SERVICES | Facility: HOSPITAL | Age: 76
LOS: 1 days | End: 2021-09-07
Payer: MEDICARE

## 2021-09-07 VITALS
HEART RATE: 67 BPM | WEIGHT: 179.02 LBS | OXYGEN SATURATION: 97 % | RESPIRATION RATE: 17 BRPM | DIASTOLIC BLOOD PRESSURE: 79 MMHG | SYSTOLIC BLOOD PRESSURE: 132 MMHG | HEIGHT: 68 IN | TEMPERATURE: 98 F

## 2021-09-07 DIAGNOSIS — Z01.818 ENCOUNTER FOR OTHER PREPROCEDURAL EXAMINATION: ICD-10-CM

## 2021-09-07 DIAGNOSIS — C67.9 MALIGNANT NEOPLASM OF BLADDER, UNSPECIFIED: ICD-10-CM

## 2021-09-07 DIAGNOSIS — Z92.241 PERSONAL HISTORY OF SYSTEMIC STEROID THERAPY: Chronic | ICD-10-CM

## 2021-09-07 DIAGNOSIS — Z86.79 PERSONAL HISTORY OF OTHER DISEASES OF THE CIRCULATORY SYSTEM: ICD-10-CM

## 2021-09-07 DIAGNOSIS — I48.91 UNSPECIFIED ATRIAL FIBRILLATION: ICD-10-CM

## 2021-09-07 DIAGNOSIS — Z98.890 OTHER SPECIFIED POSTPROCEDURAL STATES: Chronic | ICD-10-CM

## 2021-09-07 DIAGNOSIS — M54.9 DORSALGIA, UNSPECIFIED: ICD-10-CM

## 2021-09-07 DIAGNOSIS — Z98.89 OTHER SPECIFIED POSTPROCEDURAL STATES: Chronic | ICD-10-CM

## 2021-09-07 LAB
ANION GAP SERPL CALC-SCNC: 12 MMOL/L — SIGNIFICANT CHANGE UP (ref 5–17)
BUN SERPL-MCNC: 17 MG/DL — SIGNIFICANT CHANGE UP (ref 7–23)
CALCIUM SERPL-MCNC: 9.4 MG/DL — SIGNIFICANT CHANGE UP (ref 8.4–10.5)
CHLORIDE SERPL-SCNC: 105 MMOL/L — SIGNIFICANT CHANGE UP (ref 96–108)
CO2 SERPL-SCNC: 21 MMOL/L — LOW (ref 22–31)
CREAT SERPL-MCNC: 0.84 MG/DL — SIGNIFICANT CHANGE UP (ref 0.5–1.3)
GLUCOSE SERPL-MCNC: 93 MG/DL — SIGNIFICANT CHANGE UP (ref 70–99)
HCT VFR BLD CALC: 44.2 % — SIGNIFICANT CHANGE UP (ref 39–50)
HGB BLD-MCNC: 14.9 G/DL — SIGNIFICANT CHANGE UP (ref 13–17)
MCHC RBC-ENTMCNC: 33.7 GM/DL — SIGNIFICANT CHANGE UP (ref 32–36)
MCHC RBC-ENTMCNC: 35.2 PG — HIGH (ref 27–34)
MCV RBC AUTO: 104.5 FL — HIGH (ref 80–100)
NRBC # BLD: 0 /100 WBCS — SIGNIFICANT CHANGE UP (ref 0–0)
PLATELET # BLD AUTO: 199 K/UL — SIGNIFICANT CHANGE UP (ref 150–400)
POTASSIUM SERPL-MCNC: 4.5 MMOL/L — SIGNIFICANT CHANGE UP (ref 3.5–5.3)
POTASSIUM SERPL-SCNC: 4.5 MMOL/L — SIGNIFICANT CHANGE UP (ref 3.5–5.3)
RBC # BLD: 4.23 M/UL — SIGNIFICANT CHANGE UP (ref 4.2–5.8)
RBC # FLD: 12.6 % — SIGNIFICANT CHANGE UP (ref 10.3–14.5)
SODIUM SERPL-SCNC: 138 MMOL/L — SIGNIFICANT CHANGE UP (ref 135–145)
WBC # BLD: 6.22 K/UL — SIGNIFICANT CHANGE UP (ref 3.8–10.5)
WBC # FLD AUTO: 6.22 K/UL — SIGNIFICANT CHANGE UP (ref 3.8–10.5)

## 2021-09-07 PROCEDURE — 85027 COMPLETE CBC AUTOMATED: CPT

## 2021-09-07 PROCEDURE — G0463: CPT

## 2021-09-07 PROCEDURE — 80048 BASIC METABOLIC PNL TOTAL CA: CPT

## 2021-09-07 PROCEDURE — 87086 URINE CULTURE/COLONY COUNT: CPT

## 2021-09-07 RX ORDER — SODIUM CHLORIDE 9 MG/ML
3 INJECTION INTRAMUSCULAR; INTRAVENOUS; SUBCUTANEOUS EVERY 8 HOURS
Refills: 0 | Status: DISCONTINUED | OUTPATIENT
Start: 2021-09-21 | End: 2021-10-05

## 2021-09-07 RX ORDER — ACETAMINOPHEN 500 MG
0 TABLET ORAL
Qty: 0 | Refills: 0 | DISCHARGE

## 2021-09-07 RX ORDER — LIDOCAINE HCL 20 MG/ML
0.2 VIAL (ML) INJECTION ONCE
Refills: 0 | Status: DISCONTINUED | OUTPATIENT
Start: 2021-09-21 | End: 2021-10-05

## 2021-09-07 NOTE — H&P PST ADULT - PROBLEM SELECTOR PLAN 2
last dose of Eliquis on 09/17/21 pm.  Instructed pt to get recommendations'  regarding Eliquis prior to surgery.  continue metoprolol

## 2021-09-07 NOTE — H&P PST ADULT - PROBLEM SELECTOR PLAN 1
scheduled for Cystoscopy, bladder biopsy and fulguration on 09/21/21  cbc,bmp, urine for culture and sensitivity sent  pre op instructions given

## 2021-09-07 NOTE — H&P PST ADULT - NSICDXPASTMEDICALHX_GEN_ALL_CORE_FT
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 follow up after an year    Chronic back pain on percocet prn and tylenol    Colon cancer     Epistaxis hosp 3/2021    Hepatitis C Pt was treated 7 years ago    History of sciatica leg pains    Lumbar disc disease     Malignant neoplasm of colon, unspecified part of colon s/p microsurgery at [facility]  no chemo/XRT in remission since 2012

## 2021-09-07 NOTE — H&P PST ADULT - LAST ECHOCARDIOGRAM
05/2021 -- NLVSF,Mild non-mobile atherosclerotic plaque in the aortic arch and descending thoracic aorta. evidence of a patent foramen ovale

## 2021-09-07 NOTE — H&P PST ADULT - NEGATIVE NEUROLOGICAL SYMPTOMS
no transient paralysis/no weakness/no generalized seizures/no focal seizures/no syncope/no tremors/no loss of sensation

## 2021-09-07 NOTE — H&P PST ADULT - HISTORY OF PRESENT ILLNESS
74 y/o male with PMH of aortic aneurysm,-- ct chest showed ascending aorta upto 4.2 unchanged on 04/2021, new onset Afib (02/2021)-- on Eliquis,-- s/p HENRRY/ DCCV  on 03/2021, recent pharmacological stress test( 05/2021 in [facility]) was normal colon and bladder cancer s/p chemo years ago in remission -- follow up cystoscopy 01/2021,  left prosthetic eyeball since birth, presented to [facility] for pre op evaluation prior to cystoscopy, bladder biopsy and fulguration on 09/21/21. Pt states this procedure is a follow up cystoscopy due to bladder cancer. Denies chest pain, dizziness, fever, chills, dyspnea.   Pt had history of epistaxis and near syncope admitted in ER and found to have new onset of Afib - on Eliquis since then- pending cardiac clearance.    Denies Recent travel, Exposure or Covid symptoms  Covid PCR test on 09/18/21 @ [facility] 74 y/o male with PMH of aortic aneurysm,-- ct chest showed ascending aorta upto 4.2cm unchanged on 04/2021, new onset Afib (02/2021)-- on Eliquis,-- s/p HENRRY/ DCCV  on 03/2021, recent pharmacological stress test( 05/2021 in [facility]) was normal colon and bladder cancer s/p chemo years ago in remission -- last cystoscopy 05/2021,  left prosthetic eyeball since birth, presented to [facility] for pre op evaluation prior to cystoscopy, bladder biopsy and fulguration on 09/21/21. Pt states this procedure is a follow up cystoscopy due to bladder cancer. Denies chest pain, dizziness, fever, chills, dyspnea.   Pt had history of epistaxis and near syncope admitted in ER and found to have new onset of Afib  02/2021- on Eliquis since then- pending cardiac clearance.    Denies Recent travel, Exposure or Covid symptoms  Covid PCR test on 09/18/21 @ [facility]

## 2021-09-07 NOTE — H&P PST ADULT - PROBLEM SELECTOR PLAN 3
continue pain medications as prescribed by provider/ on percocet since 2012 for lower back pain, sciatica and lower leg pain-- pt states he only takes percocet as needed, not daily, on Tylenol daily

## 2021-09-07 NOTE — H&P PST ADULT - NSICDXPASTSURGICALHX_GEN_ALL_CORE_FT
PAST SURGICAL HISTORY:  H/O eye surgery 1/2017    History of cardioversion 3/2021 and HENRRY    History of cystoscopy TURBT - multiple procedures ( last procedure 2/2016)  Cystoscopy and bladder Biopsy -  01/2021    History of hernia repair     Status post epidural steroid injection july/2021

## 2021-09-07 NOTE — H&P PST ADULT - RS GEN PE MLT RESP DETAILS PC
normal/airway patent/good air movement/respirations non-labored/clear to auscultation bilaterally/no intercostal retractions

## 2021-09-07 NOTE — H&P PST ADULT - NEUROLOGICAL DETAILS
alert and oriented x 3/responds to pain/responds to verbal commands/sensation intact/superficial reflexes intact

## 2021-09-07 NOTE — H&P PST ADULT - PROBLEM SELECTOR PLAN 4
last  stress echocardiogram was 05/2021-- normal study  ct chest showed ascending aorta upto 4.2 unchanged.  pending cardiac clearance on 09/08/21

## 2021-09-08 ENCOUNTER — APPOINTMENT (OUTPATIENT)
Dept: CARDIOLOGY | Facility: CLINIC | Age: 76
End: 2021-09-08
Payer: MEDICARE

## 2021-09-08 ENCOUNTER — NON-APPOINTMENT (OUTPATIENT)
Age: 76
End: 2021-09-08

## 2021-09-08 LAB
CULTURE RESULTS: SIGNIFICANT CHANGE UP
SPECIMEN SOURCE: SIGNIFICANT CHANGE UP

## 2021-09-08 PROCEDURE — 93000 ELECTROCARDIOGRAM COMPLETE: CPT

## 2021-09-08 PROCEDURE — 99214 OFFICE O/P EST MOD 30 MIN: CPT

## 2021-09-08 NOTE — HISTORY OF PRESENT ILLNESS
[FreeTextEntry1] : I was asked to see this delightful patient today for Pre-op Evaluation  prior to cystoscopy, bladder biopsy with   Dr. Caicedo at fax number [phone] .  The patient denies fever, cough, wheezing, sputum production, hemoptysis, dyspnea, congestion, diarrhea, constipation, nausea, vomiting, bright red blood per rectum, melena, angina, chest pain, palpitations, diaphoresis, PND, incontinence, frequency, urgency, dysuria, edema, joint pain, headache, weakness, numbness and dizziness. The date of the planned procedure is: 9/21/2021\par  \par \par The patient is also here for follow-up of atrial fibrillation and currently they feel well with an occasional episode of palpitations.  The patient states they are reliably taking the anticoagulation medicine and are not having any signs of bleeding they deny any dizziness headaches nosebleeds or black tarry stools. Pt had 30 day monitor in July w/ no episodes of A. Fib. Pt is in A. Fib on EKG today.\par The patient is here for follow-up of elevated cholesterol. Patient is currently tolerating medication and denies muscle pain, joint pain, back pain,  urinary changes , nausea, vomiting, abdominal pain or diarrhea. The patient is trying to follow a low cholesterol diet.

## 2021-09-09 ENCOUNTER — APPOINTMENT (OUTPATIENT)
Dept: ELECTROPHYSIOLOGY | Facility: CLINIC | Age: 76
End: 2021-09-09
Payer: MEDICARE

## 2021-09-09 VITALS
SYSTOLIC BLOOD PRESSURE: 133 MMHG | OXYGEN SATURATION: 96 % | HEIGHT: 69 IN | DIASTOLIC BLOOD PRESSURE: 81 MMHG | HEART RATE: 55 BPM | BODY MASS INDEX: 26.43 KG/M2

## 2021-09-09 VITALS — WEIGHT: 179 LBS | BODY MASS INDEX: 26.43 KG/M2

## 2021-09-09 LAB
ALBUMIN SERPL ELPH-MCNC: 4.2 G/DL
ALP BLD-CCNC: 68 U/L
ALT SERPL-CCNC: 14 U/L
ANION GAP SERPL CALC-SCNC: 12 MMOL/L
AST SERPL-CCNC: 19 U/L
BASOPHILS # BLD AUTO: 0.06 K/UL
BASOPHILS NFR BLD AUTO: 1 %
BILIRUB DIRECT SERPL-MCNC: 0.1 MG/DL
BILIRUB INDIRECT SERPL-MCNC: 0.3 MG/DL
BILIRUB SERPL-MCNC: 0.5 MG/DL
BUN SERPL-MCNC: 17 MG/DL
CALCIUM SERPL-MCNC: 9.5 MG/DL
CHLORIDE SERPL-SCNC: 107 MMOL/L
CHOLEST SERPL-MCNC: 161 MG/DL
CK SERPL-CCNC: 34 U/L
CO2 SERPL-SCNC: 20 MMOL/L
COVID-19 SPIKE DOMAIN ANTIBODY INTERPRETATION: POSITIVE
CREAT SERPL-MCNC: 0.76 MG/DL
EOSINOPHIL # BLD AUTO: 0.19 K/UL
EOSINOPHIL NFR BLD AUTO: 3.2 %
ESTIMATED AVERAGE GLUCOSE: 117 MG/DL
GLUCOSE SERPL-MCNC: 105 MG/DL
HBA1C MFR BLD HPLC: 5.7 %
HCT VFR BLD CALC: 44.7 %
HDLC SERPL-MCNC: 47 MG/DL
HGB BLD-MCNC: 15.3 G/DL
IMM GRANULOCYTES NFR BLD AUTO: 0.2 %
LDLC SERPL CALC-MCNC: 100 MG/DL
LDLC SERPL DIRECT ASSAY-MCNC: 102 MG/DL
LYMPHOCYTES # BLD AUTO: 2.14 K/UL
LYMPHOCYTES NFR BLD AUTO: 35.8 %
MAN DIFF?: NORMAL
MCHC RBC-ENTMCNC: 34.2 GM/DL
MCHC RBC-ENTMCNC: 35 PG
MCV RBC AUTO: 102.3 FL
MONOCYTES # BLD AUTO: 0.54 K/UL
MONOCYTES NFR BLD AUTO: 9 %
NEUTROPHILS # BLD AUTO: 3.04 K/UL
NEUTROPHILS NFR BLD AUTO: 50.8 %
NONHDLC SERPL-MCNC: 114 MG/DL
PLATELET # BLD AUTO: 199 K/UL
POTASSIUM SERPL-SCNC: 4.4 MMOL/L
PROT SERPL-MCNC: 7 G/DL
RBC # BLD: 4.37 M/UL
RBC # FLD: 12.9 %
SARS-COV-2 AB SERPL IA-ACNC: >250 U/ML
SODIUM SERPL-SCNC: 139 MMOL/L
TRIGL SERPL-MCNC: 70 MG/DL
WBC # FLD AUTO: 5.98 K/UL

## 2021-09-09 PROCEDURE — 99215 OFFICE O/P EST HI 40 MIN: CPT

## 2021-09-09 PROCEDURE — 93000 ELECTROCARDIOGRAM COMPLETE: CPT

## 2021-09-10 NOTE — H&P PST ADULT - ACTIVITY
Housework,  installs track lighting, walks daily Crescentic Advancement Flap Text: The defect edges were debeveled with a #15 scalpel blade.  Given the location of the defect and the proximity to free margins a crescentic advancement flap was deemed most appropriate.  Using a sterile surgical marker, the appropriate advancement flap was drawn incorporating the defect and placing the expected incisions within the relaxed skin tension lines where possible.    The area thus outlined was incised deep to adipose tissue with a #15 scalpel blade.  The skin margins were undermined to an appropriate distance in all directions utilizing iris scissors.

## 2021-09-18 ENCOUNTER — OUTPATIENT (OUTPATIENT)
Dept: OUTPATIENT SERVICES | Facility: HOSPITAL | Age: 76
LOS: 1 days | End: 2021-09-18
Payer: MEDICARE

## 2021-09-18 DIAGNOSIS — Z11.52 ENCOUNTER FOR SCREENING FOR COVID-19: ICD-10-CM

## 2021-09-18 DIAGNOSIS — Z98.890 OTHER SPECIFIED POSTPROCEDURAL STATES: Chronic | ICD-10-CM

## 2021-09-18 DIAGNOSIS — Z98.89 OTHER SPECIFIED POSTPROCEDURAL STATES: Chronic | ICD-10-CM

## 2021-09-18 DIAGNOSIS — Z92.241 PERSONAL HISTORY OF SYSTEMIC STEROID THERAPY: Chronic | ICD-10-CM

## 2021-09-18 LAB — SARS-COV-2 RNA SPEC QL NAA+PROBE: SIGNIFICANT CHANGE UP

## 2021-09-18 PROCEDURE — U0005: CPT

## 2021-09-18 PROCEDURE — U0003: CPT

## 2021-09-18 PROCEDURE — C9803: CPT

## 2021-09-20 ENCOUNTER — TRANSCRIPTION ENCOUNTER (OUTPATIENT)
Age: 76
End: 2021-09-20

## 2021-09-21 ENCOUNTER — RESULT REVIEW (OUTPATIENT)
Age: 76
End: 2021-09-21

## 2021-09-21 ENCOUNTER — OUTPATIENT (OUTPATIENT)
Dept: OUTPATIENT SERVICES | Facility: HOSPITAL | Age: 76
LOS: 1 days | End: 2021-09-21
Payer: MEDICARE

## 2021-09-21 ENCOUNTER — APPOINTMENT (OUTPATIENT)
Dept: UROLOGY | Facility: HOSPITAL | Age: 76
End: 2021-09-21

## 2021-09-21 VITALS
TEMPERATURE: 97 F | HEIGHT: 68 IN | HEART RATE: 47 BPM | WEIGHT: 179.02 LBS | RESPIRATION RATE: 16 BRPM | DIASTOLIC BLOOD PRESSURE: 71 MMHG | SYSTOLIC BLOOD PRESSURE: 109 MMHG | OXYGEN SATURATION: 99 %

## 2021-09-21 VITALS
SYSTOLIC BLOOD PRESSURE: 104 MMHG | TEMPERATURE: 97 F | RESPIRATION RATE: 18 BRPM | DIASTOLIC BLOOD PRESSURE: 55 MMHG | OXYGEN SATURATION: 98 % | HEART RATE: 58 BPM

## 2021-09-21 DIAGNOSIS — Z98.890 OTHER SPECIFIED POSTPROCEDURAL STATES: Chronic | ICD-10-CM

## 2021-09-21 DIAGNOSIS — Z92.241 PERSONAL HISTORY OF SYSTEMIC STEROID THERAPY: Chronic | ICD-10-CM

## 2021-09-21 DIAGNOSIS — C67.9 MALIGNANT NEOPLASM OF BLADDER, UNSPECIFIED: ICD-10-CM

## 2021-09-21 DIAGNOSIS — Z98.89 OTHER SPECIFIED POSTPROCEDURAL STATES: Chronic | ICD-10-CM

## 2021-09-21 PROCEDURE — 88112 CYTOPATH CELL ENHANCE TECH: CPT | Mod: 26

## 2021-09-21 PROCEDURE — 52214 CYSTOSCOPY AND TREATMENT: CPT

## 2021-09-21 PROCEDURE — 52234 CYSTOSCOPY AND TREATMENT: CPT

## 2021-09-21 PROCEDURE — 88112 CYTOPATH CELL ENHANCE TECH: CPT

## 2021-09-21 PROCEDURE — ZZZZZ: CPT

## 2021-09-21 PROCEDURE — 88307 TISSUE EXAM BY PATHOLOGIST: CPT

## 2021-09-21 PROCEDURE — 88307 TISSUE EXAM BY PATHOLOGIST: CPT | Mod: 26

## 2021-09-21 RX ORDER — ONDANSETRON 8 MG/1
4 TABLET, FILM COATED ORAL ONCE
Refills: 0 | Status: DISCONTINUED | OUTPATIENT
Start: 2021-09-21 | End: 2021-10-05

## 2021-09-21 RX ORDER — SODIUM CHLORIDE 9 MG/ML
1000 INJECTION, SOLUTION INTRAVENOUS
Refills: 0 | Status: DISCONTINUED | OUTPATIENT
Start: 2021-09-21 | End: 2021-10-05

## 2021-09-21 RX ORDER — CEFAZOLIN SODIUM 1 G
2000 VIAL (EA) INJECTION ONCE
Refills: 0 | Status: COMPLETED | OUTPATIENT
Start: 2021-09-21 | End: 2021-09-21

## 2021-09-21 NOTE — ASU PATIENT PROFILE, ADULT - NSICDXPASTMEDICALHX_GEN_ALL_CORE_FT
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 follow up after an year    Chronic back pain on percocet prn and tylenol    Colon cancer     Epistaxis hosp 3/2021    Hepatitis C Pt was treated 7 years ago    History of sciatica leg pains    Lumbar disc disease     Malignant neoplasm of colon, unspecified part of colon s/p microsurgery at [facility]  no chemo/XRT in remission since 2012

## 2021-09-21 NOTE — ASU DISCHARGE PLAN (ADULT/PEDIATRIC) - CARE PROVIDER_API CALL
Todd Caicedo)  Urology  [address], [place] [address]  Phone: [phone]  Fax: [phone]  Follow Up Time:

## 2021-09-21 NOTE — ASU PATIENT PROFILE, ADULT - VISION (WITH CORRECTIVE LENSES IF THE PATIENT USUALLY WEARS THEM):
Normal vision: sees adequately in most situations; can see medication labels, newsprint blind left eye/Partially impaired: cannot see medication labels or newsprint, but can see obstacles in path, and the surrounding layout; can count fingers at arm's length

## 2021-09-21 NOTE — ASU DISCHARGE PLAN (ADULT/PEDIATRIC) - NURSING INSTRUCTIONS
******************************************************************************************  Next dose of TYLENOL may be taken at or after _______2:50______ PM if needed for pain. DO NOT take any additional products containing TYLENOL or ACETAMINOPHEN, such as VICODIN, PERCOCET, NORCO, EXCEDRIN, and any over-the-counter cold medications until this time. DO NOT CONSUME MORE THAN 5715-1393 MG of TYLENOL (acetaminophen) in a 24-hour period.   ******************************************************************************************

## 2021-09-21 NOTE — ASU DISCHARGE PLAN (ADULT/PEDIATRIC) - ASU DC SPECIAL INSTRUCTIONSFT
Restart Eliquis in two days     Call the office in two weeks for follow up appointment and pathology results

## 2021-09-22 ENCOUNTER — OUTPATIENT (OUTPATIENT)
Dept: OUTPATIENT SERVICES | Facility: HOSPITAL | Age: 76
LOS: 1 days | End: 2021-09-22

## 2021-09-22 VITALS
RESPIRATION RATE: 16 BRPM | WEIGHT: 184.09 LBS | TEMPERATURE: 98 F | SYSTOLIC BLOOD PRESSURE: 134 MMHG | DIASTOLIC BLOOD PRESSURE: 90 MMHG | HEART RATE: 54 BPM | OXYGEN SATURATION: 98 % | HEIGHT: 65 IN

## 2021-09-22 DIAGNOSIS — I48.91 UNSPECIFIED ATRIAL FIBRILLATION: ICD-10-CM

## 2021-09-22 DIAGNOSIS — Z98.890 OTHER SPECIFIED POSTPROCEDURAL STATES: Chronic | ICD-10-CM

## 2021-09-22 DIAGNOSIS — Z92.241 PERSONAL HISTORY OF SYSTEMIC STEROID THERAPY: Chronic | ICD-10-CM

## 2021-09-22 DIAGNOSIS — Z98.89 OTHER SPECIFIED POSTPROCEDURAL STATES: Chronic | ICD-10-CM

## 2021-09-22 LAB
BLD GP AB SCN SERPL QL: NEGATIVE — SIGNIFICANT CHANGE UP
RH IG SCN BLD-IMP: POSITIVE — SIGNIFICANT CHANGE UP

## 2021-09-22 RX ORDER — PREGABALIN 225 MG/1
1 CAPSULE ORAL
Qty: 0 | Refills: 0 | DISCHARGE

## 2021-09-22 RX ORDER — APIXABAN 2.5 MG/1
1 TABLET, FILM COATED ORAL
Qty: 0 | Refills: 0 | DISCHARGE

## 2021-09-22 RX ORDER — FOLIC ACID 0.8 MG
1 TABLET ORAL
Qty: 0 | Refills: 0 | DISCHARGE

## 2021-09-22 RX ORDER — ACETAMINOPHEN 500 MG
4 TABLET ORAL
Qty: 0 | Refills: 0 | DISCHARGE

## 2021-09-22 RX ORDER — SODIUM CHLORIDE 9 MG/ML
3 INJECTION INTRAMUSCULAR; INTRAVENOUS; SUBCUTANEOUS EVERY 8 HOURS
Refills: 0 | Status: DISCONTINUED | OUTPATIENT
Start: 2021-10-05 | End: 2021-10-19

## 2021-09-22 RX ORDER — ACETAMINOPHEN WITH CODEINE 300MG-30MG
1 TABLET ORAL
Qty: 0 | Refills: 0 | DISCHARGE

## 2021-09-22 RX ORDER — FLUTICASONE PROPIONATE 50 MCG
2 SPRAY, SUSPENSION NASAL
Qty: 0 | Refills: 0 | DISCHARGE

## 2021-09-22 NOTE — H&P PST ADULT - PROBLEM SELECTOR PLAN 1
preop for AF ablation with biosense on 10/5/21  pt instructed to follow Dr. Morgan's instructions, ptt verbalized understanding  chlorhexidine wash provided  pt is scheduled for COVID testing preop preop for AF ablation with biosense on 10/5/21  pt instructed to follow Dr. Morgan's preop instructions, pt verbalized understanding  chlorhexidine wash provided  pt is scheduled for COVID testing preop

## 2021-09-22 NOTE — H&P PST ADULT - NSICDXPASTSURGICALHX_GEN_ALL_CORE_FT
PAST SURGICAL HISTORY:  H/O eye surgery 1/2017    History of cardioversion 3/2021 and HENRRY    History of cystoscopy TURBT - multiple procedures ( last procedure 2/2016)  Cystoscopy and bladder Biopsy -  01/2021  cystoscopy 9/2021    History of hernia repair     Status post epidural steroid injection july/2021

## 2021-09-22 NOTE — H&P PST ADULT - NEGATIVE ENMT SYMPTOMS
no sinus symptoms/no nasal congestion/no throat pain/no dysphagia no ear pain/no tinnitus/no vertigo/no sinus symptoms/no nasal congestion/no throat pain/no dysphagia

## 2021-09-22 NOTE — H&P PST ADULT - HISTORY OF PRESENT ILLNESS
76 y/o male with atrial fibrillation on Eliquis, aortic aneurysm and h/o colon and bladder cancer s/p chemo presents to PST preop for AF ablation with biosense. pt s/p HENRRY/DCCV March 2021. pt is now back and afib and presents today for ablation.         74 y/o male with atrial fibrillation on Eliquis, aortic aneurysm and h/o colon and bladder cancer s/p chemo presents to PST preop for AF ablation with biosense. pt s/p HENRRY/DCCV March 2021. pt says a recent follow up revealed he is back in afib and presents today preop for ablation.  pt denies symptoms of chest pain, palpitations or SOB at present.

## 2021-09-22 NOTE — H&P PST ADULT - NSICDXPASTMEDICALHX_GEN_ALL_CORE_FT
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 follow up after an year    Chronic back pain on percocet prn and tylenol    Colon cancer     Epistaxis hosp 3/2021    Hepatitis C Pt was treated 7 years ago    History of sciatica leg pains    Lumbar disc disease     Malignant neoplasm of colon, unspecified part of colon s/p microsurgery at [facility]  no chemo/XRT in remission since 2012

## 2021-09-22 NOTE — H&P PST ADULT - RS GEN PE MLT RESP DETAILS PC
normal/airway patent/good air movement/respirations non-labored/clear to auscultation bilaterally/no intercostal retractions normal/airway patent/breath sounds equal/good air movement/respirations non-labored/clear to auscultation bilaterally/no intercostal retractions/no wheezes

## 2021-09-22 NOTE — H&P PST ADULT - MUSCULOSKELETAL
detailed exam decreased ROM/decreased ROM due to pain details… ROM intact/no joint swelling/no calf tenderness

## 2021-09-23 LAB — NON-GYNECOLOGICAL CYTOLOGY STUDY: SIGNIFICANT CHANGE UP

## 2021-09-26 ENCOUNTER — NON-APPOINTMENT (OUTPATIENT)
Age: 76
End: 2021-09-26

## 2021-09-26 NOTE — DISCUSSION/SUMMARY
[FreeTextEntry1] : Urban Miranda is a 74y/o man with Hx of aortic aneurysm (4.5cm), colon and bladder ca s/p chemo, and recent hospitalization for epistaxis and newly found afib s/p HENRRY/DCCV on 3/2/2021 who presents today for routine f/u. \par \par Impression:\par \par 1. Paroxysmal afib: s/p HENRRY/DCCV on 3/2/2021. EKG performed today to assess for presence of recurrent afib and reveals NSR. Occasional episodes of AF with palpitations. Discussed medical management v. ablation. Risks, benefits, and alternatives to procedure discussed at length. Risks including that of bleeding, infection, stroke, and cardiac tamponade discussed and he verbalizes understanding of all.\par \par \par 2. Bladder ca: known bladder ca with increased risk of bleeding. Concern about long term use of anticoagulation. Recommend undergoing placement of ILR for long term monitoring of afib and to allow for safe discontinuation of AC in future. Risks, benefits, and alternatives discussed.\par \par 3. Epistaxis: hospitalized in past for epistaxis, no recurrent bleeding since that time. No bleeding or issue with Eliquis. Concern about long term use of anticoagulation. Recommend undergoing placement of ILR for long term monitoring of afib and to allow for safe discontinuation of AC in future. Risks, benefits, and alternatives discussed.\par \par Plan for AF ablation

## 2021-09-26 NOTE — HISTORY OF PRESENT ILLNESS
[FreeTextEntry1] : Urban Miranda is a 74y/o man with Hx of aortic aneurysm (4.5cm), colon and bladder ca s/p chemo, and recent hospitalization for epistaxis and newly found afib s/p HENRRY/DCCV on 3/2/2021 who presents today for routine f/u. Admits having recurrent episodes of AF with symptoms of palpitations and SOB. Denies chest pain, syncope or near syncope. Remains on Eliquis without issues bleeding.

## 2021-09-26 NOTE — CARDIOLOGY SUMMARY
[de-identified] : 2/2021, 1. Mitral annular calcification, otherwise normal mitral valve. Mild mitral regurgitation. 2. Calcified trileaflet aortic valve with normal opening. Mild aortic regurgitation. 3. Normal left ventricular internal dimensions and wall thicknesses. 4. Normal left ventricular systolic function. No segmental wall motion abnormalities. 5. Normal right ventricular size and function. \par 3/2/2021: Mitral annular calcification,  Moderate mitral \par regurgitation.  Vena contracta width about  0.4 cm. Calcified trileaflet aortic \par valve  Minimal aortic regurgitation. Mild non-mobile atherosclerotic \par plaque in the aortic arch and descending thoracic aorta. evidence of a \par patent foramen ovale

## 2021-09-30 ENCOUNTER — NON-APPOINTMENT (OUTPATIENT)
Age: 76
End: 2021-09-30

## 2021-10-01 ENCOUNTER — APPOINTMENT (OUTPATIENT)
Dept: CARDIOLOGY | Facility: CLINIC | Age: 76
End: 2021-10-01
Payer: MEDICARE

## 2021-10-01 ENCOUNTER — NON-APPOINTMENT (OUTPATIENT)
Age: 76
End: 2021-10-01

## 2021-10-01 VITALS
HEART RATE: 52 BPM | TEMPERATURE: 98.7 F | SYSTOLIC BLOOD PRESSURE: 118 MMHG | BODY MASS INDEX: 26.51 KG/M2 | OXYGEN SATURATION: 97 % | DIASTOLIC BLOOD PRESSURE: 80 MMHG | HEIGHT: 69 IN | WEIGHT: 179 LBS

## 2021-10-01 DIAGNOSIS — I49.5 SICK SINUS SYNDROME: ICD-10-CM

## 2021-10-01 DIAGNOSIS — Z91.81 HISTORY OF FALLING: ICD-10-CM

## 2021-10-01 PROCEDURE — 93306 TTE W/DOPPLER COMPLETE: CPT

## 2021-10-01 PROCEDURE — 93000 ELECTROCARDIOGRAM COMPLETE: CPT

## 2021-10-01 RX ORDER — METOPROLOL SUCCINATE 25 MG/1
25 TABLET, EXTENDED RELEASE ORAL
Qty: 90 | Refills: 0 | Status: DISCONTINUED | COMMUNITY
Start: 2020-10-29 | End: 2021-10-01

## 2021-10-01 NOTE — HISTORY OF PRESENT ILLNESS
[FreeTextEntry1] : Urban is a 75 y.o male with a PMHx of Colon ca, Bladder ca, TAA, Afib on OAC, HLD who presents for acute visit.\par \par 9/30/2021 s/p fall outside at [facility], believes tripped over something. Denies dizziness, spinning sensation, dehydration. Was taken home by police. Was advised by nephew to go to ED, went to [place]. BB was reduced to Toprol XL 25 mg daily. Labs drawn, nL.\par \par CXR negative. CT Brain w/o, CT cervical w/o negative for acute injury. \par \par Did have HENRRY/DCCV 3/2021, sees Dr. Morgan. Scheduled for ablation 10/5/2021.\par \par 5/17/2021 [place]\par 4/1/2021 CT Chest\par \par s/p TTE today, LVEF 55-60%.\par \par Denies pain, bleeding, chest pain, palpitations, diaphoresis, vision changes, HA, dizziness, syncope, cough, wheezing, SOB/CURRIE, fever, chills, infection or exposure to those with infection, swelling, travel.

## 2021-10-02 ENCOUNTER — APPOINTMENT (OUTPATIENT)
Dept: DISASTER EMERGENCY | Facility: CLINIC | Age: 76
End: 2021-10-02

## 2021-10-02 LAB — SARS-COV-2 N GENE NPH QL NAA+PROBE: NOT DETECTED

## 2021-10-05 ENCOUNTER — OUTPATIENT (OUTPATIENT)
Dept: OUTPATIENT SERVICES | Facility: HOSPITAL | Age: 76
LOS: 1 days | Discharge: HOME | End: 2021-10-05
Payer: MEDICARE

## 2021-10-05 DIAGNOSIS — I48.91 UNSPECIFIED ATRIAL FIBRILLATION: ICD-10-CM

## 2021-10-05 DIAGNOSIS — Z98.89 OTHER SPECIFIED POSTPROCEDURAL STATES: Chronic | ICD-10-CM

## 2021-10-05 DIAGNOSIS — Z98.890 OTHER SPECIFIED POSTPROCEDURAL STATES: Chronic | ICD-10-CM

## 2021-10-05 DIAGNOSIS — Z92.241 PERSONAL HISTORY OF SYSTEMIC STEROID THERAPY: Chronic | ICD-10-CM

## 2021-10-05 PROCEDURE — 93613 INTRACARDIAC EPHYS 3D MAPG: CPT

## 2021-10-05 PROCEDURE — 93010 ELECTROCARDIOGRAM REPORT: CPT

## 2021-10-05 PROCEDURE — 93662 INTRACARDIAC ECG (ICE): CPT | Mod: 26

## 2021-10-05 PROCEDURE — 93656 COMPRE EP EVAL ABLTJ ATR FIB: CPT

## 2021-10-05 RX ORDER — OXYCODONE HYDROCHLORIDE 5 MG/1
1 TABLET ORAL
Qty: 0 | Refills: 0 | DISCHARGE

## 2021-10-05 RX ORDER — PANTOPRAZOLE SODIUM 20 MG/1
1 TABLET, DELAYED RELEASE ORAL
Qty: 30 | Refills: 0
Start: 2021-10-05 | End: 2021-11-03

## 2021-10-05 RX ORDER — APIXABAN 2.5 MG/1
1 TABLET, FILM COATED ORAL
Qty: 0 | Refills: 0 | DISCHARGE

## 2021-10-05 RX ORDER — METOPROLOL TARTRATE 50 MG
1 TABLET ORAL
Qty: 0 | Refills: 0 | DISCHARGE

## 2021-10-05 RX ORDER — SODIUM CHLORIDE 9 MG/ML
1000 INJECTION, SOLUTION INTRAVENOUS
Refills: 0 | Status: DISCONTINUED | OUTPATIENT
Start: 2021-10-05 | End: 2021-10-05

## 2021-10-05 NOTE — CHART NOTE - NSCHARTNOTEFT_GEN_A_CORE
75 y.o. male presents today for elective A. Fibrillation.  see H&P from [employer] and PST in patient's chart.  The patient denies chest pain, SOB, palpitations, dizziness, presyncope, syncope,  headache, visual disturbances, CVA, PE, DVT, TONY, abdominal pain, N/V/D/C, hematochezia, melena, dysuria, hematuria, fever, chills.  Medications reviewed.

## 2021-10-05 NOTE — CHART NOTE - NSCHARTNOTEFT_GEN_A_CORE
Type of procedure: PVI  Licensed independent practitioner: Victoria Morgan MD  Assistant: None  Description of procedure: After informed consent was obtained, the patient was brought to the Electrophysiology laboratory in the fasting state, and was prepped and draped in the usual sterile fashion. The patient was electively intubated by members of the anesthesia department, who provided sedation throughout the case. In addition an esophageal temperature probe was placed in the esophagus to allow dynamic temperate monitoring during ablation. The patient was under uninterrupted apixaban therapy.  Sheaths were placed as described in the procedure report, and catheters were advanced into the heart under fluoroscopic guidance without complications. Baseline intra-cardiac echocardiography (ICE) demonstrated the following: The LV size and functions were normal. There was no pericardial effusion at baseline  IV Heparin bolus was given followed by continuous drip to maintain the -400s throughout the case.  The left atrium was entered under fluoroscopic and ICE guidance by a single transseptal approach using a medium curve [place] sheath. There were no complications.  A PentaRay Tyrone F curve catheter and a 3.5 mm irrigated-tip Navistar ThermoCool D/F-curve ablation catheter were used for mapping and ablation. A 3D anatomy of the LA was performed using Carto 3 V7.  We first isolated the left pulmonary veins in an antral approach with demonstration of entrance block. No ruddy lesions were required for isolation of the left PVs. We then similarly isolated the right pulmonary veins in an antral approach. The patient was externally cardioverted to sinus rhythm with 200J. Lesions were required in the septal ruddy near the RIPV, as well as in the posterior ruddy, near the RSPV, for isolation. Bilateral PVs showed evidence of entrance and exit block after pacing maneuvers. With pacing inside the PV, there was local PV capture without capture of the LA in the LSPV, LIPV and RSPV. Esophageal temperatures xiomara from a baseline of 35.8 degrees Celsius to a peak of 36.8 degrees Celsius.  The pulmonary veins remained isolated for >30 minutes, without evidence of acute reconnection. Burst pacing was performed from the proximal CS bipole down to 200ms without induction of any atrial arrhythmias.  As such, heparin was stopped and the sheaths were pulled back to the right atrium. Repeat ICE imaging revealed no pericardial effusion.  All catheters were removed from the body and sheaths were left in place for removal once ACT declines to below 200 seconds.   A total of 80mg of protamine was given to reverse the Heparin effect.    The patient tolerated the procedure well and was successfully extubated and transferred to the [facility] for further monitoring. There were no complications.    Findings of procedure: Successful isolation of the pulmonary veins in an antral circumferential approach. No acute reconnections.  Estimated blood loss: <5cc  Specimen removed: N/A  Preoperative Dx: Afib  Postoperative Dx: Afib  Complications: None  Anesthesia type: General Type of procedure: PVI  Licensed independent practitioner: Victoria Morgan MD  Assistant: None  Description of procedure: After informed consent was obtained, the patient was brought to the Electrophysiology laboratory in the fasting state, and was prepped and draped in the usual sterile fashion. The patient was electively intubated by members of the anesthesia department, who provided sedation throughout the case. In addition an esophageal temperature probe was placed in the esophagus to allow dynamic temperate monitoring during ablation. The patient was under uninterrupted apixaban therapy.  Sheaths were placed as described in the procedure report, and catheters were advanced into the heart under fluoroscopic guidance without complications. Baseline intra-cardiac echocardiography (ICE) demonstrated the following: The LV size and functions were normal. There was no pericardial effusion at baseline  IV Heparin bolus was given followed by continuous drip to maintain the -400s throughout the case.  The left atrium was entered under fluoroscopic and ICE guidance by a single transseptal approach using a medium curve [place] sheath. There were no complications.  A PentaRay Tyrone F curve catheter and a 3.5 mm irrigated-tip Navistar ThermoCool D/F-curve ablation catheter were used for mapping and ablation. A 3D anatomy of the LA was performed using Carto 3 V7.  We first isolated the left pulmonary veins in an antral approach with demonstration of entrance block. No ruddy lesions were required for isolation of the left PVs. We then similarly isolated the right pulmonary veins in an antral approach. The patient was externally cardioverted to sinus rhythm with 200J. Lesions were required in the septal ruddy near the RIPV, as well as in the posterior ruddy, near the RSPV, for isolation. Bilateral PVs showed evidence of entrance and exit block after pacing maneuvers. With pacing inside the PV, there was local PV capture without capture of the LA in the LSPV, LIPV and RSPV. Esophageal temperatures xiomara from a baseline of 35.8 degrees Celsius to a peak of 36.8 degrees Celsius.  The pulmonary veins remained isolated for >30 minutes, without evidence of acute reconnection. Burst pacing was performed from the proximal CS bipole down to 200ms without induction of any atrial arrhythmias.  As such, heparin was stopped and the sheaths were pulled back to the right atrium. Repeat ICE imaging revealed no pericardial effusion.  All catheters were removed from the body and sheaths were left in place for removal once ACT declines to below 200 seconds.   A total of 80mg of protamine was given to reverse the Heparin effect.    The patient tolerated the procedure well and was successfully extubated and transferred to the [facility] for further monitoring. There were no complications.  During the procedure, a [employer] rep was present to manage a complex mapping system.    Findings of procedure: Successful isolation of the pulmonary veins in an antral circumferential approach. No acute reconnections.  Estimated blood loss: <5cc  Specimen removed: N/A  Preoperative Dx: Afib  Postoperative Dx: Afib  Complications: None  Anesthesia type: General

## 2021-10-05 NOTE — CHART NOTE - NSCHARTNOTEFT_GEN_A_CORE
The patient is s/p A. Fib. Ablation.  VSS  As per Dr. Morgan, Restart Eliquis tomorrow, 10/6/21 in the morning.   Stop Metoprolol.  Start Protonix 40 mg 1 tab/day for 1 month. First dose to be started tomorrow, 10/6/21 in the morning  Please follow up with Dr. Morgan on 11/8/21 at 1:30 PM  d/c home 5 hrs post procedure if VSS and access site -stable

## 2021-10-06 NOTE — PACU DISCHARGE NOTE - NSPTMEETSDISCHCRITERIADT_GEN_A_CORE
Answers for HPI/ROS submitted by the patient on 9/29/2021  What is the primary reason for your visit?: Back Pain  Chronicity: chronic  Onset: more than 1 month ago  Frequency: constantly  Progression since onset: rapidly worsening  Pain location: lumbar spine  Pain quality: shooting  Radiates to: does not radiate  Pain - numeric: 10/10  Pain is: the same all the time  Aggravated by: standing  Stiffness is present: all day  bowel incontinence: Yes  weakness: Yes  Risk factors: obesity    Subjective Chief complaint is back pain  Earl Marc is a 75 y.o. male.     History of Present Illness Earl is here today for back pain.  He had x-rays done in July.  This showed some arthritic change and some lipping bone spurs.  In some places it almost look like he had a bamboo spine.  He did do some physical therapy which seemed to help.  Then he was exposed to Covid and had to stop going for 2 weeks.  He tried doing the exercises at home but it did not seem to make his much difference as when he went to physical therapy.  He already is taking some tramadol and lidocaine patches.  Additionally he has complained of some shaking or tremulousness.  He does work on model planes.  When he is trying to do delicate work his fingers are shaking and his hands are shaking.  He is not having any resting tremor that he is aware of.  During the course of the interview and exam I did not see any resting parkinsonian type tremor.  He is complaining of some shortness of breath.  This does not seem to occur during the day when he is moving.  He reports that it is mostly at night.  I did review a CT angio of the chest.  It really did not show anything that looks like emphysematous changes.  He has not been a cigarette smoker.    The following portions of the patient's history were reviewed and updated as appropriate: allergies, current medications, past family history, past medical history, past social history, past surgical history and  problem list.    Review of Systems   Respiratory: Positive for shortness of breath. Negative for chest tightness.    Cardiovascular: Negative for chest pain and leg swelling.   Musculoskeletal: Positive for back pain.   Neurological: Positive for weakness.       Objective   Physical Exam  Vitals and nursing note reviewed.   Cardiovascular:      Rate and Rhythm: Normal rate and regular rhythm.      Heart sounds: No murmur heard.   No friction rub. No gallop.    Pulmonary:      Effort: Pulmonary effort is normal.      Breath sounds: No wheezing, rhonchi or rales.   Musculoskeletal:      Right lower leg: No edema.      Left lower leg: No edema.   Neurological:      Mental Status: He is alert.      Comments: He has a mild intention tremor.           Assessment/Plan   Diagnoses and all orders for this visit:    1. Chronic low back pain without sciatica, unspecified back pain laterality (Primary)  -     Ambulatory Referral to Pain Management    2. Tremor, essential  -     TSH+Free T4  -     Comprehensive Metabolic Panel  -     Magnesium    3. Shortness of breath  -     CBC & Differential    Other orders  -     metoprolol tartrate (LOPRESSOR) 25 MG tablet; Take 1 tablet by mouth 2 (Two) Times a Day.  Dispense: 180 tablet; Refill: 1  -     pantoprazole (PROTONIX) 20 MG EC tablet; Take 1 tablet by mouth Daily.  Dispense: 90 tablet; Refill: 1  -     lidocaine (LIDODERM) 5 %; Place 1 patch on the skin as directed by provider Daily. Remove & Discard patch within 12 hours or as directed by MD  Dispense: 90 patch; Refill: 1    Earl is here today for several reasons.  For his back pain I am going to refer him to pain management and did encourage him to go back to [facility] for his physical therapy since he was getting a little bit of benefit there.  He seems to have an essential tremor.  My guess is this is going to be an age-related tremor possibly due to medications which might include tramadol or Lyrica.  He is having some  shortness of breath.  His exam is unremarkable for heart failure.  His lungs are clear.  I am going to check a CBC.            26-Mar-2019 13:18

## 2021-10-07 LAB — SURGICAL PATHOLOGY STUDY: SIGNIFICANT CHANGE UP

## 2021-10-12 ENCOUNTER — APPOINTMENT (OUTPATIENT)
Dept: CARDIOLOGY | Facility: CLINIC | Age: 76
End: 2021-10-12

## 2021-10-21 ENCOUNTER — APPOINTMENT (OUTPATIENT)
Dept: OTOLARYNGOLOGY | Facility: CLINIC | Age: 76
End: 2021-10-21
Payer: MEDICARE

## 2021-10-21 VITALS
HEIGHT: 68 IN | HEART RATE: 67 BPM | WEIGHT: 182 LBS | SYSTOLIC BLOOD PRESSURE: 141 MMHG | BODY MASS INDEX: 27.58 KG/M2 | DIASTOLIC BLOOD PRESSURE: 71 MMHG | TEMPERATURE: 97.3 F

## 2021-10-21 DIAGNOSIS — H92.01 OTALGIA, RIGHT EAR: ICD-10-CM

## 2021-10-21 PROCEDURE — 99203 OFFICE O/P NEW LOW 30 MIN: CPT

## 2021-10-21 NOTE — PHYSICAL EXAM
[Midline] : trachea located in midline position [Normal] : no rashes [de-identified] : severe impaction-right ear [de-identified] : left wnl

## 2021-10-21 NOTE — HISTORY OF PRESENT ILLNESS
[de-identified] : 76 yo male\par Patient states he went to get hearing aids, as he was getting fitted for the right ear he felt severe right sided ear pain. He did get his hearing aids but not hearing well from it. Complains of nasal congestion, cant breath from the left nostril. Pt has no ear pain, ear drainage, tinnitus, vertigo, nasal discharge, epistaxis, sinus infections, facial pain, facial pressure, throat pain, dysphagia or fevers\par \par  [Hearing Loss] : hearing loss [Otalgia] : otalgia [Presbycusis] : presbycusis [Early Onset Hearing Loss] : no early onset hearing loss [Stroke] : no stroke [Allergic Rhinitis] : no allergic rhinitis [Adenoidectomy] : no adenoidectomy [Allergies] : no allergies [Asthma] : no asthma [Hyperthyroidism] : no hyperthyroidism [Sialadenitis] : no sialadenitis [Hodgkin Disease] : no hodgkin disease [Non-Hodgkin Lymphoma] : no non-hodgkin lymphoma [None] : No risk factors have been identified. [Graves Disease] : no graves disease [Thyroid Cancer] : no thyroid cancer

## 2021-10-21 NOTE — END OF VISIT
[FreeTextEntry3] : I personally saw and examined TAJ HUBBARD in detail. I spoke to BERTO Dunn regarding the assessment and plan of care. I reviewed the above assessment and plan of care, and agree. I have made changes in changes in the body of the note where appropriate.I personally reviewed the HPI, PMH, FH, SH, ROS and medications/allergies. I have spoken to BERTO Dunn regarding the history and have personally determined the assessment and plan of care, and documented this myself. I was present and participated in all key portions of the encounter and all procedures noted above. I have made changes in the body of the note where appropriate.\par \par Attesting Faculty: See Attending Signature Below \par \par \par  [Time Spent: ___ minutes] : I have spent [unfilled] minutes of time on the encounter.

## 2021-10-21 NOTE — ASSESSMENT
[FreeTextEntry1] : Patient with hx of hearing loss complains of right sided ear pain after being fitting for hearing aids \par \par b/l wax:\par -pt was uncomfortable with office cleaning\par -Rx: Debrox, to help loosen wax \par \par f/u 1 week or  prn

## 2021-10-28 ENCOUNTER — APPOINTMENT (OUTPATIENT)
Dept: OTOLARYNGOLOGY | Facility: CLINIC | Age: 76
End: 2021-10-28
Payer: MEDICARE

## 2021-10-28 ENCOUNTER — NON-APPOINTMENT (OUTPATIENT)
Age: 76
End: 2021-10-28

## 2021-10-28 VITALS
HEART RATE: 75 BPM | BODY MASS INDEX: 27.58 KG/M2 | HEIGHT: 68 IN | DIASTOLIC BLOOD PRESSURE: 86 MMHG | TEMPERATURE: 97.9 F | SYSTOLIC BLOOD PRESSURE: 134 MMHG | WEIGHT: 182 LBS

## 2021-10-28 DIAGNOSIS — R04.0 EPISTAXIS: ICD-10-CM

## 2021-10-28 DIAGNOSIS — H90.3 SENSORINEURAL HEARING LOSS, BILATERAL: ICD-10-CM

## 2021-10-28 DIAGNOSIS — H61.23 IMPACTED CERUMEN, BILATERAL: ICD-10-CM

## 2021-10-28 PROCEDURE — 92567 TYMPANOMETRY: CPT

## 2021-10-28 PROCEDURE — 92557 COMPREHENSIVE HEARING TEST: CPT

## 2021-10-28 PROCEDURE — 99213 OFFICE O/P EST LOW 20 MIN: CPT | Mod: 25

## 2021-10-28 RX ORDER — APIXABAN 5 MG/1
5 TABLET, FILM COATED ORAL
Qty: 180 | Refills: 0 | Status: DISCONTINUED | COMMUNITY
Start: 2021-03-17 | End: 2021-10-28

## 2021-10-28 NOTE — ASSESSMENT
[FreeTextEntry1] : Patient with hx of hearing loss presents for ear cleaning, states he used Debrox daily \par -ear are clean without wax today \par -Hearing Test performed to evaluate the extent of hearing loss and to explain pt's symptoms\par bilateral sensorineural hearing loss-cleared for hearing aids\par \par f/u  prn

## 2021-10-28 NOTE — END OF VISIT
[Time Spent: ___ minutes] : I have spent [unfilled] minutes of time on the encounter. [FreeTextEntry3] : I personally saw and examined TAJ HUBBARD in detail. I spoke to BERTO Dunn regarding the assessment and plan of care. I reviewed the above assessment and plan of care, and agree. I have made changes in changes in the body of the note where appropriate.I personally reviewed the HPI, PMH, FH, SH, ROS and medications/allergies. I have spoken to BERTO Dunn regarding the history and have personally determined the assessment and plan of care, and documented this myself. I was present and participated in all key portions of the encounter and all procedures noted above. I have made changes in the body of the note where appropriate.\par \par Attesting Faculty: See Attending Signature Below \par \par \par

## 2021-10-28 NOTE — DATA REVIEWED
[de-identified] : Hearing Test performed to evaluate the extent of hearing loss and  to explain pt's symptoms\par today's hearing test was personally reviewed and revealed\par Type A tymps. Mild to moderately-severe SNHL bilaterally.

## 2021-11-08 ENCOUNTER — APPOINTMENT (OUTPATIENT)
Dept: CARDIOLOGY | Facility: CLINIC | Age: 76
End: 2021-11-08
Payer: MEDICARE

## 2021-11-08 ENCOUNTER — APPOINTMENT (OUTPATIENT)
Dept: ELECTROPHYSIOLOGY | Facility: CLINIC | Age: 76
End: 2021-11-08
Payer: MEDICARE

## 2021-11-08 ENCOUNTER — NON-APPOINTMENT (OUTPATIENT)
Age: 76
End: 2021-11-08

## 2021-11-08 VITALS
TEMPERATURE: 98 F | HEIGHT: 68 IN | HEIGHT: 68 IN | BODY MASS INDEX: 27.58 KG/M2 | OXYGEN SATURATION: 99 % | SYSTOLIC BLOOD PRESSURE: 140 MMHG | DIASTOLIC BLOOD PRESSURE: 85 MMHG | WEIGHT: 182 LBS | HEART RATE: 60 BPM

## 2021-11-08 PROCEDURE — 93000 ELECTROCARDIOGRAM COMPLETE: CPT

## 2021-11-08 PROCEDURE — 99214 OFFICE O/P EST MOD 30 MIN: CPT

## 2021-11-08 PROCEDURE — 99214 OFFICE O/P EST MOD 30 MIN: CPT | Mod: 25

## 2021-11-08 RX ORDER — RIVAROXABAN 20 MG/1
20 TABLET, FILM COATED ORAL
Qty: 30 | Refills: 3 | Status: DISCONTINUED | COMMUNITY
Start: 2021-10-28 | End: 2021-11-08

## 2021-11-08 NOTE — DISCUSSION/SUMMARY
[FreeTextEntry1] : Impression:\par \par 1. Paroxysmal afib: s/p HENRRY/DCCV on 3/2/2021 and now s/p PVI ablation on 10/5/2021. Saw Cardiologist this morning and EKG reviewed and reveals NSR. Pending epidural later this week, may hold Eliquis prior. \par \par 2. HTN: Encouraged heart healthy diet, sodium restriction, and weight loss. Continue regular f/u with Cardiologist for further HTN management.\par \par 3. HLD: resume regular f/u with Cardiologist for routine lipid monitoring and management.\par \par Resume regular f/u with Cardiologist and may RTO as needed or if any new or worsening symptoms occur.

## 2021-11-08 NOTE — HISTORY OF PRESENT ILLNESS
[FreeTextEntry1] : Urban Miranda is a 77y/o man with Hx of aortic aneurysm (4.5cm), colon and bladder ca s/p chemo, and recent hospitalization for epistaxis and newly found afib s/p HENRRY/DCCV on 3/2/2021 and now s/p PVI ablation on 10/5/2021 who presents today for routine f/u. Admits doing well post ablation with no issues or complaints. Denies chest pain, palpitations, SOB, syncope or near syncope. Right groin without pain, swelling, or bruising.

## 2021-11-08 NOTE — CARDIOLOGY SUMMARY
[de-identified] : 2/2021, 1. Mitral annular calcification, otherwise normal mitral valve. Mild mitral regurgitation. 2. Calcified trileaflet aortic valve with normal opening. Mild aortic regurgitation. 3. Normal left ventricular internal dimensions and wall thicknesses. 4. Normal left ventricular systolic function. No segmental wall motion abnormalities. 5. Normal right ventricular size and function. \par 3/2/2021: Mitral annular calcification, Moderate mitral \par regurgitation. Vena contracta width about 0.4 cm. Calcified trileaflet aortic \par valve Minimal aortic regurgitation. Mild non-mobile atherosclerotic \par plaque in the aortic arch and descending thoracic aorta. evidence of a \par patent foramen ovale

## 2021-11-09 NOTE — HISTORY OF PRESENT ILLNESS
[FreeTextEntry1] : The patient presents for evaluation of high blood pressure. Patient is currently tolerating the current  antihypertensive regime and they deny headaches, stiff neck, visual changes, pedal Edema or PND. They also are here for follow-up of elevated cholesterol. Patient is currently tolerating medication and and does not complain of new  muscle pain, joint pain, back pain,urinary changes ,nausea, vomiting, abdominal pain or diarrhea. The patient is trying to follow a low cholesterol diet. \par \par \par The patient is also here for follow-up of atrial fibrillation and currently they feel well with an occasional episode of palpitations.  The patient states they are reliably taking the anticoagulation medicine and are not having any signs of bleeding they deny any dizziness headaches nosebleeds or black tarry stools. \par \par Pt is s/p HENRRY/DCCV March 2021, had ablation in October. Sinus denia today. pt is seeing Dr. Morgan today\par \par Pt w/ hx of bladder CA, scheduled for cystoscopy on 1/18/2022\par \par Pt is scheduled for epidural injection on Friday for lower back pain. \par \par pt is here for follow up of TAA they have been taking medication as prescribed  and  blood pressure have been observed in the a reasonable range, their are no reports of SSCP , back pain or syncope. \par \par Pt states that he is unable to walk short distances without severe pain in both of his legs that starts in his ankles and radiates to the calves. Pt states he takes Tylenol at least three times a day to relieve the pain. Pt has had frequent falls because of this issue. Denies any swelling, numbness, tingling, redness. Pt states he is unable to stand more than five minutes without severe pain. Has an appointment with a neurosurgeon next week.

## 2021-11-13 NOTE — ASU PATIENT PROFILE, ADULT - PRO INTERPRETER NEED 2
Mesenteric Ischemia Mesenteric Ischemia Mesenteric Ischemia Mesenteric Ischemia Mesenteric Ischemia Mesenteric Ischemia Mesenteric Ischemia Mesenteric Ischemia Mesenteric Ischemia Mesenteric Ischemia Mesenteric Ischemia Mesenteric Ischemia Mesenteric Ischemia Mesenteric Ischemia Mesenteric Ischemia Mesenteric Ischemia Mesenteric Ischemia Mesenteric Ischemia Mesenteric Ischemia Mesenteric Ischemia Mesenteric Ischemia Mesenteric Ischemia Mesenteric Ischemia Mesenteric Ischemia Mesenteric Ischemia Mesenteric Ischemia Mesenteric Ischemia Mesenteric Ischemia Mesenteric Ischemia Mesenteric Ischemia Mesenteric Ischemia Mesenteric Ischemia Mesenteric Ischemia Mesenteric Ischemia Mesenteric Ischemia Mesenteric Ischemia Mesenteric Ischemia Mesenteric Ischemia Mesenteric Ischemia Mesenteric Ischemia Mesenteric Ischemia Mesenteric Ischemia Mesenteric Ischemia Mesenteric Ischemia Mesenteric Ischemia Mesenteric Ischemia Mesenteric Ischemia Mesenteric Ischemia Mesenteric Ischemia Mesenteric Ischemia Mesenteric Ischemia Mesenteric Ischemia Mesenteric Ischemia Mesenteric Ischemia Mesenteric Ischemia Mesenteric Ischemia Mesenteric Ischemia Mesenteric Ischemia Mesenteric Ischemia Mesenteric Ischemia Mesenteric Ischemia Mesenteric Ischemia Mesenteric Ischemia Mesenteric Ischemia Mesenteric Ischemia Mesenteric Ischemia Mesenteric Ischemia Mesenteric Ischemia Mesenteric Ischemia Mesenteric Ischemia Mesenteric Ischemia Mesenteric Ischemia Mesenteric Ischemia Mesenteric Ischemia Mesenteric Ischemia Mesenteric Ischemia Mesenteric Ischemia Mesenteric Ischemia Mesenteric Ischemia Mesenteric Ischemia Mesenteric Ischemia Mesenteric Ischemia Mesenteric Ischemia Mesenteric Ischemia Mesenteric Ischemia Mesenteric Ischemia Mesenteric Ischemia Mesenteric Ischemia Mesenteric Ischemia Mesenteric Ischemia Mesenteric Ischemia Mesenteric Ischemia Mesenteric Ischemia Mesenteric Ischemia Mesenteric Ischemia Mesenteric Ischemia Mesenteric Ischemia Mesenteric Ischemia Mesenteric Ischemia Mesenteric Ischemia Mesenteric Ischemia Mesenteric Ischemia Mesenteric Ischemia Mesenteric Ischemia Mesenteric Ischemia Mesenteric Ischemia Mesenteric Ischemia Mesenteric Ischemia Mesenteric Ischemia Mesenteric Ischemia Mesenteric Ischemia Mesenteric Ischemia Mesenteric Ischemia Mesenteric Ischemia Mesenteric Ischemia Mesenteric Ischemia Mesenteric Ischemia Mesenteric Ischemia Mesenteric Ischemia Mesenteric Ischemia Mesenteric Ischemia Mesenteric Ischemia Mesenteric Ischemia Mesenteric Ischemia Mesenteric Ischemia Mesenteric Ischemia Mesenteric Ischemia Mesenteric Ischemia Mesenteric Ischemia Mesenteric Ischemia Mesenteric Ischemia Mesenteric Ischemia Mesenteric Ischemia Mesenteric Ischemia Mesenteric Ischemia Mesenteric Ischemia Mesenteric Ischemia Mesenteric Ischemia Mesenteric Ischemia Mesenteric Ischemia Mesenteric Ischemia Mesenteric Ischemia Mesenteric Ischemia Mesenteric Ischemia Mesenteric Ischemia Mesenteric Ischemia Mesenteric Ischemia Mesenteric Ischemia Mesenteric Ischemia Mesenteric Ischemia Mesenteric Ischemia English Mesenteric Ischemia

## 2021-11-19 ENCOUNTER — APPOINTMENT (OUTPATIENT)
Dept: CARDIOLOGY | Facility: CLINIC | Age: 76
End: 2021-11-19

## 2021-11-24 ENCOUNTER — NON-APPOINTMENT (OUTPATIENT)
Age: 76
End: 2021-11-24

## 2021-11-30 ENCOUNTER — APPOINTMENT (OUTPATIENT)
Dept: OTOLARYNGOLOGY | Facility: CLINIC | Age: 76
End: 2021-11-30

## 2021-12-09 ENCOUNTER — NON-APPOINTMENT (OUTPATIENT)
Age: 76
End: 2021-12-09

## 2021-12-09 ENCOUNTER — APPOINTMENT (OUTPATIENT)
Dept: CARDIOLOGY | Facility: CLINIC | Age: 76
End: 2021-12-09
Payer: MEDICARE

## 2021-12-09 VITALS
HEIGHT: 68 IN | TEMPERATURE: 98 F | HEART RATE: 58 BPM | OXYGEN SATURATION: 98 % | BODY MASS INDEX: 27.58 KG/M2 | SYSTOLIC BLOOD PRESSURE: 118 MMHG | WEIGHT: 182 LBS | DIASTOLIC BLOOD PRESSURE: 76 MMHG

## 2021-12-09 DIAGNOSIS — Z00.00 ENCOUNTER FOR GENERAL ADULT MEDICAL EXAMINATION W/OUT ABNORMAL FINDINGS: ICD-10-CM

## 2021-12-09 DIAGNOSIS — M79.669 PAIN IN UNSPECIFIED LOWER LEG: ICD-10-CM

## 2021-12-09 PROCEDURE — 99214 OFFICE O/P EST MOD 30 MIN: CPT

## 2021-12-09 PROCEDURE — 93000 ELECTROCARDIOGRAM COMPLETE: CPT

## 2021-12-13 NOTE — HISTORY OF PRESENT ILLNESS
[FreeTextEntry1] : The patient presents for evaluation of high blood pressure. Patient is currently tolerating the current  antihypertensive regime and they deny headaches, stiff neck, visual changes, pedal Edema or PND. They also are here for follow-up of elevated cholesterol. Patient is currently tolerating medication and and does not complain of new  muscle pain, joint pain, back pain,urinary changes ,nausea, vomiting, abdominal pain or diarrhea. The patient is trying to follow a low cholesterol diet. \par \par \par The patient is also here for follow-up of atrial fibrillation and currently they feel well with an occasional episode of palpitations.  The patient states they are reliably taking the anticoagulation medicine and are not having any signs of bleeding they deny any dizziness headaches nosebleeds or black tarry stools \par \par Pt is s/p HENRRY/DCCV March 2021, had ablation in October. Pt is followed by Dr. Morgan\par \par Pt w/ hx of bladder CA, scheduled for cystoscopy 1/18/2022\par \par pt is here for follow up of TAA they have been taking medication as prescribed  and  blood pressure have been observed in the a reasonable range, their are no reports of SSCP , back pain or syncope. \par \par Pt presents for follow up, states that he is unable to walk more than six minutes without pain/weakness in his legs. Pt states the pain starts in his ankles and radiates to his calves. Pt has an appointment w/ Dr. Santana next month. Denies any swelling, numbness, tingling, redness. \par \par Pt had recent epidural for chronic lower back pain. \par \par Pt states today that they had both covid 19 vaccine . pt had the COVID-19 vaccine without major side effects. The patient did experience mild fatigue headache and arm soreness. The patient denied any fever over 100.5. pt denies any recent sore throat, fever, chills loss of taste or smell. \par \par

## 2022-01-04 ENCOUNTER — OUTPATIENT (OUTPATIENT)
Dept: OUTPATIENT SERVICES | Facility: HOSPITAL | Age: 77
LOS: 1 days | End: 2022-01-04
Payer: MEDICARE

## 2022-01-04 VITALS
DIASTOLIC BLOOD PRESSURE: 82 MMHG | HEART RATE: 94 BPM | OXYGEN SATURATION: 98 % | WEIGHT: 179.9 LBS | SYSTOLIC BLOOD PRESSURE: 126 MMHG | TEMPERATURE: 98 F | RESPIRATION RATE: 16 BRPM | HEIGHT: 68 IN

## 2022-01-04 DIAGNOSIS — Z98.890 OTHER SPECIFIED POSTPROCEDURAL STATES: Chronic | ICD-10-CM

## 2022-01-04 DIAGNOSIS — C67.9 MALIGNANT NEOPLASM OF BLADDER, UNSPECIFIED: ICD-10-CM

## 2022-01-04 DIAGNOSIS — Z92.241 PERSONAL HISTORY OF SYSTEMIC STEROID THERAPY: Chronic | ICD-10-CM

## 2022-01-04 DIAGNOSIS — Z98.89 OTHER SPECIFIED POSTPROCEDURAL STATES: Chronic | ICD-10-CM

## 2022-01-04 DIAGNOSIS — Z01.818 ENCOUNTER FOR OTHER PREPROCEDURAL EXAMINATION: ICD-10-CM

## 2022-01-04 LAB
ANION GAP SERPL CALC-SCNC: 12 MMOL/L — SIGNIFICANT CHANGE UP (ref 5–17)
BUN SERPL-MCNC: 15 MG/DL — SIGNIFICANT CHANGE UP (ref 7–23)
CALCIUM SERPL-MCNC: 9.6 MG/DL — SIGNIFICANT CHANGE UP (ref 8.4–10.5)
CHLORIDE SERPL-SCNC: 103 MMOL/L — SIGNIFICANT CHANGE UP (ref 96–108)
CO2 SERPL-SCNC: 24 MMOL/L — SIGNIFICANT CHANGE UP (ref 22–31)
CREAT SERPL-MCNC: 0.74 MG/DL — SIGNIFICANT CHANGE UP (ref 0.5–1.3)
GLUCOSE SERPL-MCNC: 101 MG/DL — HIGH (ref 70–99)
HCT VFR BLD CALC: 47.9 % — SIGNIFICANT CHANGE UP (ref 39–50)
HGB BLD-MCNC: 15.9 G/DL — SIGNIFICANT CHANGE UP (ref 13–17)
MCHC RBC-ENTMCNC: 33.2 GM/DL — SIGNIFICANT CHANGE UP (ref 32–36)
MCHC RBC-ENTMCNC: 34.5 PG — HIGH (ref 27–34)
MCV RBC AUTO: 103.9 FL — HIGH (ref 80–100)
NRBC # BLD: 0 /100 WBCS — SIGNIFICANT CHANGE UP (ref 0–0)
PLATELET # BLD AUTO: 201 K/UL — SIGNIFICANT CHANGE UP (ref 150–400)
POTASSIUM SERPL-MCNC: 4.6 MMOL/L — SIGNIFICANT CHANGE UP (ref 3.5–5.3)
POTASSIUM SERPL-SCNC: 4.6 MMOL/L — SIGNIFICANT CHANGE UP (ref 3.5–5.3)
RBC # BLD: 4.61 M/UL — SIGNIFICANT CHANGE UP (ref 4.2–5.8)
RBC # FLD: 12.1 % — SIGNIFICANT CHANGE UP (ref 10.3–14.5)
SODIUM SERPL-SCNC: 139 MMOL/L — SIGNIFICANT CHANGE UP (ref 135–145)
WBC # BLD: 6.17 K/UL — SIGNIFICANT CHANGE UP (ref 3.8–10.5)
WBC # FLD AUTO: 6.17 K/UL — SIGNIFICANT CHANGE UP (ref 3.8–10.5)

## 2022-01-04 PROCEDURE — 85027 COMPLETE CBC AUTOMATED: CPT

## 2022-01-04 PROCEDURE — G0463: CPT

## 2022-01-04 PROCEDURE — 80048 BASIC METABOLIC PNL TOTAL CA: CPT

## 2022-01-04 PROCEDURE — 87086 URINE CULTURE/COLONY COUNT: CPT

## 2022-01-04 PROCEDURE — 36415 COLL VENOUS BLD VENIPUNCTURE: CPT

## 2022-01-04 RX ORDER — SODIUM CHLORIDE 9 MG/ML
3 INJECTION INTRAMUSCULAR; INTRAVENOUS; SUBCUTANEOUS EVERY 8 HOURS
Refills: 0 | Status: DISCONTINUED | OUTPATIENT
Start: 2022-01-18 | End: 2022-02-01

## 2022-01-04 NOTE — H&P PST ADULT - HEART RATE (BEATS/MIN)
lmom awaiting a call back  Need to schedule a new start Botox appointment with Dr Koki Oro  When patient calls back please transfer patient to me to schedule  I also need to let her know that the Botox will be processed through her [employer] not [employer]  94

## 2022-01-04 NOTE — H&P PST ADULT - FALL HARM RISK - UNIVERSAL INTERVENTIONS
Bed in lowest position, wheels locked, appropriate side rails in place/Call bell, personal items and telephone in reach/Instruct patient to call for assistance before getting out of bed or chair/Non-slip footwear when patient is out of bed/[place] to call system/Physically safe environment - no spills, clutter or unnecessary equipment/Purposeful Proactive Rounding/Room/bathroom lighting operational, light cord in reach

## 2022-01-04 NOTE — H&P PST ADULT - PROBLEM SELECTOR PLAN 1
Cystoscopy  Bladder Biopsy  -cbc, bmp, urine culture done at [facility]  -medical and cardiology evaluation  -recommended last dose of eliquis 1/14 evening dose, patient to confirm with cardiologist   -preop instructions  -preop covid swab 1/15 @ [facility]

## 2022-01-04 NOTE — H&P PST ADULT - NSICDXPASTMEDICALHX_GEN_ALL_CORE_FT
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 follow up after an year    Chronic back pain on percocet prn and tylenol    Colon cancer     Epistaxis hospitalized 3/2021    Hepatitis C Pt was treated 7 years ago    History of sciatica leg pains    Lumbar disc disease     Malignant neoplasm of colon, unspecified part of colon s/p microsurgery at [facility]  no chemo/XRT in remission since 2012

## 2022-01-04 NOTE — H&P PST ADULT - OTHER CARE PROVIDERS
Cardiologist--Dr. Arnold Ward, [facility], [phone]// Neurologist--Dr. Elvis Lemus, [facility], [phone]//  Vascular--Mariana Zuniga, [facility], [phone]

## 2022-01-04 NOTE — H&P PST ADULT - HISTORY OF PRESENT ILLNESS
76 year old male with PMH of Aortic aneurysm, 4.2cm unchanged, (last Chest CT 04/2021), A-fib on Eliquis, s/p HENRRY/ DCCV on 03/2021 and Ablation 10/2021, (stress test  05/2021- normal study), Colon and Bladder Cancer s/p chemo years ago in remission -- last cystoscopy 05/2021,  presented to [facility] for pre op evaluation prior to follow-up Cystoscopy, Bladder biopsy ON 1/18/22. He denies chest pain, dizziness, fever, chills, dyspnea, gross hematuria, dysuria.    preop covid screen 1/15 @ [facility]

## 2022-01-05 LAB
CULTURE RESULTS: SIGNIFICANT CHANGE UP
SPECIMEN SOURCE: SIGNIFICANT CHANGE UP

## 2022-01-06 ENCOUNTER — APPOINTMENT (OUTPATIENT)
Dept: VASCULAR SURGERY | Facility: CLINIC | Age: 77
End: 2022-01-06
Payer: MEDICARE

## 2022-01-06 VITALS
TEMPERATURE: 97.5 F | SYSTOLIC BLOOD PRESSURE: 114 MMHG | HEART RATE: 86 BPM | HEIGHT: 68 IN | DIASTOLIC BLOOD PRESSURE: 82 MMHG | BODY MASS INDEX: 27.43 KG/M2 | WEIGHT: 181 LBS

## 2022-01-06 DIAGNOSIS — M54.16 RADICULOPATHY, LUMBAR REGION: ICD-10-CM

## 2022-01-06 PROCEDURE — 99204 OFFICE O/P NEW MOD 45 MIN: CPT

## 2022-01-06 PROCEDURE — 93923 UPR/LXTR ART STDY 3+ LVLS: CPT

## 2022-01-10 ENCOUNTER — APPOINTMENT (OUTPATIENT)
Dept: CARDIOLOGY | Facility: CLINIC | Age: 77
End: 2022-01-10
Payer: MEDICARE

## 2022-01-10 ENCOUNTER — NON-APPOINTMENT (OUTPATIENT)
Age: 77
End: 2022-01-10

## 2022-01-10 VITALS
DIASTOLIC BLOOD PRESSURE: 80 MMHG | TEMPERATURE: 98.7 F | WEIGHT: 181 LBS | OXYGEN SATURATION: 97 % | SYSTOLIC BLOOD PRESSURE: 125 MMHG | HEIGHT: 68 IN | BODY MASS INDEX: 27.43 KG/M2 | HEART RATE: 82 BPM

## 2022-01-10 PROCEDURE — 99214 OFFICE O/P EST MOD 30 MIN: CPT

## 2022-01-10 PROCEDURE — 93000 ELECTROCARDIOGRAM COMPLETE: CPT

## 2022-01-12 NOTE — HISTORY OF PRESENT ILLNESS
[FreeTextEntry1] : The patient presents for evaluation of high blood pressure. Patient is currently tolerating the current  antihypertensive regime and they deny headaches, stiff neck, visual changes, pedal Edema or PND. They also are here for follow-up of elevated cholesterol. Patient is currently tolerating medication and and does not complain of new  muscle pain, joint pain, back pain,urinary changes ,nausea, vomiting, abdominal pain or diarrhea. The patient is trying to follow a low cholesterol diet. \par \par \par The patient is also here for follow-up of atrial fibrillation and currently they feel well with an occasional episode of palpitations.  The patient states they are reliably taking the anticoagulation medicine and are not having any signs of bleeding they deny any dizziness headaches nosebleeds or black tarry stools \par \par Pt is s/p HENRRY/DCCV March 2021, had ablation in October. Pt is followed by Dr. Morgan\par \par Pt w/ hx of bladder CA, scheduled for cystoscopy 1/18/2022\par \par pt is here for follow up of TAA they have been taking medication as prescribed  and  blood pressure have been observed in the a reasonable range, their are no reports of SSCP , back pain or syncope. \par \par Pt presents for follow up, states that he is unable to walk more than six minutes without pain/weakness in his legs. Pt states the pain starts in his ankles and radiates to his calves. Pt is F/U with Dr Santana. Denies any swelling, numbness, tingling, redness. Pt is contemplating getting nerve ablation surgery for pain relief.\par \par Pt had recent epidural for chronic lower back pain. \par \par Pt states today that they had both covid 19 vaccine . pt had the COVID-19 vaccine without major side effects. The patient did experience mild fatigue headache and arm soreness. The patient denied any fever over 100.5. pt denies any recent sore throat, fever, chills loss of taste or smell. \par \par I was asked to see this delightful patient today for Pre-op Evaluation  prior to  _urologic surgery with Dr. Caicedo____  with   Dr. Nix_____  at fax number   ___[phone]____  .  The patient denies fever, cough, wheezing, sputum production, hemoptysis, dyspnea, congestion, diarrhea, constipation, nausea, vomiting, bright red blood per rectum, melena, angina, chest pain, palpitations, diaphoresis, PND, incontinence, frequency, urgency, dysuria, edema, joint pain, headache, weakness, numbness and dizziness. The date of the planned procedure is: __01/18/2022_________\par \par \par \par

## 2022-01-13 ENCOUNTER — NON-APPOINTMENT (OUTPATIENT)
Age: 77
End: 2022-01-13

## 2022-01-15 ENCOUNTER — OUTPATIENT (OUTPATIENT)
Dept: OUTPATIENT SERVICES | Facility: HOSPITAL | Age: 77
LOS: 1 days | End: 2022-01-15
Payer: MEDICARE

## 2022-01-15 DIAGNOSIS — Z98.890 OTHER SPECIFIED POSTPROCEDURAL STATES: Chronic | ICD-10-CM

## 2022-01-15 DIAGNOSIS — Z92.241 PERSONAL HISTORY OF SYSTEMIC STEROID THERAPY: Chronic | ICD-10-CM

## 2022-01-15 DIAGNOSIS — Z98.89 OTHER SPECIFIED POSTPROCEDURAL STATES: Chronic | ICD-10-CM

## 2022-01-15 DIAGNOSIS — Z11.52 ENCOUNTER FOR SCREENING FOR COVID-19: ICD-10-CM

## 2022-01-15 LAB — SARS-COV-2 RNA SPEC QL NAA+PROBE: SIGNIFICANT CHANGE UP

## 2022-01-15 PROCEDURE — C9803: CPT

## 2022-01-15 PROCEDURE — U0005: CPT

## 2022-01-15 PROCEDURE — U0003: CPT

## 2022-01-17 ENCOUNTER — TRANSCRIPTION ENCOUNTER (OUTPATIENT)
Age: 77
End: 2022-01-17

## 2022-01-18 ENCOUNTER — OUTPATIENT (OUTPATIENT)
Dept: OUTPATIENT SERVICES | Facility: HOSPITAL | Age: 77
LOS: 1 days | Discharge: HOME | End: 2022-01-18
Payer: MEDICARE

## 2022-01-18 ENCOUNTER — APPOINTMENT (OUTPATIENT)
Dept: UROLOGY | Facility: HOSPITAL | Age: 77
End: 2022-01-18

## 2022-01-18 ENCOUNTER — RESULT REVIEW (OUTPATIENT)
Age: 77
End: 2022-01-18

## 2022-01-18 VITALS
SYSTOLIC BLOOD PRESSURE: 158 MMHG | HEIGHT: 68 IN | HEART RATE: 65 BPM | OXYGEN SATURATION: 99 % | WEIGHT: 179.9 LBS | TEMPERATURE: 98 F | RESPIRATION RATE: 16 BRPM | DIASTOLIC BLOOD PRESSURE: 78 MMHG

## 2022-01-18 VITALS
OXYGEN SATURATION: 96 % | TEMPERATURE: 97 F | DIASTOLIC BLOOD PRESSURE: 57 MMHG | SYSTOLIC BLOOD PRESSURE: 123 MMHG | RESPIRATION RATE: 16 BRPM | HEART RATE: 77 BPM

## 2022-01-18 DIAGNOSIS — Z98.890 OTHER SPECIFIED POSTPROCEDURAL STATES: Chronic | ICD-10-CM

## 2022-01-18 DIAGNOSIS — Z98.89 OTHER SPECIFIED POSTPROCEDURAL STATES: Chronic | ICD-10-CM

## 2022-01-18 DIAGNOSIS — C67.9 MALIGNANT NEOPLASM OF BLADDER, UNSPECIFIED: ICD-10-CM

## 2022-01-18 DIAGNOSIS — Z92.241 PERSONAL HISTORY OF SYSTEMIC STEROID THERAPY: Chronic | ICD-10-CM

## 2022-01-18 PROCEDURE — 52234 CYSTOSCOPY AND TREATMENT: CPT

## 2022-01-18 PROCEDURE — 88305 TISSUE EXAM BY PATHOLOGIST: CPT

## 2022-01-18 PROCEDURE — 52224 CYSTOSCOPY AND TREATMENT: CPT

## 2022-01-18 PROCEDURE — 88305 TISSUE EXAM BY PATHOLOGIST: CPT | Mod: 26

## 2022-01-18 RX ORDER — LIDOCAINE HCL 20 MG/ML
0.2 VIAL (ML) INJECTION ONCE
Refills: 0 | Status: COMPLETED | OUTPATIENT
Start: 2022-01-18 | End: 2022-01-18

## 2022-01-18 RX ORDER — ONDANSETRON 8 MG/1
4 TABLET, FILM COATED ORAL ONCE
Refills: 0 | Status: DISCONTINUED | OUTPATIENT
Start: 2022-01-18 | End: 2022-02-01

## 2022-01-18 RX ORDER — ACETAMINOPHEN 500 MG
1000 TABLET ORAL ONCE
Refills: 0 | Status: DISCONTINUED | OUTPATIENT
Start: 2022-01-18 | End: 2022-02-01

## 2022-01-18 RX ORDER — OXYCODONE HYDROCHLORIDE 5 MG/1
5 TABLET ORAL ONCE
Refills: 0 | Status: DISCONTINUED | OUTPATIENT
Start: 2022-01-18 | End: 2022-01-18

## 2022-01-18 RX ORDER — CEFAZOLIN SODIUM 1 G
2000 VIAL (EA) INJECTION ONCE
Refills: 0 | Status: COMPLETED | OUTPATIENT
Start: 2022-01-18 | End: 2022-01-18

## 2022-01-18 RX ORDER — SODIUM CHLORIDE 9 MG/ML
1000 INJECTION, SOLUTION INTRAVENOUS
Refills: 0 | Status: DISCONTINUED | OUTPATIENT
Start: 2022-01-18 | End: 2022-02-01

## 2022-01-18 RX ORDER — OXYCODONE HYDROCHLORIDE 5 MG/1
1 TABLET ORAL
Qty: 0 | Refills: 0 | DISCHARGE

## 2022-01-18 RX ADMIN — SODIUM CHLORIDE 3 MILLILITER(S): 9 INJECTION INTRAMUSCULAR; INTRAVENOUS; SUBCUTANEOUS at 07:08

## 2022-01-18 NOTE — ASU DISCHARGE PLAN (ADULT/PEDIATRIC) - NS MD DC FALL RISK RISK
For information on Fall & Injury Prevention, visit: [email or web address] OR  [email or web address] OR  https://www.cdc.gov/steadi/patient.html

## 2022-01-18 NOTE — ASU DISCHARGE PLAN (ADULT/PEDIATRIC) - NURSING INSTRUCTIONS
Tylenol/acetaminophen------AND/OR------Motrin/ibuprofen  as needed for pain/discomfort.  NEXT DOSE:  Tylenol  OK @ 10:00am this morning, if needed.  Follow up with MD as requested; call office for appointment.

## 2022-01-18 NOTE — ASU PATIENT PROFILE, ADULT - NSICDXPASTMEDICALHX_GEN_ALL_CORE_FT
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 follow up after an year    Chronic back pain on percocet prn and tylenol    Colon cancer     Epistaxis hospitalized 3/2021    Hepatitis C Pt was treated 7 years ago    History of sciatica leg pains    Lumbar disc disease     Malignant neoplasm of colon, unspecified part of colon s/p microsurgery at [facility]  no chemo/XRT in remission since 2012

## 2022-01-18 NOTE — ASU DISCHARGE PLAN (ADULT/PEDIATRIC) - CARE PROVIDER_API CALL
Todd Caicedo)  Urology  [address] [facility] [address]  [place] [address]  Phone: [phone]  Fax: [phone]  Follow Up Time: 2 weeks

## 2022-01-18 NOTE — ASU PATIENT PROFILE, ADULT - FALL HARM RISK - RISK INTERVENTIONS

## 2022-01-18 NOTE — ASU DISCHARGE PLAN (ADULT/PEDIATRIC) - ASU DC SPECIAL INSTRUCTIONSFT
Discharge Instructions: TURP/TURBT    •	General: It is common to have blood in the urine after your procedure. It may be pink or even red; inform your doctor if you have a significant amount of clot in the urine or if you are unable to void at all or if your catheter stops draining. It is not uncommon to have some burning when you urinate, this will gradually improve. With a catheter in place, it is not uncommon to have occasional leakage of urine or blood around the catheter. Please call your urologist if this is excessive and/or the urine is not draining through the catheter into the bag.  •	Bathing: You may shower or bathe. If going home with [place], shower only until catheter is removed.  •	Diet: You may resume your regular diet and regular medication regimen.  •	Pain: You may take Tylenol (acetaminophen) 650-975mg and/or Motrin (ibuprofen) 400-600mg, available over the counter, for pain every 6 hours as needed. Do not exceed 4000 milligrams of Tylenol (acetaminophen) daily. You may alternate these medications such that you take either one every 3 hours.  •	Stool softeners: Do not allow yourself to become constipated as straining will cause bleeding. Take stool softeners (ex. Colace) or a laxative (ex. Senekot, ExLax), available over the counter, if needed.  •	Activity: No heavy lifting or strenuous exercise until you are evaluated at your post-operative appointment. Otherwise, you may return to your usual level of activity.  •	Anticoagulation: If you are taking any blood thinning medications, please discuss with your urologist prior to restarting these medications unless otherwise specified. YOU MAY RESUME ELIQUIS TOMORROW.   •	Follow-up: If you did not already schedule your post-operative appointment, please call your urologist to schedule a follow-up appointment. Dr. Caicedo with call with pathology results.   •	Call your urologist if: You have any bleeding that does not stop, inability to void >8 hours, fever over 100.4 F, chills, persistent nausea/vomiting, or if your pain is not controlled on your discharge pain medications.

## 2022-01-18 NOTE — ASU PATIENT PROFILE, ADULT - FALL HARM RISK - UNIVERSAL INTERVENTIONS
Bed in lowest position, wheels locked, appropriate side rails in place/Call bell, personal items and telephone in reach/Instruct patient to call for assistance before getting out of bed or chair/Non-slip footwear when patient is out of bed/[place] to call system/Physically safe environment - no spills, clutter or unnecessary equipment/Purposeful Proactive Rounding/Room/bathroom lighting operational, light cord in reach

## 2022-01-18 NOTE — ASU PATIENT PROFILE, ADULT - VISION (WITH CORRECTIVE LENSES IF THE PATIENT USUALLY WEARS THEM):
wears corrective lenses/left eye prosthesis/Partially impaired: cannot see medication labels or newsprint, but can see obstacles in path, and the surrounding layout; can count fingers at arm's length

## 2022-01-19 LAB — SURGICAL PATHOLOGY STUDY: SIGNIFICANT CHANGE UP

## 2022-01-26 ENCOUNTER — NON-APPOINTMENT (OUTPATIENT)
Age: 77
End: 2022-01-26

## 2022-01-30 LAB — BACTERIA UR CULT: NORMAL

## 2022-02-02 NOTE — H&P PST ADULT - BP NONINVASIVE MEAN (MM HG)
Cozaar      Last Written Prescription Date:  11.3.21  Last Fill Quantity: #90,   # refills: 0  Last Office Visit: 12.1.21  Future Office visit:    Next 5 appointments (look out 90 days)    Apr 12, 2022  8:20 AM  (Arrive by 8:05 AM)  SHORT with Annette William NP  [facility] [place] ([facility] [place] ) [address]  [place] [address]  [phone]           Routing refill request to provider for review/approval because:       93

## 2022-02-14 ENCOUNTER — APPOINTMENT (OUTPATIENT)
Dept: CARDIOLOGY | Facility: CLINIC | Age: 77
End: 2022-02-14
Payer: MEDICARE

## 2022-02-14 ENCOUNTER — NON-APPOINTMENT (OUTPATIENT)
Age: 77
End: 2022-02-14

## 2022-02-14 VITALS
BODY MASS INDEX: 27.43 KG/M2 | TEMPERATURE: 97.6 F | HEIGHT: 68 IN | HEART RATE: 78 BPM | DIASTOLIC BLOOD PRESSURE: 80 MMHG | OXYGEN SATURATION: 98 % | WEIGHT: 181 LBS | SYSTOLIC BLOOD PRESSURE: 115 MMHG

## 2022-02-14 DIAGNOSIS — R41.0 DISORIENTATION, UNSPECIFIED: ICD-10-CM

## 2022-02-14 DIAGNOSIS — R68.89 OTHER GENERAL SYMPTOMS AND SIGNS: ICD-10-CM

## 2022-02-14 PROCEDURE — 93000 ELECTROCARDIOGRAM COMPLETE: CPT

## 2022-02-14 PROCEDURE — 99214 OFFICE O/P EST MOD 30 MIN: CPT

## 2022-02-14 NOTE — HISTORY OF PRESENT ILLNESS
[FreeTextEntry1] : The patient presents for evaluation of high blood pressure. Patient is currently tolerating the current antihypertensive regime and they deny headaches, stiff neck, visual changes, pedal Edema or PND. They also are here for follow-up of elevated cholesterol. Patient is currently tolerating medication and and does not complain of new muscle pain, joint pain, back pain,urinary changes ,nausea, vomiting, abdominal pain or diarrhea. The patient is trying to follow a low cholesterol diet. \par \par The patient is also here for follow-up of atrial fibrillation and currently they feel well with an occasional episode of palpitations. The patient states they are reliably taking the anticoagulation medicine and are not having any signs of bleeding they deny any dizziness headaches nosebleeds or black tarry stools \par \par Pt is s/p HENRRY/DCCV March 2021, had ablation in October. Pt is followed by Dr. Morgan\par \par Pt w/ hx of bladder CA, scheduled for cystoscopy 1/18/2022\par \par pt is here for follow up of TAA they have been taking medication as prescribed and blood pressure have been observed in the a reasonable range, their are no reports of SSCP , back pain or syncope. \par \par Pt states today that they had both covid 19 vaccine . pt had the COVID-19 vaccine without major side effects. The patient did experience mild fatigue headache and arm soreness. The patient denied any fever over 100.5. pt denies any recent sore throat, fever, chills loss of taste or smell. \par \par Pt states he has been very forgetful lately. Pt states that today, while in his car while waiting for a doctor appointment, he has an episode of confusion and disorientation for about a half hour. He could not remember his doctor's names or why he was there. Pt denies any headache, blurry vision, dizziness. \par \par I was asked to see this delightful patient today for Pre-op Evaluation prior to endoscopic and colonoscopic procedure surgery with __ with Dr. Alex  at fax number _______ . The patient denies fever, cough, wheezing, sputum production, hemoptysis, dyspnea, congestion, diarrhea, constipation, nausea, vomiting, bright red blood per rectum, melena, angina, chest pain, palpitations, diaphoresis, PND, incontinence, frequency, urgency, dysuria, edema, joint pain, headache, weakness, numbness and dizziness. The date of the planned procedure is: TBEKATERINA

## 2022-02-16 ENCOUNTER — APPOINTMENT (OUTPATIENT)
Dept: GASTROENTEROLOGY | Facility: CLINIC | Age: 77
End: 2022-02-16
Payer: MEDICARE

## 2022-02-16 ENCOUNTER — NON-APPOINTMENT (OUTPATIENT)
Age: 77
End: 2022-02-16

## 2022-02-16 VITALS
TEMPERATURE: 98 F | HEART RATE: 75 BPM | DIASTOLIC BLOOD PRESSURE: 74 MMHG | HEIGHT: 68 IN | OXYGEN SATURATION: 98 % | BODY MASS INDEX: 28.19 KG/M2 | SYSTOLIC BLOOD PRESSURE: 120 MMHG | WEIGHT: 186 LBS

## 2022-02-16 PROCEDURE — 99213 OFFICE O/P EST LOW 20 MIN: CPT

## 2022-02-16 NOTE — PHYSICAL EXAM
[General Appearance - Alert] : alert [General Appearance - In No Acute Distress] : in no acute distress [Neck Appearance] : the appearance of the neck was normal [Neck Cervical Mass (___cm)] : no neck mass was observed [Jugular Venous Distention Increased] : there was no jugular-venous distention [Thyroid Diffuse Enlargement] : the thyroid was not enlarged [Thyroid Nodule] : there were no palpable thyroid nodules [Auscultation Breath Sounds / Voice Sounds] : lungs were clear to auscultation bilaterally [Heart Rate And Rhythm] : heart rate was normal and rhythm regular [Heart Sounds] : normal S1 and S2 [Heart Sounds Gallop] : no gallops [Murmurs] : no murmurs [Heart Sounds Pericardial Friction Rub] : no pericardial rub [Edema] : there was no peripheral edema [Bowel Sounds] : normal bowel sounds [Abdomen Soft] : soft [] : no hepato-splenomegaly [Abdomen Tenderness] : non-tender [Abdomen Mass (___ Cm)] : no abdominal mass palpated [No CVA Tenderness] : no ~M costovertebral angle tenderness [No Spinal Tenderness] : no spinal tenderness [Oriented To Time, Place, And Person] : oriented to person, place, and time [Impaired Insight] : insight and judgment were intact [Affect] : the affect was normal

## 2022-02-18 NOTE — ASSESSMENT
[FreeTextEntry1] : Patient with history of adenomatous colonic polyps.  He has a history of hepatitis C with a sustained viral response.\par \par A colonoscopy has been scheduled. The risks, benefits, alternatives, and limitations of the procedure, including the possibility of missed lesions, were explained.  The patient has already been cleared by cardiology and I will forward the note to the anesthesiologist for their review.  The patient will need to hold Eliquis for 2 days prior to the procedure.\par \par \par Plan from 8/24/2021 - Patient with a history of hepatitis C and a sustained viral response.  Virus has been undetectable.  There was a question of significant fibrosis on blood work but FibroScan did not show any evidence of fibrosis.  He has a history of adenomatous colonic polyps.\par \par Blood work will be sent for LFTs, PT, alpha-fetoprotein.\par \par Although the patient is due for a colonoscopy in November, I advised the patient that we should defer it as he is currently being treated for bladder cancer and has his cardiac issues.  He will return to see me in January at which time we will consider colonoscopy.  If we do, he will require both cardiac and anesthesia clearance prior to the procedure.\par \par \par Plan from 1/14/2021 - Patient with a history of hepatitis C which was treated with a sustained viral response and a history of adenomatous colonic polyps.\par \par Blood work was sent for HCVRNA, HCV FibroSure testing, LFTs, PT, alpha-fetoprotein.\par \par Patient will return to see me in July.  He is due for colonoscopy in November of this year.\par \par \par Plan from 1/7/2019 - Patient status post colonoscopy with diverticulosis. He has a history of colonic polyps. He has a history of hepatitis C with negative labs in August.\par \par Information was given to the patient regarding diverticulosis and a high-fiber diet.\par \par Patient will return to see me over the summer at which time we will check labs which should be followed every 6-12 months.\par \par We will repeat a colonoscopy in 3 years that is November 2021 given the patient's history of polyps.\par \par \par Plan from 8/31/18 - Patient with a history of colonic polyps and a history of hepatitis C with sustained response.\par \par A colonoscopy has been scheduled. The risks, benefits, alternatives, and limitations of the procedure, including the possibility of missed lesions, were explained.\par \par Blood work was sent for HCVRNA, LFTs, AFP.\par \par \par Plan from 8/15/17 - Patient with a history of colonic polyps and history of hepatitis C with sustained viral response to treatment.\par \par Blood work was sent for HCV RNA, LFTs, AFP.\par \par Patient is not yet do for his colonoscopy. He will return to see me in one year at which time we will pursue colonoscopy.

## 2022-02-18 NOTE — HISTORY OF PRESENT ILLNESS
[FreeTextEntry1] : The patient has a history of adenomatous colonic polyps.  He also has a history of hepatitis C with a sustained viral response.  He has atrial fibrillation and is on Eliquis.  He has been treated for bladder cancer and is now being observed.  He feels well denying abdominal pain, diarrhea, constipation, melena, bright red blood per rectum.  He reports 3 solid bowel movements a day.  He denies heartburn, dysphagia, nausea, vomiting.  LFTs were normal at his last visit on August 24, 2021.\par \par \par Note from 8/24/2021 - The patient has a history of hepatitis C with a sustained viral response and a history of adenomatous colonic polyps.  Blood work from his last visit on January 14, 2021 revealed normal LFTs with undetectable hepatitis C.  HCV FibroSure testing revealed no inflammation but F3 disease consistent with significant fibrosis.  The patient subsequently underwent a FibroScan on February 5, 2021 which did not show any evidence of fibrosis.  The patient has been undergoing treatment for bladder cancer and was hospitalized in March for nosebleeds and atrial fibrillation.  He underwent cardioversion on March 2, 2021.  He also has an aortic aneurysm measuring 4.5 cm.  He feels well denying abdominal pain, heartburn, dysphagia.  Bowel movements are normal without melena or bright red blood per rectum.  The patient denies any alcohol intake.  He remains on Eliquis.\par \par \par Note from 1/14/2021 - The patient has a history of hepatitis C with a sustained viral response.  He also has a history of adenomatous colonic polyps and has bladder cancer which was treated with chemotherapy infused directly into the bladder.  He has a thoracic aortic aneurysm measuring 4.5 cm which is followed by cardiology.  The patient feels well denying abdominal pain, heartburn, dysphagia.  He has 2-3 solid bowel movements a day without melena or bright red blood per rectum.  The patient's weight is stable.  He has not been hospitalized in the past year.\par \par \par Note from 1/7/2019 - We reviewed the evaluations done since the patient's last visit on August 31, 2018. Blood work from that day was normal with no evidence of hepatitis C. The patient is status post colonoscopy performed on October 31, 2018. A polyp was removed with negative biopsies. The patient also has diverticulosis and internal hemorrhoids which are asymptomatic. The patient denies abdominal pain. Bowel movements are normal without melena or bright blood per rectum.\par \par \par Note from 8/31/18 - The patient has a history of colonic polyps and a history of hep C with a sustained viral response. His blood work from his last visit in August 2017 revealed an undetectable HCVRNA. The patient has had for eye surgeries in the past 2 months and 8 in the past 2 years but is otherwise well. He denies abdominal pain. He is to solid bowel movements a day without melena or bright red blood per rectum. He denies heartburn or dysphagia. He has lost 8 pounds in the past year. The patient has not been hospitalized in the past year and denies any cardiac issues.\par \par \par Note from 8/15/17 - The patient has a history of colonic polyps, a history of bladder cancer, and a history of hepatitis C which was treated in 2009 with Pegasys and ribavirin. Despite only taking 14 weeks of treatment due to a drug eruption, he is achieved a sustained viral response. The patient feels well denying abdominal pain, diarrhea, constipation, melena, bright blood per rectum, heartburn, or dysphagia. His weight is stable. The patient has not been hospitalized in the past year and denies any cardiac issues. The patient takes Percocet essentially daily for back and leg pain.

## 2022-02-18 NOTE — CONSULT LETTER
[FreeTextEntry1] : Dear Dr. Shana Nix,\[facility] \[facility] I had the pleasure of seeing your patient TAJ HUBBARD in the office today.  My office note is attached. PLEASE READ THE "ASSESSMENT" SECTION OF THE NOTE TO SEE MY IMPRESSION AND PLAN.\par \[facility] Thank you very much for allowing me to participate in the care of your patient.\[facility] \[facility] Sincerely,\[facility] \[facility] Yeison Alex M.D., FAC, [facility] Director, Celiac Program at [facility]  of Medicine\[facility] [facility] [employer] at [facility] Practice Director,\[facility] - Gastroenterology/Internal Medicine at [address]\[place], [place] [address] Tel: [phone]\[facility] Email: [email or web address] \[facility] \[facility] The attached note has been created using a voice recognition system (Dragon).  There may be some misspellings and typos.  Please call my office if you have any issues or questions.

## 2022-03-08 ENCOUNTER — APPOINTMENT (OUTPATIENT)
Dept: GASTROENTEROLOGY | Facility: CLINIC | Age: 77
End: 2022-03-08
Payer: MEDICARE

## 2022-03-08 PROCEDURE — 45380 COLONOSCOPY AND BIOPSY: CPT | Mod: 59,PT

## 2022-03-08 PROCEDURE — 45385 COLONOSCOPY W/LESION REMOVAL: CPT | Mod: PT

## 2022-03-09 ENCOUNTER — NON-APPOINTMENT (OUTPATIENT)
Age: 77
End: 2022-03-09

## 2022-03-10 ENCOUNTER — NON-APPOINTMENT (OUTPATIENT)
Age: 77
End: 2022-03-10

## 2022-04-12 ENCOUNTER — APPOINTMENT (OUTPATIENT)
Dept: GASTROENTEROLOGY | Facility: CLINIC | Age: 77
End: 2022-04-12
Payer: MEDICARE

## 2022-04-12 VITALS
WEIGHT: 176 LBS | SYSTOLIC BLOOD PRESSURE: 120 MMHG | BODY MASS INDEX: 26.67 KG/M2 | DIASTOLIC BLOOD PRESSURE: 78 MMHG | HEIGHT: 68 IN | OXYGEN SATURATION: 97 % | TEMPERATURE: 97.5 F | HEART RATE: 93 BPM

## 2022-04-12 DIAGNOSIS — K64.8 OTHER HEMORRHOIDS: ICD-10-CM

## 2022-04-12 DIAGNOSIS — K57.30 DIVERTICULOSIS OF LARGE INTESTINE W/OUT PERFORATION OR ABSCESS W/OUT BLEEDING: ICD-10-CM

## 2022-04-12 DIAGNOSIS — D12.6 BENIGN NEOPLASM OF COLON, UNSPECIFIED: ICD-10-CM

## 2022-04-12 DIAGNOSIS — K64.4 RESIDUAL HEMORRHOIDAL SKIN TAGS: ICD-10-CM

## 2022-04-12 PROCEDURE — 99213 OFFICE O/P EST LOW 20 MIN: CPT

## 2022-04-13 ENCOUNTER — NON-APPOINTMENT (OUTPATIENT)
Age: 77
End: 2022-04-13

## 2022-04-15 ENCOUNTER — NON-APPOINTMENT (OUTPATIENT)
Age: 77
End: 2022-04-15

## 2022-04-16 NOTE — ASSESSMENT
[FreeTextEntry1] : Patient with adenomatous colonic polyps and diverticulosis who is doing well.\par \par Information was given to the patient regarding diverticulosis and a high-fiber diet.\par \par We will consider repeating a colonoscopy in 3 years that is March 2025.\par \par \par Plan from 2/16/2022 - Patient with history of adenomatous colonic polyps.  He has a history of hepatitis C with a sustained viral response.\par \par A colonoscopy has been scheduled. The risks, benefits, alternatives, and limitations of the procedure, including the possibility of missed lesions, were explained.  The patient has already been cleared by cardiology and I will forward the note to the anesthesiologist for their review.  The patient will need to hold Eliquis for 2 days prior to the procedure.\par \par \par Plan from 8/24/2021 - Patient with a history of hepatitis C and a sustained viral response.  Virus has been undetectable.  There was a question of significant fibrosis on blood work but FibroScan did not show any evidence of fibrosis.  He has a history of adenomatous colonic polyps.\par \par Blood work will be sent for LFTs, PT, alpha-fetoprotein.\par \par Although the patient is due for a colonoscopy in November, I advised the patient that we should defer it as he is currently being treated for bladder cancer and has his cardiac issues.  He will return to see me in January at which time we will consider colonoscopy.  If we do, he will require both cardiac and anesthesia clearance prior to the procedure.\par \par \par Plan from 1/14/2021 - Patient with a history of hepatitis C which was treated with a sustained viral response and a history of adenomatous colonic polyps.\par \par Blood work was sent for HCVRNA, HCV FibroSure testing, LFTs, PT, alpha-fetoprotein.\par \par Patient will return to see me in July.  He is due for colonoscopy in November of this year.\par \par \par Plan from 1/7/2019 - Patient status post colonoscopy with diverticulosis. He has a history of colonic polyps. He has a history of hepatitis C with negative labs in August.\par \par Information was given to the patient regarding diverticulosis and a high-fiber diet.\par \par Patient will return to see me over the summer at which time we will check labs which should be followed every 6-12 months.\par \par We will repeat a colonoscopy in 3 years that is November 2021 given the patient's history of polyps.\par \par \par Plan from 8/31/18 - Patient with a history of colonic polyps and a history of hepatitis C with sustained response.\par \par A colonoscopy has been scheduled. The risks, benefits, alternatives, and limitations of the procedure, including the possibility of missed lesions, were explained.\par \par Blood work was sent for HCVRNA, LFTs, AFP.\par \par \par Plan from 8/15/17 - Patient with a history of colonic polyps and history of hepatitis C with sustained viral response to treatment.\par \par Blood work was sent for HCV RNA, LFTs, AFP.\par \par Patient is not yet do for his colonoscopy. He will return to see me in one year at which time we will pursue colonoscopy.

## 2022-04-16 NOTE — HISTORY OF PRESENT ILLNESS
[FreeTextEntry1] : We reviewed the patient's colonoscopy performed on March 8, 2022.  The exam was significant for removal of 5 polyps, 3 tubular adenomas and 2 hyperplastic.  Clips were placed to prevent bleeding.  The patient also has sigmoid diverticulosis and internal and external hemorrhoids which are asymptomatic.  He is using Metamucil.  He feels well denying abdominal pain.  He reports 1-2 bowel movements a day without melena or bright red blood per rectum.\par \par \par Note from 2/16/2022 - The patient has a history of adenomatous colonic polyps.  He also has a history of hepatitis C with a sustained viral response.  He has atrial fibrillation and is on Eliquis.  He has been treated for bladder cancer and is now being observed.  He feels well denying abdominal pain, diarrhea, constipation, melena, bright red blood per rectum.  He reports 3 solid bowel movements a day.  He denies heartburn, dysphagia, nausea, vomiting.  LFTs were normal at his last visit on August 24, 2021.\par \par \par Note from 8/24/2021 - The patient has a history of hepatitis C with a sustained viral response and a history of adenomatous colonic polyps.  Blood work from his last visit on January 14, 2021 revealed normal LFTs with undetectable hepatitis C.  HCV FibroSure testing revealed no inflammation but F3 disease consistent with significant fibrosis.  The patient subsequently underwent a FibroScan on February 5, 2021 which did not show any evidence of fibrosis.  The patient has been undergoing treatment for bladder cancer and was hospitalized in March for nosebleeds and atrial fibrillation.  He underwent cardioversion on March 2, 2021.  He also has an aortic aneurysm measuring 4.5 cm.  He feels well denying abdominal pain, heartburn, dysphagia.  Bowel movements are normal without melena or bright red blood per rectum.  The patient denies any alcohol intake.  He remains on Eliquis.\par \par \par Note from 1/14/2021 - The patient has a history of hepatitis C with a sustained viral response.  He also has a history of adenomatous colonic polyps and has bladder cancer which was treated with chemotherapy infused directly into the bladder.  He has a thoracic aortic aneurysm measuring 4.5 cm which is followed by cardiology.  The patient feels well denying abdominal pain, heartburn, dysphagia.  He has 2-3 solid bowel movements a day without melena or bright red blood per rectum.  The patient's weight is stable.  He has not been hospitalized in the past year.\par \par \par Note from 1/7/2019 - We reviewed the evaluations done since the patient's last visit on August 31, 2018. Blood work from that day was normal with no evidence of hepatitis C. The patient is status post colonoscopy performed on October 31, 2018. A polyp was removed with negative biopsies. The patient also has diverticulosis and internal hemorrhoids which are asymptomatic. The patient denies abdominal pain. Bowel movements are normal without melena or bright blood per rectum.\par \par \par Note from 8/31/18 - The patient has a history of colonic polyps and a history of hep C with a sustained viral response. His blood work from his last visit in August 2017 revealed an undetectable HCVRNA. The patient has had for eye surgeries in the past 2 months and 8 in the past 2 years but is otherwise well. He denies abdominal pain. He is to solid bowel movements a day without melena or bright red blood per rectum. He denies heartburn or dysphagia. He has lost 8 pounds in the past year. The patient has not been hospitalized in the past year and denies any cardiac issues.\par \par \par Note from 8/15/17 - The patient has a history of colonic polyps, a history of bladder cancer, and a history of hepatitis C which was treated in 2009 with Pegasys and ribavirin. Despite only taking 14 weeks of treatment due to a drug eruption, he is achieved a sustained viral response. The patient feels well denying abdominal pain, diarrhea, constipation, melena, bright blood per rectum, heartburn, or dysphagia. His weight is stable. The patient has not been hospitalized in the past year and denies any cardiac issues. The patient takes Percocet essentially daily for back and leg pain.

## 2022-04-16 NOTE — CONSULT LETTER
[FreeTextEntry1] : Dear Dr. Shana Nix,\[facility] \[facility] I had the pleasure of seeing your patient TAJ HUBBARD in the office today.  My office note is attached. PLEASE READ THE "ASSESSMENT" SECTION OF THE NOTE TO SEE MY IMPRESSION AND PLAN.\par \[facility] Thank you very much for allowing me to participate in the care of your patient.\[facility] \[facility] Sincerely,\[facility] \[facility] Yeison Alex M.D., FAC, [facility] Director, Celiac Program at [facility]  of Medicine\[facility] [employer] at \[facility]\""/[facility] Practice Director,\[facility] - Gastroenterology/Internal Medicine at [address]\[place], [place] [address] Tel: [phone]\[facility] Email: [email or web address] \[facility] \[facility] The attached note has been created using a voice recognition system (Dragon).  There may be some misspellings and typos.  Please call my office if you have any issues or questions.

## 2022-04-25 ENCOUNTER — APPOINTMENT (OUTPATIENT)
Dept: NEUROLOGY | Facility: CLINIC | Age: 77
End: 2022-04-25

## 2022-04-26 ENCOUNTER — OUTPATIENT (OUTPATIENT)
Dept: OUTPATIENT SERVICES | Facility: HOSPITAL | Age: 77
LOS: 1 days | End: 2022-04-26
Payer: MEDICARE

## 2022-04-26 VITALS
SYSTOLIC BLOOD PRESSURE: 119 MMHG | DIASTOLIC BLOOD PRESSURE: 82 MMHG | TEMPERATURE: 98 F | RESPIRATION RATE: 15 BRPM | HEART RATE: 83 BPM | OXYGEN SATURATION: 96 % | HEIGHT: 68 IN | WEIGHT: 179.9 LBS

## 2022-04-26 DIAGNOSIS — Z98.890 OTHER SPECIFIED POSTPROCEDURAL STATES: Chronic | ICD-10-CM

## 2022-04-26 DIAGNOSIS — Z98.89 OTHER SPECIFIED POSTPROCEDURAL STATES: Chronic | ICD-10-CM

## 2022-04-26 DIAGNOSIS — Z01.818 ENCOUNTER FOR OTHER PREPROCEDURAL EXAMINATION: ICD-10-CM

## 2022-04-26 DIAGNOSIS — C67.9 MALIGNANT NEOPLASM OF BLADDER, UNSPECIFIED: ICD-10-CM

## 2022-04-26 DIAGNOSIS — Z92.241 PERSONAL HISTORY OF SYSTEMIC STEROID THERAPY: Chronic | ICD-10-CM

## 2022-04-26 LAB
ANION GAP SERPL CALC-SCNC: 14 MMOL/L — SIGNIFICANT CHANGE UP (ref 5–17)
BUN SERPL-MCNC: 16 MG/DL — SIGNIFICANT CHANGE UP (ref 7–23)
CALCIUM SERPL-MCNC: 9 MG/DL — SIGNIFICANT CHANGE UP (ref 8.4–10.5)
CHLORIDE SERPL-SCNC: 107 MMOL/L — SIGNIFICANT CHANGE UP (ref 96–108)
CO2 SERPL-SCNC: 20 MMOL/L — LOW (ref 22–31)
CREAT SERPL-MCNC: 0.84 MG/DL — SIGNIFICANT CHANGE UP (ref 0.5–1.3)
EGFR: 90 ML/MIN/1.73M2 — SIGNIFICANT CHANGE UP
GLUCOSE SERPL-MCNC: 93 MG/DL — SIGNIFICANT CHANGE UP (ref 70–99)
HCT VFR BLD CALC: 43.6 % — SIGNIFICANT CHANGE UP (ref 39–50)
HGB BLD-MCNC: 14.6 G/DL — SIGNIFICANT CHANGE UP (ref 13–17)
MCHC RBC-ENTMCNC: 33.5 GM/DL — SIGNIFICANT CHANGE UP (ref 32–36)
MCHC RBC-ENTMCNC: 34.6 PG — HIGH (ref 27–34)
MCV RBC AUTO: 103.3 FL — HIGH (ref 80–100)
NRBC # BLD: 0 /100 WBCS — SIGNIFICANT CHANGE UP (ref 0–0)
PLATELET # BLD AUTO: 204 K/UL — SIGNIFICANT CHANGE UP (ref 150–400)
POTASSIUM SERPL-MCNC: 4.1 MMOL/L — SIGNIFICANT CHANGE UP (ref 3.5–5.3)
POTASSIUM SERPL-SCNC: 4.1 MMOL/L — SIGNIFICANT CHANGE UP (ref 3.5–5.3)
RBC # BLD: 4.22 M/UL — SIGNIFICANT CHANGE UP (ref 4.2–5.8)
RBC # FLD: 12.3 % — SIGNIFICANT CHANGE UP (ref 10.3–14.5)
SODIUM SERPL-SCNC: 141 MMOL/L — SIGNIFICANT CHANGE UP (ref 135–145)
WBC # BLD: 5.93 K/UL — SIGNIFICANT CHANGE UP (ref 3.8–10.5)
WBC # FLD AUTO: 5.93 K/UL — SIGNIFICANT CHANGE UP (ref 3.8–10.5)

## 2022-04-26 PROCEDURE — 36415 COLL VENOUS BLD VENIPUNCTURE: CPT

## 2022-04-26 PROCEDURE — 80048 BASIC METABOLIC PNL TOTAL CA: CPT

## 2022-04-26 PROCEDURE — G0463: CPT

## 2022-04-26 PROCEDURE — 85027 COMPLETE CBC AUTOMATED: CPT

## 2022-04-26 PROCEDURE — 87086 URINE CULTURE/COLONY COUNT: CPT

## 2022-04-26 RX ORDER — SODIUM CHLORIDE 9 MG/ML
1000 INJECTION, SOLUTION INTRAVENOUS
Refills: 0 | Status: DISCONTINUED | OUTPATIENT
Start: 2022-05-17 | End: 2022-05-31

## 2022-04-26 RX ORDER — SODIUM CHLORIDE 9 MG/ML
3 INJECTION INTRAMUSCULAR; INTRAVENOUS; SUBCUTANEOUS EVERY 8 HOURS
Refills: 0 | Status: DISCONTINUED | OUTPATIENT
Start: 2022-05-17 | End: 2022-05-31

## 2022-04-26 NOTE — H&P PST ADULT - NSICDXPASTMEDICALHX_GEN_ALL_CORE_FT
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 follow up after an year    Chronic back pain -on oxycodone prn and tylenol    Colon cancer     Epistaxis hospitalized 3/2021    Hepatitis C Pt was treated 7 years ago    History of sciatica leg pains    Lumbar disc disease     Malignant neoplasm of colon, unspecified part of colon s/p microsurgery at [facility]  no chemo/XRT in remission since 2012

## 2022-04-26 NOTE — H&P PST ADULT - HEALTH CARE MAINTENANCE
completed primary covid vaccine series   UTD with screening examinations completed primary covid vaccine series + booster  UTD with screening examinations

## 2022-04-26 NOTE — H&P PST ADULT - NEGATIVE CARDIOVASCULAR SYMPTOMS
chest pain/sickle cell crisis no chest pain/no palpitations/no dyspnea on exertion/no orthopnea/no paroxysmal nocturnal dyspnea/no peripheral edema

## 2022-04-26 NOTE — H&P PST ADULT - FALL HARM RISK - UNIVERSAL INTERVENTIONS
Bed in lowest position, wheels locked, appropriate side rails in place/Call bell, personal items and telephone in reach/Instruct patient to call for assistance before getting out of bed or chair/Non-slip footwear when patient is out of bed/[place] to call system/Physically safe environment - no spills, clutter or unnecessary equipment/Purposeful Proactive Rounding/Room/bathroom lighting operational, light cord in reach

## 2022-04-26 NOTE — H&P PST ADULT - PROBLEM SELECTOR PLAN 1
Cystoscopy  Bladder Biopsy and Fulguration   -cbc, bmp, urine culture done at [facility]  -medical evaluation  -instructed last dose of eliquis 5/14 evening dose  -preop instructions provided. All questions answered Cystoscopy  Bladder Biopsy and Fulguration   -cbc, bmp, urine culture done at [facility]  -medical evaluation  -instructed to hold eliquis 2 days preop  -preop instructions provided. All questions answered

## 2022-04-26 NOTE — H&P PST ADULT - NSICDXPASTSURGICALHX_GEN_ALL_CORE_FT
PAST SURGICAL HISTORY:  H/O eye surgery 1/2017    History of cardioversion 3/2021 and HENRRY    History of cystoscopy -TURBT - multiple procedures ( last procedure 2/2016)  Cystoscopy and bladder Biopsy -  01/2021  cystoscopy 9/2021, January 18th, 2022    History of hernia repair     Status post epidural steroid injection july/2021

## 2022-04-26 NOTE — H&P PST ADULT - MUSCULOSKELETAL
negative ROM intact/no joint swelling/no joint erythema/no joint warmth/no calf tenderness detailed exam

## 2022-04-26 NOTE — H&P PST ADULT - OTHER CARE PROVIDERS
Cardiologist--Dr. Arnold Ward, [facility], [phone]//Neurologist--Dr. Elvis Lemus, [facility], [phone]//Pain Management Specialist--Dr. Ardon, [phone]

## 2022-04-26 NOTE — H&P PST ADULT - HISTORY OF PRESENT ILLNESS
76 year old male with PMH of Aortic aneurysm, 4.2cm unchanged, (last Chest CT 04/2021), A-fib on Eliquis, s/p HENRRY/ DCCV on 03/2021 and Ablation 10/2021, (stress test  05/2021- normal study), Colon and Bladder Cancer s/p chemo years ago in remission -- last cystoscopy 05/2021. He presents to PST  evaluation prior to follow-up Cystoscopy, Bladder biopsy on 5/17/2022.  He denies chest pain, palpitations, dizziness, syncope, fever, chills, dyspnea, gross hematuria, dysuria.    preop covid screen 5/14 @ [facility]     76 year old male with PMH of Aortic aneurysm, 4.2cm unchanged, (last Chest CT 04/2021), A-fib on Eliquis, s/p HENRRY/ DCCV on 03/2021 and Ablation 10/2021, (stress test  05/2021- normal study), Colon and Bladder Cancer s/p chemo years ago in remission -- last cystoscopy January 2022. He presents to PST  evaluation prior to follow-up Cystoscopy, Bladder biopsy on 5/17/2022.  He denies chest pain, palpitations, dizziness, syncope, fever, chills, dyspnea, gross hematuria, dysuria.    preop covid screen 5/14 @ [facility]

## 2022-04-26 NOTE — H&P PST ADULT - NSANTHOSAYNRD_GEN_A_CORE
sleep study approx 1 year ago at [facility] Study, negative sleep study as per patient/No. TONY screening performed.  STOP BANG Legend: 0-2 = LOW Risk; 3-4 = INTERMEDIATE Risk; 5-8 = HIGH Risk sleep study approx 1 year ago at [facility] Study, patient states negative study/No. TONY screening performed.  STOP BANG Legend: 0-2 = LOW Risk; 3-4 = INTERMEDIATE Risk; 5-8 = HIGH Risk

## 2022-04-27 PROBLEM — I48.91 UNSPECIFIED ATRIAL FIBRILLATION: Chronic | Status: ACTIVE | Noted: 2021-05-11

## 2022-04-27 PROBLEM — N40.0 BENIGN PROSTATIC HYPERPLASIA WITHOUT LOWER URINARY TRACT SYMPTOMS: Chronic | Status: ACTIVE | Noted: 2022-04-26

## 2022-04-27 PROBLEM — M54.9 DORSALGIA, UNSPECIFIED: Chronic | Status: ACTIVE | Noted: 2021-09-07

## 2022-04-27 LAB
CULTURE RESULTS: SIGNIFICANT CHANGE UP
SPECIMEN SOURCE: SIGNIFICANT CHANGE UP

## 2022-05-04 ENCOUNTER — LABORATORY RESULT (OUTPATIENT)
Age: 77
End: 2022-05-04

## 2022-05-04 ENCOUNTER — APPOINTMENT (OUTPATIENT)
Dept: CARDIOLOGY | Facility: CLINIC | Age: 77
End: 2022-05-04
Payer: MEDICARE

## 2022-05-04 ENCOUNTER — APPOINTMENT (OUTPATIENT)
Dept: PULMONOLOGY | Facility: CLINIC | Age: 77
End: 2022-05-04
Payer: MEDICARE

## 2022-05-04 ENCOUNTER — NON-APPOINTMENT (OUTPATIENT)
Age: 77
End: 2022-05-04

## 2022-05-04 VITALS
OXYGEN SATURATION: 98 % | SYSTOLIC BLOOD PRESSURE: 120 MMHG | WEIGHT: 181 LBS | RESPIRATION RATE: 17 BRPM | BODY MASS INDEX: 27.43 KG/M2 | HEART RATE: 74 BPM | TEMPERATURE: 96.4 F | HEIGHT: 68 IN | DIASTOLIC BLOOD PRESSURE: 72 MMHG

## 2022-05-04 VITALS
HEART RATE: 68 BPM | OXYGEN SATURATION: 98 % | BODY MASS INDEX: 27.43 KG/M2 | WEIGHT: 181 LBS | HEIGHT: 68 IN | SYSTOLIC BLOOD PRESSURE: 116 MMHG | DIASTOLIC BLOOD PRESSURE: 68 MMHG

## 2022-05-04 DIAGNOSIS — Z83.6 FAMILY HISTORY OF OTHER DISEASES OF THE RESPIRATORY SYSTEM: ICD-10-CM

## 2022-05-04 PROCEDURE — ZZZZZ: CPT

## 2022-05-04 PROCEDURE — 94010 BREATHING CAPACITY TEST: CPT

## 2022-05-04 PROCEDURE — 99214 OFFICE O/P EST MOD 30 MIN: CPT | Mod: 25

## 2022-05-04 PROCEDURE — 95012 NITRIC OXIDE EXP GAS DETER: CPT

## 2022-05-04 PROCEDURE — 94727 GAS DIL/WSHOT DETER LNG VOL: CPT

## 2022-05-04 PROCEDURE — 94729 DIFFUSING CAPACITY: CPT

## 2022-05-04 PROCEDURE — 99214 OFFICE O/P EST MOD 30 MIN: CPT

## 2022-05-04 PROCEDURE — 93000 ELECTROCARDIOGRAM COMPLETE: CPT

## 2022-05-04 RX ORDER — MUCUS CLEARING DEVICE
EACH MISCELLANEOUS
Qty: 1 | Refills: 1 | Status: ACTIVE | OUTPATIENT
Start: 2022-05-04

## 2022-05-04 RX ORDER — SODIUM SULFATE, POTASSIUM SULFATE, MAGNESIUM SULFATE 17.5; 3.13; 1.6 G/ML; G/ML; G/ML
17.5-3.13-1.6 SOLUTION, CONCENTRATE ORAL
Qty: 1 | Refills: 0 | Status: DISCONTINUED | COMMUNITY
Start: 2022-02-16 | End: 2022-05-04

## 2022-05-04 NOTE — REASON FOR VISIT
[Follow-Up] : a follow-up visit [TextBox_44] : SOB, mild asthma, mucous production, bronchiectasis, abnormal CT,

## 2022-05-04 NOTE — PHYSICAL EXAM
[No Acute Distress] : no acute distress [Well Nourished] : well nourished [Well Groomed] : well groomed [Well Developed] : well developed [Normal Oropharynx] : normal oropharynx [Normal Appearance] : normal appearance [Supple] : supple [No Neck Mass] : no neck mass [No JVD] : no jvd [Normal Rate/Rhythm] : normal rate/rhythm [Murmur ___ / 6] : murmur [unfilled] / 6 [Normal S1, S2] : normal s1, s2 [No Murmurs] : no murmurs [No Resp Distress] : no resp distress [No Acc Muscle Use] : no acc muscle use [Normal Palpation] : normal palpation [Normal Rhythm and Effort] : normal rhythm and effort [Clear to Auscultation Bilaterally] : clear to auscultation bilaterally [No Abnormalities] : no abnormalities [Benign] : benign [Normal Gait] : normal gait [No Clubbing] : no clubbing [No Cyanosis] : no cyanosis [No Edema] : no edema [FROM] : FROM [No Focal Deficits] : no focal deficits [Normal Color/ Pigmentation] : normal color/ pigmentation [Oriented x3] : oriented x3 [Normal Mood] : normal mood [Normal Affect] : normal affect [III] : Mallampati Class: III [TextBox_2] : right eye semi-closed  [TextBox_68] : I:E 1:3; Clear

## 2022-05-04 NOTE — PROCEDURE
[FreeTextEntry1] : Full PFT revealed normal flows, with a FEV1 of 3.04 L, which is 111% of predicted, normal lung volumes, and a diffusion of 18.6, which is 99% of predicted, with a normal flow volume loop \par  \par Feno was 24; a normal value being less than 25. Fractional exhaled nitric oxide (FENO) is regarded as a simple, noninvasive method for assessing eosinophilic airway inflammation. Produced by a variety of cells within the lung, nitric oxide (NO) concentrations are generally low in healthy individuals. However, high concentrations of NO appear to be involved in nonspecific host defense mechanisms and chronic inflammatory  diseases such as asthma. The American Thoracic Society (ATS) therefore recommended using FENO to aid in the diagnosis and monitoring of eosinophilic airway inflammation and asthma, and for identifying steroid responsive individuals whose chronic respiratory symptoms may be caused by airway inflammation \par \par CT (APR.5.2021) Minimal mucoid impaction and atelectasis within the medial segment of right middle lobe is unchanged from 2020. No suspicious lung nodule.\par \par 6 minute walk test reveals a low saturation of 97% with no evidence of dyspnea or fatigue; walked 407.5 meters\par  \par \par

## 2022-05-04 NOTE — ASSESSMENT
[FreeTextEntry1] : This is a 75 year old male with PMHx of Afib, diverticulitis, bladder cancer s/p bladder tumor resection in 7/2020, Hep C, LVH, dilated aortic root, TAA, sciatica and, abnormal CT chest revealing bronchiectasis who is in for follow up evaluation. - persistent mucous production / SOB \par \par \par The patient's SOB is felt to be multifactorial:\par -poor mechanics of breathing\par -out of shape/overweight\par -Pulmonary\par    - mild asthma\par -Cardiac\par \par Problem 1: mild asthma \par -add Trelegy (100) 1 inhalation qD \par - Asthma is believed to be caused by inherited (genetic) and environmental factor, but its exact cause is unknown. asthma may be triggered by allergens, lung infections, or irritants in the air. Asthma triggers are different for each person\par - Inhaler technique reviewed as well as oral hygiene technique reviewed with patient. Avoidance of cold air, extremes of temperature, rescue inhaler should be used before exercise. Order of medication reviewed with patient. Recommended use of a cool mist humidifier in the bedroom. \par \par Problem 2: chronic mucous production ddx\par 1. allergies / sinus \par 2. asthma \par 3. LPR \par \par Problem 2: allergies / sinus \par Script given for blood work: asthma profile, food IgE panel, eosinophil level, IgE level, Vitamin D level\par -Environmental measures for allergies were encouraged including mattress and pillow cover, air purifier, and environmental controls.\par \par \par Problem 3: .Bronchiectasis and focal RML volume loss\par - Complete strep pneumonia titers, quantitative immunoglobulins, IgG subclasses\par - Get sputum cultures AFBx3 \par - Follow up CT scan in 6 months to re-assess\par -correlation with smoking hx\par -can contribute to cough/mucus\par -discussed condition in detail with patient\par -offered acapella to patient- not interested in use \par - Seen on the CT of the chest or chest x-ray signifies damaged bronchial tubes focal or diffuse which can be sites of recurrent infections. These areas can be colonized by various organisms including bacteria (hemophilous influenza/Pseudomonas species etc.) as well as acid fast bacilli (myobacterial disease- inclusive of TB/KELSIE etc.). Sputum either for bacteria culture/sensitivity or AFB culture and sensitivity will need to be sent if the patient has sputum- 3 specimens on consecutive days will need to be dropped at the laboratory- if the patient can produce sputum. \par \par Problem 3A: Abnormal CT - Bronchiectasis \par - f/p CT from [place] ([facility]) \par - CAT scans are the only radiological modality to identify abnormality to identify abnormalities w/in the lings with regards to nodules/masses/lymph nodes. Risks, benefits were reviewed in detail. The guidelines for abnormalities include follow up CT scans at various intervals which could range from 6 weeks to 1 year intervals. If there is a change for the worse then considerations for a biopsy will be considered if you are a candidate. Second opinion evaluation with thoracic surgeon or an interventional radiologist could be offered, \par \par Problem 4.GERD/LPR-stable\par -add Pepcid 40 mg QHS\par -Rule of 2s: avoid eating too much, eating too late, eating too spicy, eating two hours before bed.\par - Things to avoid including overeating, spicy foods, tight clothing, eating within two hours of bed, this list is not all inclusive.\par - For treatments of reflux, possible options discussed including diet control, H2 blockers, PPIs, as well as coating motility agents discussed as treatment options. Timing of meals and proximity of last meal to sleep were discussed. If symptoms persist, a formal gastrointestinal evaluation is needed.\par \par Problem 5: Cardiac \par - recommended follow up with cardiologist if needed / Dr. Jake Ward \par \par Problem 6: Poor sleep\par -related to bladder issues\par -reports last sleep test was 1 year ago and was negative for sleep apnea\par \par \par Problem 7 : Health Maintenance\par -s/p flu shot\par -recommended strep pneumonia vaccines: Prevnar-13 vaccine, follow by Pneumo vaccine 23 one year following\par -recommended early intervention for URIs\par -recommended regular osteoporosis evaluations\par -recommended early dermatological evaluations\par -recommended after the age of 50 to the age of 70, colonoscopy every 5 years\par \par f/u in 6-8 weeks\par pt is encouraged to call or fax the office with any questions or concerns.\par \par \par

## 2022-05-04 NOTE — ADDENDUM
[FreeTextEntry1] : Documented by Olga Gilman acting as a scribe for Dr. Matt Loera on (05/04/2022).\par \par All medical record entries made by the Scribe were at my, Dr. Matt Loera's, direction and personally dictated by me on (05/04/2022). I have reviewed the chart and agree that the record accurately reflects my personal performance of the history, physical exam, assessment and plan. I have also personally directed, reviewed, and agree with the discharge instructions.\par

## 2022-05-04 NOTE — HISTORY OF PRESENT ILLNESS
[TextBox_4] : This is a 76 year old male with PMHx of bladder cancer s/p bladder tumor resection in 7/2020, Hep C, LVH, dilated aortic root, sciatica and now an abnormal CT chest revealing bronchiectasis who is in for follow up evaluation. \par - He notes he has had surgery multiple times since he was last in this office. \par - he had heart fibrillation correction, he also had a colon repair recently. He had a reconstruction on his eye. \par - he notes he had a sleep apnea test done and was negative \par - no heart burn / reflux \par - no difficulty swallowing \par - he notes he brings up a lot of mucous. \par - he notes mucous goes into his throat. \par - he notes he was once put on Nexium but he is no longer taking it \par - does not feel a lump in the throat to clear \par - he feels like mucous drops from bottom up \par - he notes on the lower part of his legs he has issues, if he stands up for 8 minutes and he will get a tingling sensation and then falls. \par - He notes his doctors are trying to find out the cause of this issue. \par - he notes no SOB upon stairs or inclines \par - he notes no issues with breathing \par - patient denies any headaches, nausea, vomiting, fever, chills, sweats, chest pain, chest pressure, palpitations, coughing, wheezing, fatigue, diarrhea, constipation, dysphagia, myalgias, dizziness, leg swelling, leg pain, itchy eyes, itchy ears, heartburn, reflux or sour taste in the mouth

## 2022-05-05 LAB
24R-OH-CALCIDIOL SERPL-MCNC: 56.7 PG/ML
25(OH)D3 SERPL-MCNC: 33.4 NG/ML
BASOPHILS # BLD AUTO: 0.06 K/UL
BASOPHILS NFR BLD AUTO: 0.9 %
DEPRECATED KAPPA LC FREE/LAMBDA SER: 1.24 RATIO
EOSINOPHIL # BLD AUTO: 0.17 K/UL
EOSINOPHIL NFR BLD AUTO: 2.4 %
HCT VFR BLD CALC: 46.2 %
HGB BLD-MCNC: 15 G/DL
IGA SER QL IEP: 354 MG/DL
IGG SER QL IEP: 854 MG/DL
IGM SER QL IEP: 28 MG/DL
IMM GRANULOCYTES NFR BLD AUTO: 0.3 %
KAPPA LC CSF-MCNC: 2.01 MG/DL
KAPPA LC SERPL-MCNC: 2.49 MG/DL
LYMPHOCYTES # BLD AUTO: 2.26 K/UL
LYMPHOCYTES NFR BLD AUTO: 32.1 %
MAN DIFF?: NORMAL
MCHC RBC-ENTMCNC: 32.5 GM/DL
MCHC RBC-ENTMCNC: 34.4 PG
MCV RBC AUTO: 106 FL
MONOCYTES # BLD AUTO: 0.64 K/UL
MONOCYTES NFR BLD AUTO: 9.1 %
NEUTROPHILS # BLD AUTO: 3.89 K/UL
NEUTROPHILS NFR BLD AUTO: 55.2 %
PLATELET # BLD AUTO: 184 K/UL
RBC # BLD: 4.36 M/UL
RBC # FLD: 12.4 %
WBC # FLD AUTO: 7.04 K/UL

## 2022-05-06 LAB
DEPRECATED S PNEUM 1 IGG SER-MCNC: 6.9 MCG/ML
DEPRECATED S PNEUM12 AB SER-ACNC: 2.9 MCG/ML
DEPRECATED S PNEUM14 AB SER-ACNC: 1.4 MCG/ML
DEPRECATED S PNEUM17 IGG SER IA-MCNC: 66 MCG/ML
DEPRECATED S PNEUM18 IGG SER IA-MCNC: 1.1 MCG/ML
DEPRECATED S PNEUM19 IGG SER-MCNC: 3.4 MCG/ML
DEPRECATED S PNEUM19 IGG SER-MCNC: 6.3 MCG/ML
DEPRECATED S PNEUM2 IGG SER-MCNC: 11.5 MCG/ML
DEPRECATED S PNEUM20 IGG SER-MCNC: 0.6 MCG/ML
DEPRECATED S PNEUM22 IGG SER-MCNC: 1.8 MCG/ML
DEPRECATED S PNEUM23 AB SER-ACNC: 2 MCG/ML
DEPRECATED S PNEUM3 AB SER-ACNC: 0.4 MCG/ML
DEPRECATED S PNEUM34 IGG SER-MCNC: 0.4 MCG/ML
DEPRECATED S PNEUM4 AB SER-ACNC: 0.7 MCG/ML
DEPRECATED S PNEUM5 IGG SER-MCNC: 2.3 MCG/ML
DEPRECATED S PNEUM6 IGG SER-MCNC: 1.4 MCG/ML
DEPRECATED S PNEUM7 IGG SER-ACNC: 3.1 MCG/ML
DEPRECATED S PNEUM8 AB SER-ACNC: 2.7 MCG/ML
DEPRECATED S PNEUM9 AB SER-ACNC: NORMAL MCG/ML
DEPRECATED S PNEUM9 IGG SER-MCNC: 40.5 MCG/ML
STREPTOCOCCUS PNEUMONIAE SEROTYPE 11A: 1.5 MCG/ML
STREPTOCOCCUS PNEUMONIAE SEROTYPE 15B: 1.6 MCG/ML
STREPTOCOCCUS PNEUMONIAE SEROTYPE 33F: 13.1 MCG/ML

## 2022-05-07 LAB
A ALTERNATA IGE QN: <0.1 KUA/L
A ALTERNATA IGE QN: <0.1 KUA/L
A FUMIGATUS IGE QN: <0.1 KUA/L
A FUMIGATUS IGE QN: <0.1 KUA/L
BERMUDA GRASS IGE QN: <0.1 KUA/L
BOXELDER IGE QN: <0.1 KUA/L
C ALBICANS IGE QN: <0.1 KUA/L
C HERBARUM IGE QN: <0.1 KUA/L
C HERBARUM IGE QN: <0.1 KUA/L
CALIF WALNUT IGE QN: <0.1 KUA/L
CAT DANDER IGE QN: <0.1 KUA/L
CAT DANDER IGE QN: <0.1 KUA/L
CLAM IGE QN: <0.1 KUA/L
CMN PIGWEED IGE QN: <0.1 KUA/L
CODFISH IGE QN: <0.1 KUA/L
COMMON RAGWEED IGE QN: <0.1 KUA/L
COMMON RAGWEED IGE QN: <0.1 KUA/L
CORN IGE QN: <0.1 KUA/L
COTTONWOOD IGE QN: <0.1 KUA/L
COW MILK IGE QN: <0.1 KUA/L
D FARINAE IGE QN: <0.1 KUA/L
D FARINAE IGE QN: <0.1 KUA/L
D PTERONYSS IGE QN: <0.1 KUA/L
D PTERONYSS IGE QN: <0.1 KUA/L
DEPRECATED A ALTERNATA IGE RAST QL: 0
DEPRECATED A ALTERNATA IGE RAST QL: 0
DEPRECATED A FUMIGATUS IGE RAST QL: 0
DEPRECATED A FUMIGATUS IGE RAST QL: 0
DEPRECATED BERMUDA GRASS IGE RAST QL: 0
DEPRECATED BOXELDER IGE RAST QL: 0
DEPRECATED C ALBICANS IGE RAST QL: 0
DEPRECATED C HERBARUM IGE RAST QL: 0
DEPRECATED C HERBARUM IGE RAST QL: 0
DEPRECATED CAT DANDER IGE RAST QL: 0
DEPRECATED CAT DANDER IGE RAST QL: 0
DEPRECATED CLAM IGE RAST QL: 0
DEPRECATED CODFISH IGE RAST QL: 0
DEPRECATED COMMON PIGWEED IGE RAST QL: 0
DEPRECATED COMMON RAGWEED IGE RAST QL: 0
DEPRECATED COMMON RAGWEED IGE RAST QL: 0
DEPRECATED CORN IGE RAST QL: 0
DEPRECATED COTTONWOOD IGE RAST QL: 0
DEPRECATED COW MILK IGE RAST QL: 0
DEPRECATED D FARINAE IGE RAST QL: 0
DEPRECATED D FARINAE IGE RAST QL: 0
DEPRECATED D PTERONYSS IGE RAST QL: 0
DEPRECATED D PTERONYSS IGE RAST QL: 0
DEPRECATED DOG DANDER IGE RAST QL: 0
DEPRECATED DOG DANDER IGE RAST QL: 0
DEPRECATED DUCK FEATHER IGE RAST QL: 0
DEPRECATED EGG WHITE IGE RAST QL: 0
DEPRECATED GOOSE FEATHER IGE RAST QL: 0
DEPRECATED GOOSEFOOT IGE RAST QL: 0
DEPRECATED LONDON PLANE IGE RAST QL: 0
DEPRECATED M RACEMOSUS IGE RAST QL: 0
DEPRECATED MOUSE URINE PROT IGE RAST QL: 0
DEPRECATED MUGWORT IGE RAST QL: 0
DEPRECATED P NOTATUM IGE RAST QL: 0
DEPRECATED PEANUT IGE RAST QL: 0
DEPRECATED RED CEDAR IGE RAST QL: 0
DEPRECATED ROACH IGE RAST QL: 0
DEPRECATED ROACH IGE RAST QL: 0
DEPRECATED SCALLOP IGE RAST QL: <0.1 KUA/L
DEPRECATED SESAME SEED IGE RAST QL: 0
DEPRECATED SHEEP SORREL IGE RAST QL: 0
DEPRECATED SHRIMP IGE RAST QL: 0
DEPRECATED SILVER BIRCH IGE RAST QL: 0
DEPRECATED SOYBEAN IGE RAST QL: 0
DEPRECATED TIMOTHY IGE RAST QL: 0
DEPRECATED TIMOTHY IGE RAST QL: 0
DEPRECATED WALNUT IGE RAST QL: 0
DEPRECATED WHEAT IGE RAST QL: 0
DEPRECATED WHITE ASH IGE RAST QL: 0
DEPRECATED WHITE OAK IGE RAST QL: 0
DEPRECATED WHITE OAK IGE RAST QL: 0
DOG DANDER IGE QN: <0.1 KUA/L
DOG DANDER IGE QN: <0.1 KUA/L
DUCK FEATHER IGE QN: <0.1 KUA/L
EGG WHITE IGE QN: <0.1 KUA/L
GOOSE FEATHER IGE QN: <0.1 KUA/L
GOOSEFOOT IGE QN: <0.1 KUA/L
LONDON PLANE IGE QN: <0.1 KUA/L
M RACEMOSUS IGE QN: <0.1 KUA/L
MOUSE URINE PROT IGE QN: <0.1 KUA/L
MUGWORT IGE QN: <0.1 KUA/L
MULBERRY (T70) CLASS: 0
MULBERRY (T70) CONC: <0.1 KUA/L
P NOTATUM IGE QN: <0.1 KUA/L
PEANUT IGE QN: <0.1 KUA/L
RED CEDAR IGE QN: <0.1 KUA/L
ROACH IGE QN: <0.1 KUA/L
ROACH IGE QN: <0.1 KUA/L
SCALLOP IGE QN: 0
SCALLOP IGE QN: <0.1 KUA/L
SESAME SEED IGE QN: <0.1 KUA/L
SHEEP SORREL IGE QN: <0.1 KUA/L
SILVER BIRCH IGE QN: <0.1 KUA/L
SOYBEAN IGE QN: <0.1 KUA/L
TIMOTHY IGE QN: <0.1 KUA/L
TIMOTHY IGE QN: <0.1 KUA/L
TOTAL IGE SMQN RAST: 23 KU/L
TREE ALLERG MIX1 IGE QL: 0
WALNUT IGE QN: <0.1 KUA/L
WHEAT IGE QN: <0.1 KUA/L
WHITE ASH IGE QN: <0.1 KUA/L
WHITE ELM IGE QN: 0
WHITE ELM IGE QN: <0.1 KUA/L
WHITE OAK IGE QN: <0.1 KUA/L
WHITE OAK IGE QN: <0.1 KUA/L

## 2022-05-07 NOTE — HISTORY OF PRESENT ILLNESS
[FreeTextEntry1] : I was asked to see this delightful patient today for Pre-op Evaluation  prior to  cystoscopy bladder biopsy and fulguration with   Dr. Caicedo  at fax number [phone]  .  The patient denies fever, cough, wheezing, sputum production, hemoptysis, dyspnea, congestion, diarrhea, constipation, nausea, vomiting, bright red blood per rectum, melena, angina, chest pain, palpitations, diaphoresis, PND, incontinence, frequency, urgency, dysuria, edema, joint pain, headache, weakness, numbness and dizziness. The date of the planned procedure is: 5/17/2022\par  \par Pt saw Dr. Loera this morning for f/u for mild asthma, mucous production and bronchiectasis. Pt was given Trelegy Ellipta. Multiple blood tests ordered to test for allergies. \par \par The patient presents for evaluation of high blood pressure. Patient is currently tolerating the current antihypertensive regime and they deny headaches, stiff neck, visual changes, pedal Edema or PND. They also are here for follow-up of elevated cholesterol. Patient is currently tolerating medication and and does not complain of new muscle pain, joint pain, back pain,urinary changes ,nausea, vomiting, abdominal pain or diarrhea. The patient is trying to follow a low cholesterol diet. \par \par The patient is also here for follow-up of atrial fibrillation and currently they feel well with an occasional episode of palpitations. The patient states they are reliably taking the anticoagulation medicine and are not having any signs of bleeding they deny any dizziness headaches nosebleeds or black tarry stools \par \par Pt is s/p HENRRY/DCCV March 2021, had ablation in October. Pt is followed by Dr. Morgan\par \par Pt w/ hx of bladder CA, scheduled for cystoscopy 1/18/2022\par \par pt is here for follow up of TAA they have been taking medication as prescribed and blood pressure have been observed in the a reasonable range, their are no reports of SSCP , back pain or syncope. \par \par Pt states today that they had both covid 19 vaccine . pt had the COVID-19 vaccine without major side effects. The patient did experience mild fatigue headache and arm soreness. The patient denied any fever over 100.5. pt denies any recent sore throat, fever, chills loss of taste or smell. \par

## 2022-05-08 LAB
IGG SUBSET TOTAL IGG: 881 MG/DL
IGG1 SER-MCNC: 468 MG/DL
IGG2 SER-MCNC: 268 MG/DL
IGG3 SER-MCNC: 55 MG/DL
IGG4 SER-MCNC: 45 MG/DL

## 2022-05-09 ENCOUNTER — LABORATORY RESULT (OUTPATIENT)
Age: 77
End: 2022-05-09

## 2022-05-09 ENCOUNTER — RX RENEWAL (OUTPATIENT)
Age: 77
End: 2022-05-09

## 2022-05-09 LAB
ALBUMIN SERPL ELPH-MCNC: 4.4 G/DL
ALP BLD-CCNC: 63 U/L
ALT SERPL-CCNC: 12 U/L
ANION GAP SERPL CALC-SCNC: 10 MMOL/L
AST SERPL-CCNC: 15 U/L
BILIRUB DIRECT SERPL-MCNC: 0.1 MG/DL
BILIRUB INDIRECT SERPL-MCNC: 0.2 MG/DL
BILIRUB SERPL-MCNC: 0.3 MG/DL
BUN SERPL-MCNC: 21 MG/DL
CALCIUM SERPL-MCNC: 9.5 MG/DL
CHLORIDE SERPL-SCNC: 108 MMOL/L
CHOLEST SERPL-MCNC: 150 MG/DL
CK SERPL-CCNC: 43 U/L
CO2 SERPL-SCNC: 24 MMOL/L
COVID-19 SPIKE DOMAIN ANTIBODY INTERPRETATION: POSITIVE
CREAT SERPL-MCNC: 0.84 MG/DL
EGFR: 90 ML/MIN/1.73M2
ESTIMATED AVERAGE GLUCOSE: 148 MG/DL
GLUCOSE SERPL-MCNC: 105 MG/DL
HBA1C MFR BLD HPLC: 6.8 %
HDLC SERPL-MCNC: 50 MG/DL
LDLC SERPL CALC-MCNC: 82 MG/DL
LDLC SERPL DIRECT ASSAY-MCNC: 84 MG/DL
NONHDLC SERPL-MCNC: 100 MG/DL
POTASSIUM SERPL-SCNC: 5.1 MMOL/L
PROT SERPL-MCNC: 7.3 G/DL
SARS-COV-2 AB SERPL IA-ACNC: >250 U/ML
SODIUM SERPL-SCNC: 142 MMOL/L
TRIGL SERPL-MCNC: 91 MG/DL

## 2022-05-09 RX ORDER — METFORMIN HYDROCHLORIDE 500 MG/1
500 TABLET, COATED ORAL DAILY
Qty: 90 | Refills: 1 | Status: ACTIVE | COMMUNITY
Start: 2022-05-09 | End: 1900-01-01

## 2022-05-12 RX ORDER — LANCETS 33 GAUGE
EACH MISCELLANEOUS
Qty: 1 | Refills: 0 | Status: ACTIVE | COMMUNITY
Start: 2022-05-12 | End: 1900-01-01

## 2022-05-12 RX ORDER — BLOOD SUGAR DIAGNOSTIC
STRIP MISCELLANEOUS
Qty: 1 | Refills: 0 | Status: ACTIVE | COMMUNITY
Start: 2022-05-12 | End: 1900-01-01

## 2022-05-14 ENCOUNTER — OUTPATIENT (OUTPATIENT)
Dept: OUTPATIENT SERVICES | Facility: HOSPITAL | Age: 77
LOS: 1 days | End: 2022-05-14
Payer: MEDICARE

## 2022-05-14 DIAGNOSIS — Z11.52 ENCOUNTER FOR SCREENING FOR COVID-19: ICD-10-CM

## 2022-05-14 DIAGNOSIS — Z98.890 OTHER SPECIFIED POSTPROCEDURAL STATES: Chronic | ICD-10-CM

## 2022-05-14 DIAGNOSIS — Z98.89 OTHER SPECIFIED POSTPROCEDURAL STATES: Chronic | ICD-10-CM

## 2022-05-14 DIAGNOSIS — Z92.241 PERSONAL HISTORY OF SYSTEMIC STEROID THERAPY: Chronic | ICD-10-CM

## 2022-05-14 LAB — SARS-COV-2 RNA SPEC QL NAA+PROBE: SIGNIFICANT CHANGE UP

## 2022-05-14 PROCEDURE — U0005: CPT

## 2022-05-14 PROCEDURE — U0003: CPT

## 2022-05-14 PROCEDURE — C9803: CPT

## 2022-05-16 ENCOUNTER — TRANSCRIPTION ENCOUNTER (OUTPATIENT)
Age: 77
End: 2022-05-16

## 2022-05-17 ENCOUNTER — APPOINTMENT (OUTPATIENT)
Dept: UROLOGY | Facility: HOSPITAL | Age: 77
End: 2022-05-17

## 2022-05-17 ENCOUNTER — TRANSCRIPTION ENCOUNTER (OUTPATIENT)
Age: 77
End: 2022-05-17

## 2022-05-17 ENCOUNTER — OUTPATIENT (OUTPATIENT)
Dept: OUTPATIENT SERVICES | Facility: HOSPITAL | Age: 77
LOS: 1 days | End: 2022-05-17
Payer: MEDICARE

## 2022-05-17 ENCOUNTER — RESULT REVIEW (OUTPATIENT)
Age: 77
End: 2022-05-17

## 2022-05-17 VITALS
RESPIRATION RATE: 16 BRPM | DIASTOLIC BLOOD PRESSURE: 79 MMHG | SYSTOLIC BLOOD PRESSURE: 111 MMHG | OXYGEN SATURATION: 97 % | HEART RATE: 85 BPM | WEIGHT: 179.9 LBS | HEIGHT: 68 IN | TEMPERATURE: 98 F

## 2022-05-17 VITALS
HEART RATE: 68 BPM | OXYGEN SATURATION: 97 % | SYSTOLIC BLOOD PRESSURE: 122 MMHG | RESPIRATION RATE: 16 BRPM | DIASTOLIC BLOOD PRESSURE: 61 MMHG | TEMPERATURE: 97 F

## 2022-05-17 DIAGNOSIS — Z98.890 OTHER SPECIFIED POSTPROCEDURAL STATES: Chronic | ICD-10-CM

## 2022-05-17 DIAGNOSIS — Z98.89 OTHER SPECIFIED POSTPROCEDURAL STATES: Chronic | ICD-10-CM

## 2022-05-17 DIAGNOSIS — Z92.241 PERSONAL HISTORY OF SYSTEMIC STEROID THERAPY: Chronic | ICD-10-CM

## 2022-05-17 DIAGNOSIS — C67.9 MALIGNANT NEOPLASM OF BLADDER, UNSPECIFIED: ICD-10-CM

## 2022-05-17 PROCEDURE — 88112 CYTOPATH CELL ENHANCE TECH: CPT | Mod: 26

## 2022-05-17 PROCEDURE — 52000 CYSTOURETHROSCOPY: CPT

## 2022-05-17 PROCEDURE — 88112 CYTOPATH CELL ENHANCE TECH: CPT

## 2022-05-17 RX ORDER — SODIUM CHLORIDE 9 MG/ML
1000 INJECTION, SOLUTION INTRAVENOUS
Refills: 0 | Status: DISCONTINUED | OUTPATIENT
Start: 2022-05-17 | End: 2022-05-31

## 2022-05-17 RX ORDER — ONDANSETRON 8 MG/1
4 TABLET, FILM COATED ORAL ONCE
Refills: 0 | Status: DISCONTINUED | OUTPATIENT
Start: 2022-05-17 | End: 2022-05-31

## 2022-05-17 RX ORDER — LIDOCAINE HCL 20 MG/ML
0.2 VIAL (ML) INJECTION ONCE
Refills: 0 | Status: COMPLETED | OUTPATIENT
Start: 2022-05-17 | End: 2022-05-17

## 2022-05-17 RX ORDER — CEFAZOLIN SODIUM 1 G
2000 VIAL (EA) INJECTION ONCE
Refills: 0 | Status: COMPLETED | OUTPATIENT
Start: 2022-05-17 | End: 2022-05-17

## 2022-05-17 RX ADMIN — SODIUM CHLORIDE 100 MILLILITER(S): 9 INJECTION, SOLUTION INTRAVENOUS at 10:06

## 2022-05-17 NOTE — ASU PATIENT PROFILE, ADULT - FALL HARM RISK - UNIVERSAL INTERVENTIONS
Bed in lowest position, wheels locked, appropriate side rails in place/Call bell, personal items and telephone in reach/Instruct patient to call for assistance before getting out of bed or chair/Non-slip footwear when patient is out of bed/[place] to call system/Physically safe environment - no spills, clutter or unnecessary equipment/Purposeful Proactive Rounding/Room/bathroom lighting operational, light cord in reach

## 2022-05-17 NOTE — PRE-ANESTHESIA EVALUATION ADULT - NSANTHOSAYNRD_GEN_A_CORE
sleep study approx 1 year ago at [facility] Study, patient states negative study/No. TONY screening performed.  STOP BANG Legend: 0-2 = LOW Risk; 3-4 = INTERMEDIATE Risk; 5-8 = HIGH Risk

## 2022-05-17 NOTE — ASU PREOP CHECKLIST - DENTURES
History     Chief Complaint   Patient presents with     Pharyngitis     sore throat since yesterday     HPI  Phoenix Knutson is a 9 year old female who presents to the urgent care with concern over sore throat which been present for the last 24 hours.  Patient initially complains of nasal congestion and abdominal discomfort.  She has not had any recent fevers, chills, myalgias, cough, dyspnea, wheezing, vomiting, diarrhea.  She did have a contact at school who tested positive for strep throat within the last week.  She denies any known contacts with confirmed COVID-19    Allergies:  Allergies   Allergen Reactions     No Clinical Screening - See Comments Rash     Coppertone sun tan lotion     Problem List:    There are no active problems to display for this patient.     Past Medical History:    No past medical history on file.    Past Surgical History:    No past surgical history on file.    Family History:    No family history on file.    Social History:  Marital Status:  Single [1]  Social History     Tobacco Use     Smoking status: Passive Smoke Exposure - Never Smoker     Smokeless tobacco: Never Used   Substance Use Topics     Alcohol use: No     Drug use: No      Medications:    clotrimazole (LOTRIMIN) 1 % cream      Review of Systems  CONSTITUTIONAL:NEGATIVE for fever, chills, change in weight  INTEGUMENTARY/SKIN: NEGATIVE for worrisome rashes, moles or lesions  EYES: NEGATIVE for vision changes or irritation  ENT/MOUTH: POSITIVE for sore throat, nasal congestion  and NEGATIVE for ear pain   RESP:NEGATIVE for significant cough or SOB  GI: POSITIVE for abdominal pain  and NEGATIVE for vomiting, diarrhea   Physical Exam   Pulse: 94  Temp: 97.9  F (36.6  C)  Resp: 16  Weight: 47.4 kg (104 lb 8 oz)  SpO2: 100 %  Physical Exam  GENERAL APPEARANCE: healthy, alert and no distress  EYES: EOMI,  PERRL, conjunctiva clear  HENT: ear canals and TM's normal.  Nose and mouth without ulcers, erythema or lesions  NECK:  supple, nontender, no lymphadenopathy  RESP: lungs clear to auscultation - no rales, rhonchi or wheezes  CV: regular rates and rhythm, normal S1 S2, no murmur noted  ABDOMEN:  soft, nontender, no HSM or masses and bowel sounds normal  SKIN: no suspicious lesions or rashes  ED Course        Procedures        Critical Care time:  none          Results for orders placed or performed during the hospital encounter of 09/24/20 (from the past 24 hour(s))   Streptococcus A Rapid Scr w Reflx to PCR    Specimen: Throat   Result Value Ref Range    Strep Specimen Description Throat     Streptococcus Group A Rapid Screen Negative NEG^Negative     Medications - No data to display    Assessments & Plan (with Medical Decision Making)     I have reviewed the nursing notes.  I have reviewed the findings, diagnosis, plan and need for follow up with the patient.     Discharge Medication List as of 9/24/2020  4:35 PM        Final diagnoses:   Acute pharyngitis, unspecified etiology     RST negative with culture pending.  No evidence of peritonsillar cellulitis or abscess.   I do not suspect mono.  Symptoms most likely consistent with viral illness.   Due to symptoms and school restrictions she was tested for COVID-19 prior to discharge.  Family  was instructed to continue OTC symptomatic treatment.  Follow up with PCP if no improvement in 5-7 days.  Worrisome reasons to seek care sooner discussed.      Disclaimer: This note consists of symbols derived from keyboarding, dictation, and/or voice recognition software. As a result, there may be errors in the script that have gone undetected.  Please consider this when interpreting information found in the chart.    9/24/2020   [facility] EMERGENCY DEPARTMENT     Chanel Powers PA-C  09/24/20 [id number]     no

## 2022-05-17 NOTE — ASU DISCHARGE PLAN (ADULT/PEDIATRIC) - CARE PROVIDER_API CALL
Todd Caicedo)  Urology  [address], [place] [address]  Phone: [phone]  Fax: [phone]  Follow Up Time:

## 2022-05-17 NOTE — ASU PATIENT PROFILE, ADULT - FALL HARM RISK - HARM RISK INTERVENTIONS

## 2022-05-17 NOTE — ASU DISCHARGE PLAN (ADULT/PEDIATRIC) - ASU DC SPECIAL INSTRUCTIONSFT
You may take over the counter pain medicine as needed for pain.     Please followup with Dr. Caicedo at your regular appointment interval. Please call for appointment.

## 2022-05-17 NOTE — ASU DISCHARGE PLAN (ADULT/PEDIATRIC) - NS MD DC FALL RISK RISK
For information on Fall & Injury Prevention, visit: [email or web address] OR  [email or web address] OR  https://www.cdc.gov/steadi/patient.html

## 2022-05-17 NOTE — PRE-ANESTHESIA EVALUATION ADULT - NSANTHPMHFT_GEN_ALL_CORE
77 yo M w/ PMHx of bladder cancer s/p bladder tumor resection in 7/2020, Hep C, LVH, dilated aortic root, sciatica, DM, AFib s/p ablation, bronchiectasis who presents for surveillance cystoscopy.    Denies active CP/SOB/Orthopnea  Denies hx of MI/CHF

## 2022-05-17 NOTE — ASU DISCHARGE PLAN (ADULT/PEDIATRIC) - NURSING INSTRUCTIONS
Next dose of Tylenol will be on or after ___0612PM________ ,today/tonight and every 6 hours afterwards for pain management, do not take any Tylenol containing products until this time. Your first dose of Tylenol was given at __1212PM_________. Do not exceed more than 4000mg of Tylenol in one 24 hour setting.

## 2022-05-17 NOTE — ASU PATIENT PROFILE, ADULT - NSICDXPASTMEDICALHX_GEN_ALL_CORE_FT
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 follow up after an year    Chronic back pain -on oxycodone prn and tylenol    Colon cancer     Epistaxis hospitalized 3/2021    Hepatitis C Pt was treated 7 years ago    History of sciatica leg pains    Lumbar disc disease     Malignant neoplasm of colon, unspecified part of colon s/p microsurgery at [facility]  no chemo/XRT in remission since 2012

## 2022-05-19 LAB — CFTR MUT TESTED BLD/T: NEGATIVE

## 2022-05-27 ENCOUNTER — NON-APPOINTMENT (OUTPATIENT)
Age: 77
End: 2022-05-27

## 2022-05-31 ENCOUNTER — OUTPATIENT (OUTPATIENT)
Dept: OUTPATIENT SERVICES | Facility: HOSPITAL | Age: 77
LOS: 1 days | End: 2022-05-31
Payer: MEDICARE

## 2022-05-31 ENCOUNTER — APPOINTMENT (OUTPATIENT)
Dept: CT IMAGING | Facility: IMAGING CENTER | Age: 77
End: 2022-05-31
Payer: MEDICARE

## 2022-05-31 DIAGNOSIS — Z00.8 ENCOUNTER FOR OTHER GENERAL EXAMINATION: ICD-10-CM

## 2022-05-31 DIAGNOSIS — I77.810 THORACIC AORTIC ECTASIA: ICD-10-CM

## 2022-05-31 DIAGNOSIS — Z98.890 OTHER SPECIFIED POSTPROCEDURAL STATES: Chronic | ICD-10-CM

## 2022-05-31 DIAGNOSIS — Z92.241 PERSONAL HISTORY OF SYSTEMIC STEROID THERAPY: Chronic | ICD-10-CM

## 2022-05-31 DIAGNOSIS — Z98.89 OTHER SPECIFIED POSTPROCEDURAL STATES: Chronic | ICD-10-CM

## 2022-05-31 PROCEDURE — 71250 CT THORAX DX C-: CPT | Mod: 26

## 2022-05-31 PROCEDURE — 71250 CT THORAX DX C-: CPT

## 2022-06-02 ENCOUNTER — NON-APPOINTMENT (OUTPATIENT)
Age: 77
End: 2022-06-02

## 2022-06-28 ENCOUNTER — APPOINTMENT (OUTPATIENT)
Dept: PEDIATRIC ALLERGY IMMUNOLOGY | Facility: CLINIC | Age: 77
End: 2022-06-28

## 2022-06-29 LAB — ACID FAST STN SPT: NORMAL

## 2022-07-01 ENCOUNTER — NON-APPOINTMENT (OUTPATIENT)
Age: 77
End: 2022-07-01

## 2022-07-27 ENCOUNTER — APPOINTMENT (OUTPATIENT)
Dept: CARDIOLOGY | Facility: CLINIC | Age: 77
End: 2022-07-27

## 2022-07-27 VITALS
WEIGHT: 181 LBS | OXYGEN SATURATION: 96 % | DIASTOLIC BLOOD PRESSURE: 70 MMHG | HEIGHT: 68 IN | SYSTOLIC BLOOD PRESSURE: 132 MMHG | BODY MASS INDEX: 27.43 KG/M2 | HEART RATE: 67 BPM

## 2022-07-27 DIAGNOSIS — I48.0 PAROXYSMAL ATRIAL FIBRILLATION: ICD-10-CM

## 2022-07-27 DIAGNOSIS — M54.30 SCIATICA, UNSPECIFIED SIDE: ICD-10-CM

## 2022-07-27 DIAGNOSIS — G62.9 POLYNEUROPATHY, UNSPECIFIED: ICD-10-CM

## 2022-07-27 PROCEDURE — 99214 OFFICE O/P EST MOD 30 MIN: CPT | Mod: 25

## 2022-07-27 PROCEDURE — 93000 ELECTROCARDIOGRAM COMPLETE: CPT

## 2022-07-28 LAB
ALBUMIN SERPL ELPH-MCNC: 4 G/DL
ALP BLD-CCNC: 73 U/L
ALT SERPL-CCNC: 16 U/L
ANION GAP SERPL CALC-SCNC: 9 MMOL/L
AST SERPL-CCNC: 17 U/L
BASOPHILS # BLD AUTO: 0.04 K/UL
BASOPHILS NFR BLD AUTO: 0.5 %
BILIRUB DIRECT SERPL-MCNC: 0.1 MG/DL
BILIRUB INDIRECT SERPL-MCNC: 0.2 MG/DL
BILIRUB SERPL-MCNC: 0.3 MG/DL
BUN SERPL-MCNC: 12 MG/DL
CALCIUM SERPL-MCNC: 9 MG/DL
CHLORIDE SERPL-SCNC: 105 MMOL/L
CHOLEST SERPL-MCNC: 160 MG/DL
CK SERPL-CCNC: 51 U/L
CO2 SERPL-SCNC: 25 MMOL/L
CREAT SERPL-MCNC: 0.79 MG/DL
EGFR: 92 ML/MIN/1.73M2
EOSINOPHIL # BLD AUTO: 0.23 K/UL
EOSINOPHIL NFR BLD AUTO: 3 %
ESTIMATED AVERAGE GLUCOSE: 114 MG/DL
GLUCOSE SERPL-MCNC: 84 MG/DL
HBA1C MFR BLD HPLC: 5.6 %
HCT VFR BLD CALC: 44.5 %
HDLC SERPL-MCNC: 48 MG/DL
HGB BLD-MCNC: 14.6 G/DL
IMM GRANULOCYTES NFR BLD AUTO: 0.3 %
LDLC SERPL CALC-MCNC: 95 MG/DL
LDLC SERPL DIRECT ASSAY-MCNC: 89 MG/DL
LYMPHOCYTES # BLD AUTO: 2.01 K/UL
LYMPHOCYTES NFR BLD AUTO: 26.1 %
MAN DIFF?: NORMAL
MCHC RBC-ENTMCNC: 32.8 GM/DL
MCHC RBC-ENTMCNC: 34.4 PG
MCV RBC AUTO: 104.7 FL
MONOCYTES # BLD AUTO: 0.81 K/UL
MONOCYTES NFR BLD AUTO: 10.5 %
NEUTROPHILS # BLD AUTO: 4.6 K/UL
NEUTROPHILS NFR BLD AUTO: 59.6 %
NONHDLC SERPL-MCNC: 112 MG/DL
PLATELET # BLD AUTO: 182 K/UL
POTASSIUM SERPL-SCNC: 4.3 MMOL/L
PROT SERPL-MCNC: 7.1 G/DL
RBC # BLD: 4.25 M/UL
RBC # FLD: 12.7 %
SODIUM SERPL-SCNC: 138 MMOL/L
TRIGL SERPL-MCNC: 86 MG/DL
WBC # FLD AUTO: 7.71 K/UL

## 2022-07-29 LAB
FOLATE SERPL-MCNC: >20 NG/ML
VIT B12 SERPL-MCNC: >2000 PG/ML

## 2022-07-30 NOTE — HISTORY OF PRESENT ILLNESS
[FreeTextEntry1] : The patient here for evaluation of high blood pressure. Patient is currently tolerating the current antihypertensive regime and they deny headaches, stiff neck, visual changes, or PND. The patient has been trying to stay on a low-sodium diet.\par \par The patient is here for follow-up of elevated cholesterol. Patient is currently tolerating medication and denies muscle pain, joint pain, back pain,  urinary changes , nausea, vomiting, abdominal pain or diarrhea. The patient is trying to follow a low cholesterol diet.\par \par The patient is also here for follow-up of atrial fibrillation and currently they feel well with an occasional episode of palpitations. The patient states they are reliably taking the anticoagulation medicine and are not having any signs of bleeding they deny any dizziness headaches nosebleeds or black tarry stools \par \par Pt is s/p HENRRY/DCCV March 2021, had ablation in October. Pt is followed by Dr. Morgan\par \par Pt w/ hx of bladder CA, scheduled for cystoscopy 1/18/2022\par \par He is following with pain management Dr. Randle for sciatica pain and is undergoing epidural on monday. \par \par He also is following with neurology for neuropathy and is taking cymbalta 60mg daily. \par \par pt is here for follow up of TAA they have been taking medication as prescribed and blood pressure have been observed in the a reasonable range, their are no reports of SSCP , back pain or syncope. \par \par Pt states today that they had three covid 19 vaccine . pt had the COVID-19 vaccine without major side effects. The patient did experience mild fatigue headache and arm soreness. The patient denied any fever over 100.5. pt denies any recent sore throat, fever, chills loss of taste or smell.

## 2022-08-30 ENCOUNTER — OUTPATIENT (OUTPATIENT)
Dept: OUTPATIENT SERVICES | Facility: HOSPITAL | Age: 77
LOS: 1 days | End: 2022-08-30
Payer: MEDICARE

## 2022-08-30 VITALS
HEIGHT: 68 IN | TEMPERATURE: 99 F | DIASTOLIC BLOOD PRESSURE: 80 MMHG | OXYGEN SATURATION: 98 % | RESPIRATION RATE: 16 BRPM | WEIGHT: 177.91 LBS | SYSTOLIC BLOOD PRESSURE: 126 MMHG | HEART RATE: 77 BPM

## 2022-08-30 DIAGNOSIS — C67.9 MALIGNANT NEOPLASM OF BLADDER, UNSPECIFIED: ICD-10-CM

## 2022-08-30 DIAGNOSIS — Z98.890 OTHER SPECIFIED POSTPROCEDURAL STATES: Chronic | ICD-10-CM

## 2022-08-30 DIAGNOSIS — Z01.818 ENCOUNTER FOR OTHER PREPROCEDURAL EXAMINATION: ICD-10-CM

## 2022-08-30 DIAGNOSIS — E11.9 TYPE 2 DIABETES MELLITUS WITHOUT COMPLICATIONS: ICD-10-CM

## 2022-08-30 DIAGNOSIS — Z98.89 OTHER SPECIFIED POSTPROCEDURAL STATES: Chronic | ICD-10-CM

## 2022-08-30 DIAGNOSIS — Z92.241 PERSONAL HISTORY OF SYSTEMIC STEROID THERAPY: Chronic | ICD-10-CM

## 2022-08-30 DIAGNOSIS — I48.20 CHRONIC ATRIAL FIBRILLATION, UNSPECIFIED: ICD-10-CM

## 2022-08-30 DIAGNOSIS — Z85.51 PERSONAL HISTORY OF MALIGNANT NEOPLASM OF BLADDER: ICD-10-CM

## 2022-08-30 LAB
A1C WITH ESTIMATED AVERAGE GLUCOSE RESULT: 5.7 % — HIGH (ref 4–5.6)
ANION GAP SERPL CALC-SCNC: 11 MMOL/L — SIGNIFICANT CHANGE UP (ref 5–17)
BUN SERPL-MCNC: 12 MG/DL — SIGNIFICANT CHANGE UP (ref 7–23)
CALCIUM SERPL-MCNC: 9.6 MG/DL — SIGNIFICANT CHANGE UP (ref 8.4–10.5)
CHLORIDE SERPL-SCNC: 102 MMOL/L — SIGNIFICANT CHANGE UP (ref 96–108)
CO2 SERPL-SCNC: 25 MMOL/L — SIGNIFICANT CHANGE UP (ref 22–31)
CREAT SERPL-MCNC: 0.85 MG/DL — SIGNIFICANT CHANGE UP (ref 0.5–1.3)
EGFR: 90 ML/MIN/1.73M2 — SIGNIFICANT CHANGE UP
ESTIMATED AVERAGE GLUCOSE: 117 MG/DL — HIGH (ref 68–114)
GLUCOSE SERPL-MCNC: 91 MG/DL — SIGNIFICANT CHANGE UP (ref 70–99)
HCT VFR BLD CALC: 45 % — SIGNIFICANT CHANGE UP (ref 39–50)
HGB BLD-MCNC: 15 G/DL — SIGNIFICANT CHANGE UP (ref 13–17)
MCHC RBC-ENTMCNC: 33.3 GM/DL — SIGNIFICANT CHANGE UP (ref 32–36)
MCHC RBC-ENTMCNC: 34 PG — SIGNIFICANT CHANGE UP (ref 27–34)
MCV RBC AUTO: 102 FL — HIGH (ref 80–100)
NRBC # BLD: 0 /100 WBCS — SIGNIFICANT CHANGE UP (ref 0–0)
PLATELET # BLD AUTO: 196 K/UL — SIGNIFICANT CHANGE UP (ref 150–400)
POTASSIUM SERPL-MCNC: 4.6 MMOL/L — SIGNIFICANT CHANGE UP (ref 3.5–5.3)
POTASSIUM SERPL-SCNC: 4.6 MMOL/L — SIGNIFICANT CHANGE UP (ref 3.5–5.3)
RBC # BLD: 4.41 M/UL — SIGNIFICANT CHANGE UP (ref 4.2–5.8)
RBC # FLD: 12.6 % — SIGNIFICANT CHANGE UP (ref 10.3–14.5)
SODIUM SERPL-SCNC: 138 MMOL/L — SIGNIFICANT CHANGE UP (ref 135–145)
WBC # BLD: 5.74 K/UL — SIGNIFICANT CHANGE UP (ref 3.8–10.5)
WBC # FLD AUTO: 5.74 K/UL — SIGNIFICANT CHANGE UP (ref 3.8–10.5)

## 2022-08-30 PROCEDURE — 80048 BASIC METABOLIC PNL TOTAL CA: CPT

## 2022-08-30 PROCEDURE — 83036 HEMOGLOBIN GLYCOSYLATED A1C: CPT

## 2022-08-30 PROCEDURE — G0463: CPT

## 2022-08-30 PROCEDURE — 87086 URINE CULTURE/COLONY COUNT: CPT

## 2022-08-30 PROCEDURE — 85027 COMPLETE CBC AUTOMATED: CPT

## 2022-08-30 RX ORDER — SODIUM CHLORIDE 9 MG/ML
3 INJECTION INTRAMUSCULAR; INTRAVENOUS; SUBCUTANEOUS EVERY 8 HOURS
Refills: 0 | Status: DISCONTINUED | OUTPATIENT
Start: 2022-09-20 | End: 2022-10-04

## 2022-08-30 RX ORDER — SODIUM CHLORIDE 9 MG/ML
1000 INJECTION, SOLUTION INTRAVENOUS
Refills: 0 | Status: DISCONTINUED | OUTPATIENT
Start: 2022-09-20 | End: 2022-10-04

## 2022-08-30 NOTE — H&P PST ADULT - OTHER CARE PROVIDERS
Cardiologist--Dr. Arnold Ward, [facility], [phone]//Neurologist--Dr. Elvis Deras, [facility], [phone]//Pain Management Specialist--Dr. Matt Randle [phone]

## 2022-08-30 NOTE — H&P PST ADULT - NSANTHOSAYNRD_GEN_A_CORE
sleep study approx 1 year ago at [facility] Study, patient states negative study/No. TONY screening performed.  STOP BANG Legend: 0-2 = LOW Risk; 3-4 = INTERMEDIATE Risk; 5-8 = HIGH Risk

## 2022-08-30 NOTE — H&P PST ADULT - NSICDXPASTSURGICALHX_GEN_ALL_CORE_FT
PAST SURGICAL HISTORY:  H/O eye surgery 1/2017    History of cardioversion 3/2021 and HENRRY    History of cystoscopy -TURBT - multiple procedures ( last procedure 2/2016)  Cystoscopy and bladder Biopsy -  01/2021  cystoscopy 9/2021, January 18th, 2022, 5/17/22    History of hernia repair     Status post epidural steroid injection july/2021

## 2022-08-30 NOTE — H&P PST ADULT - PROBLEM SELECTOR PLAN 1
Cystoscopy, bladder biopsy  Labs- CBC, BMP, Hb A1C, Urine C&S  Pre-op instructions discussed  Pre op cardiology eval on 9/7/22

## 2022-08-30 NOTE — H&P PST ADULT - FALL HARM RISK - UNIVERSAL INTERVENTIONS
Bed in lowest position, wheels locked, appropriate side rails in place/Call bell, personal items and telephone in reach/Instruct patient to call for assistance before getting out of bed or chair/Non-slip footwear when patient is out of bed/[place] to call system/Purposeful Proactive Rounding/Room/bathroom lighting operational, light cord in reach

## 2022-08-30 NOTE — H&P PST ADULT - VISION (WITH CORRECTIVE LENSES IF THE PATIENT USUALLY WEARS THEM):
right eye blindness/Partially impaired: cannot see medication labels or newsprint, but can see obstacles in path, and the surrounding layout; can count fingers at arm's length Left eye prosthesis/Partially impaired: cannot see medication labels or newsprint, but can see obstacles in path, and the surrounding layout; can count fingers at arm's length

## 2022-08-30 NOTE — H&P PST ADULT - NSICDXPASTMEDICALHX_GEN_ALL_CORE_FT
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 follow up after an year    Chronic back pain -on oxycodone prn and tylenol    Colon cancer     Diabetes mellitus, type 2     Epistaxis hospitalized 3/2021    H/O sciatica     Hepatitis C Pt was treated 7 years ago    History of peripheral neuropathy     History of sciatica leg pains    LPRD (laryngopharyngeal reflux disease)     Lumbar disc disease     Lumbar radiculopathy     Malignant neoplasm of colon, unspecified part of colon s/p microsurgery at [facility]  no chemo/XRT in remission since 2012     PAST MEDICAL HISTORY:  Afib -on Eliquis/ s/p HENRRY/ cardioversion on 3/2021, Ablation 10/2021    Aortic aneurysm TAA 4.2cm--- last CTchest May 2022,    Bladder tumor s/p chemo    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 follow up after an year    Chronic back pain -on oxycodone prn and tylenol    Colon cancer     Diabetes mellitus, type 2     Epistaxis hospitalized 3/2021    H/O sciatica     Hepatitis C Pt was treated 7 years ago    History of peripheral neuropathy     History of sciatica leg pains    LPRD (laryngopharyngeal reflux disease)     Lumbar disc disease     Lumbar radiculopathy     Malignant neoplasm of colon, unspecified part of colon s/p microsurgery at [facility]  no chemo/XRT in remission since 2012

## 2022-08-30 NOTE — H&P PST ADULT - MEDICATION HERBAL REMEDIES, PROFILE
Called and left a message on the patients voicemail asking to give our office a call back.   Per Gema saunders for letter, need to discuss with pt if her landlord requires any paperwork that needs to filled out.    no

## 2022-08-30 NOTE — H&P PST ADULT - HISTORY OF PRESENT ILLNESS
76 year old male with PMH of Aortic aneurysm, 4.2cm unchanged, (last Chest CT 04/2021), A-fib on Eliquis ( LD 9/16/22), colon and bladder cancer for f/u cystoscopy, bladder biopsy on 9/20/22    , s/p HENRRY/ DCCV on 03/2021 and Ablation 10/2021, (stress test  05/2021- normal study), Colon and Bladder Cancer s/p chemo years ago in remission -- last cystoscopy January 2022. He presents to PST  evaluation prior to follow-up Cystoscopy, Bladder biopsy on 5/17/2022.    **Pt denies chest pain, palpitations, dizziness, syncope, fever, chills, dyspnea, gross hematuria, dysuria.    preop covid screen 9/17/22 76 year old male with PMH of Thoracic Aortic aneurysm, 4.2cm unchanged, (last Chest CT 05/2022), A-fib on Eliquis ( LD 9/16/22), s/p HENRRY/ DCCV on 03/2021 and Ablation 10/2021, (stress test  05/2021- normal study),  colon and bladder cancer for f/u cystoscopy, bladder biopsy on 9/20/22.    ,**Pt denies chest pain, palpitations, dizziness, syncope, fever, chills, dyspnea, gross hematuria, dysuria.    preop covid screen on  9/17/22

## 2022-08-31 LAB
CULTURE RESULTS: SIGNIFICANT CHANGE UP
SPECIMEN SOURCE: SIGNIFICANT CHANGE UP

## 2022-09-07 ENCOUNTER — APPOINTMENT (OUTPATIENT)
Dept: CARDIOLOGY | Facility: CLINIC | Age: 77
End: 2022-09-07

## 2022-09-07 VITALS
HEART RATE: 68 BPM | DIASTOLIC BLOOD PRESSURE: 80 MMHG | HEIGHT: 68 IN | OXYGEN SATURATION: 98 % | SYSTOLIC BLOOD PRESSURE: 120 MMHG | BODY MASS INDEX: 27.43 KG/M2 | WEIGHT: 181 LBS | TEMPERATURE: 97.6 F

## 2022-09-07 DIAGNOSIS — Z23 ENCOUNTER FOR IMMUNIZATION: ICD-10-CM

## 2022-09-07 PROCEDURE — 90662 IIV NO PRSV INCREASED AG IM: CPT

## 2022-09-07 PROCEDURE — 93000 ELECTROCARDIOGRAM COMPLETE: CPT

## 2022-09-07 PROCEDURE — 99214 OFFICE O/P EST MOD 30 MIN: CPT

## 2022-09-07 PROCEDURE — G0008: CPT

## 2022-09-07 NOTE — HISTORY OF PRESENT ILLNESS
[FreeTextEntry1] : \par I was asked to see this delightful patient today for Pre-op Evaluation prior to cystoscopy bladder biopsy and fulguration with Dr. Caicedo at fax number [phone] . The patient denies fever, cough, wheezing, sputum production, hemoptysis, dyspnea, congestion, diarrhea, constipation, nausea, vomiting, bright red blood per rectum, melena, angina, chest pain, palpitations, diaphoresis, PND, incontinence, frequency, urgency, dysuria, edema, joint pain, headache, weakness, numbness and dizziness. The date of the planned procedure is: 9-\par  \par \par The patient here for evaluation of high blood pressure. Patient is currently tolerating the current antihypertensive regime and they deny headaches, stiff neck, visual changes, or PND. The patient has been trying to stay on a low-sodium diet.The patient is here for follow-up of elevated cholesterol. Patient is currently tolerating medication and denies muscle pain, joint pain, back pain,  urinary changes , nausea, vomiting, abdominal pain or diarrhea. The patient is trying to follow a low cholesterol diet.\par \par The patient is also here for follow-up of atrial fibrillation and currently they feel well with an occasional episode of palpitations. The patient states they are reliably taking the anticoagulation medicine and are not having any signs of bleeding they deny any dizziness headaches nosebleeds or black tarry stools \par \par Pt is s/p HENRRY/DCCV March 2021, had ablation in October. Pt is followed by Dr. Morgan\par \par Pt w/ hx of bladder CA, scheduled for cystoscopy 1/18/2022\par \par He is following with pain management Dr. Randle for sciatica pain and is undergoing epidural on monday. \par \par

## 2022-09-08 PROBLEM — I71.9 AORTIC ANEURYSM OF UNSPECIFIED SITE, WITHOUT RUPTURE: Chronic | Status: ACTIVE | Noted: 2021-02-26

## 2022-09-08 PROBLEM — Z86.69 PERSONAL HISTORY OF OTHER DISEASES OF THE NERVOUS SYSTEM AND SENSE ORGANS: Chronic | Status: ACTIVE | Noted: 2022-08-30

## 2022-09-08 PROBLEM — K21.9 GASTRO-ESOPHAGEAL REFLUX DISEASE WITHOUT ESOPHAGITIS: Chronic | Status: ACTIVE | Noted: 2022-08-30

## 2022-09-08 PROBLEM — E11.9 TYPE 2 DIABETES MELLITUS WITHOUT COMPLICATIONS: Chronic | Status: ACTIVE | Noted: 2022-08-30

## 2022-09-08 NOTE — PACU DISCHARGE NOTE - NAUSEA/VOMITING:
Chart reviewed day 4. Patient had a cath on 9/7/22, and is scheduled for an amp on 9/9/22, will continue to follow for needs, and PT/OT req's.  Ref made 9/7 for [id number] [id number], RN None

## 2022-09-17 ENCOUNTER — OUTPATIENT (OUTPATIENT)
Dept: OUTPATIENT SERVICES | Facility: HOSPITAL | Age: 77
LOS: 1 days | End: 2022-09-17
Payer: MEDICARE

## 2022-09-17 DIAGNOSIS — Z92.241 PERSONAL HISTORY OF SYSTEMIC STEROID THERAPY: Chronic | ICD-10-CM

## 2022-09-17 DIAGNOSIS — Z98.890 OTHER SPECIFIED POSTPROCEDURAL STATES: Chronic | ICD-10-CM

## 2022-09-17 DIAGNOSIS — Z11.52 ENCOUNTER FOR SCREENING FOR COVID-19: ICD-10-CM

## 2022-09-17 DIAGNOSIS — Z98.89 OTHER SPECIFIED POSTPROCEDURAL STATES: Chronic | ICD-10-CM

## 2022-09-17 LAB — SARS-COV-2 RNA SPEC QL NAA+PROBE: SIGNIFICANT CHANGE UP

## 2022-09-17 PROCEDURE — U0003: CPT

## 2022-09-17 PROCEDURE — C9803: CPT

## 2022-09-17 PROCEDURE — U0005: CPT

## 2022-09-19 ENCOUNTER — TRANSCRIPTION ENCOUNTER (OUTPATIENT)
Age: 77
End: 2022-09-19

## 2022-09-20 ENCOUNTER — OUTPATIENT (OUTPATIENT)
Dept: OUTPATIENT SERVICES | Facility: HOSPITAL | Age: 77
LOS: 1 days | End: 2022-09-20
Payer: MEDICARE

## 2022-09-20 ENCOUNTER — TRANSCRIPTION ENCOUNTER (OUTPATIENT)
Age: 77
End: 2022-09-20

## 2022-09-20 ENCOUNTER — APPOINTMENT (OUTPATIENT)
Dept: UROLOGY | Facility: HOSPITAL | Age: 77
End: 2022-09-20

## 2022-09-20 VITALS
RESPIRATION RATE: 16 BRPM | SYSTOLIC BLOOD PRESSURE: 101 MMHG | HEART RATE: 84 BPM | OXYGEN SATURATION: 97 % | TEMPERATURE: 98 F | HEIGHT: 68 IN | WEIGHT: 177.91 LBS | DIASTOLIC BLOOD PRESSURE: 68 MMHG

## 2022-09-20 VITALS
HEART RATE: 84 BPM | TEMPERATURE: 97 F | DIASTOLIC BLOOD PRESSURE: 66 MMHG | SYSTOLIC BLOOD PRESSURE: 108 MMHG | OXYGEN SATURATION: 97 % | RESPIRATION RATE: 16 BRPM

## 2022-09-20 DIAGNOSIS — Z98.890 OTHER SPECIFIED POSTPROCEDURAL STATES: Chronic | ICD-10-CM

## 2022-09-20 DIAGNOSIS — Z98.89 OTHER SPECIFIED POSTPROCEDURAL STATES: Chronic | ICD-10-CM

## 2022-09-20 DIAGNOSIS — C67.9 MALIGNANT NEOPLASM OF BLADDER, UNSPECIFIED: ICD-10-CM

## 2022-09-20 DIAGNOSIS — Z92.241 PERSONAL HISTORY OF SYSTEMIC STEROID THERAPY: Chronic | ICD-10-CM

## 2022-09-20 LAB — GLUCOSE BLDC GLUCOMTR-MCNC: 95 MG/DL — SIGNIFICANT CHANGE UP (ref 70–99)

## 2022-09-20 PROCEDURE — 82962 GLUCOSE BLOOD TEST: CPT

## 2022-09-20 PROCEDURE — 52214 CYSTOSCOPY AND TREATMENT: CPT

## 2022-09-20 PROCEDURE — 52224 CYSTOSCOPY AND TREATMENT: CPT

## 2022-09-20 RX ORDER — CEFAZOLIN SODIUM 1 G
2000 VIAL (EA) INJECTION ONCE
Refills: 0 | Status: COMPLETED | OUTPATIENT
Start: 2022-09-20 | End: 2022-09-20

## 2022-09-20 RX ORDER — LIDOCAINE HCL 20 MG/ML
0.2 VIAL (ML) INJECTION ONCE
Refills: 0 | Status: COMPLETED | OUTPATIENT
Start: 2022-09-20 | End: 2022-09-20

## 2022-09-20 RX ORDER — FENTANYL CITRATE 50 UG/ML
25 INJECTION INTRAVENOUS
Refills: 0 | Status: DISCONTINUED | OUTPATIENT
Start: 2022-09-20 | End: 2022-09-20

## 2022-09-20 RX ORDER — OXYCODONE HYDROCHLORIDE 5 MG/1
5 TABLET ORAL ONCE
Refills: 0 | Status: DISCONTINUED | OUTPATIENT
Start: 2022-09-20 | End: 2022-09-20

## 2022-09-20 RX ORDER — ONDANSETRON 8 MG/1
4 TABLET, FILM COATED ORAL ONCE
Refills: 0 | Status: DISCONTINUED | OUTPATIENT
Start: 2022-09-20 | End: 2022-10-04

## 2022-09-20 RX ADMIN — SODIUM CHLORIDE 3 MILLILITER(S): 9 INJECTION INTRAMUSCULAR; INTRAVENOUS; SUBCUTANEOUS at 10:19

## 2022-09-20 RX ADMIN — SODIUM CHLORIDE 100 MILLILITER(S): 9 INJECTION, SOLUTION INTRAVENOUS at 10:19

## 2022-09-20 NOTE — PRE-ANESTHESIA EVALUATION ADULT - NSANTHOSAYNRD_GEN_A_CORE
sleep study approx 1 year ago at [facility] Study, patient states negative study/No. TONY screening performed.  STOP BANG Legend: 0-2 = LOW Risk; 3-4 = INTERMEDIATE Risk; 5-8 = HIGH Risk

## 2022-09-20 NOTE — ASU DISCHARGE PLAN (ADULT/PEDIATRIC) - CARE PROVIDER_API CALL
Todd Caicedo)  Urology  [address], [place] [address]  Phone: [phone]  Fax: [phone]  Follow Up Time:

## 2022-09-20 NOTE — ASU DISCHARGE PLAN (ADULT/PEDIATRIC) - ASU DC SPECIAL INSTRUCTIONSFT
You may take over the counter pain medicine as needed for pain.    You may resume Eliquis tomorrow.    Followup with Dr. Caicedo at your regular appointment interval.

## 2022-09-20 NOTE — ASU DISCHARGE PLAN (ADULT/PEDIATRIC) - BRAND OF FIRST COVID-19 BOOSTER
Patient informed of positive Cologuard result and referred to Dr. Edwige Saunders for Colonoscopy testing. Patient affirmed understanding of plan and all questions were answered. [employer]

## 2022-09-20 NOTE — ASU PATIENT PROFILE, ADULT - NSICDXPASTMEDICALHX_GEN_ALL_CORE_FT
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 follow up after an year    Chronic back pain -on oxycodone prn and tylenol    Colon cancer     Diabetes mellitus, type 2     Epistaxis hospitalized 3/2021    H/O sciatica     Hepatitis C Pt was treated 7 years ago    History of peripheral neuropathy     History of sciatica leg pains    LPRD (laryngopharyngeal reflux disease)     Lumbar disc disease     Lumbar radiculopathy     Malignant neoplasm of colon, unspecified part of colon s/p microsurgery at [facility]  no chemo/XRT in remission since 2012

## 2022-09-20 NOTE — ASU PATIENT PROFILE, ADULT - FALL HARM RISK - UNIVERSAL INTERVENTIONS
Bed in lowest position, wheels locked, appropriate side rails in place/Call bell, personal items and telephone in reach/Instruct patient to call for assistance before getting out of bed or chair/Non-slip footwear when patient is out of bed/[place] to call system/Physically safe environment - no spills, clutter or unnecessary equipment/Purposeful Proactive Rounding/Room/bathroom lighting operational, light cord in reach

## 2022-09-21 RX ORDER — TRIAMCINOLONE ACETONIDE 1 MG/G
0.1 CREAM TOPICAL TWICE DAILY
Qty: 1 | Refills: 0 | Status: ACTIVE | COMMUNITY
Start: 2022-09-21 | End: 1900-01-01

## 2022-09-22 ENCOUNTER — RX RENEWAL (OUTPATIENT)
Age: 77
End: 2022-09-22

## 2022-09-28 ENCOUNTER — NON-APPOINTMENT (OUTPATIENT)
Age: 77
End: 2022-09-28

## 2022-09-29 ENCOUNTER — NON-APPOINTMENT (OUTPATIENT)
Age: 77
End: 2022-09-29

## 2022-10-07 ENCOUNTER — RX RENEWAL (OUTPATIENT)
Age: 77
End: 2022-10-07

## 2022-10-27 NOTE — ASU DISCHARGE PLAN (ADULT/PEDIATRIC) - DO NOT DRIVE IF TAKING PAIN MEDICATION
Pt AAOx4. C/o pain. Not requesting pain medication at this time. VSS. BP taken when standing. Tele monitoring ongoing. NSR. HD ordered since tunneled cath was placed yesterday. Contacted HD RN multiple times. HD RN unable to see pt during the night. ACCU checks ACHS. Call bell in reach. Educated to call for assist if needed. Will continue to monitor.    NULL

## 2022-10-31 ENCOUNTER — APPOINTMENT (OUTPATIENT)
Dept: CARDIOLOGY | Facility: CLINIC | Age: 77
End: 2022-10-31

## 2022-11-09 ENCOUNTER — APPOINTMENT (OUTPATIENT)
Dept: PULMONOLOGY | Facility: CLINIC | Age: 77
End: 2022-11-09

## 2022-11-09 ENCOUNTER — NON-APPOINTMENT (OUTPATIENT)
Age: 77
End: 2022-11-09

## 2022-11-09 VITALS
BODY MASS INDEX: 27.13 KG/M2 | HEIGHT: 68 IN | OXYGEN SATURATION: 97 % | TEMPERATURE: 98 F | WEIGHT: 179 LBS | DIASTOLIC BLOOD PRESSURE: 82 MMHG | SYSTOLIC BLOOD PRESSURE: 140 MMHG | RESPIRATION RATE: 17 BRPM | HEART RATE: 74 BPM

## 2022-11-09 PROCEDURE — 94729 DIFFUSING CAPACITY: CPT

## 2022-11-09 PROCEDURE — 99214 OFFICE O/P EST MOD 30 MIN: CPT | Mod: 25

## 2022-11-09 PROCEDURE — 95012 NITRIC OXIDE EXP GAS DETER: CPT

## 2022-11-09 PROCEDURE — 94010 BREATHING CAPACITY TEST: CPT

## 2022-11-09 PROCEDURE — 94727 GAS DIL/WSHOT DETER LNG VOL: CPT

## 2022-11-09 NOTE — PROCEDURE
[FreeTextEntry1] : Chest CT (5.31.2022) revealed Ascending aorta again measures up to 4.2 cm\par \par PFT reveals normal flows, with an FEV1 of  2.77L, which is 103% of predicted, with an abnormal inspiratory limb\par \par Full PFT reveals normal flows; FEV1 was 3.09L which is 114% of predicted; normal lung volumes; normal diffusion at 19.8, which is 107% of predicted; normal flow volume loop. \par \par FENO was 6; a normal value being less than 25\par Fractional exhaled nitric oxide (FENO) is regarded as a simple, noninvasive method for assessing eosinophilic airway inflammation. Produced by a variety of cells within the lung, nitric oxide (NO) concentrations are generally low in healthy individuals. However, high concentrations of NO appear to be involved in nonspecific host defense mechanisms and chronic inflammatory diseases such as asthma. The American Thoracic Society (ATS) therefore has recommended using FENO to aid in the diagnosis and monitoring of eosinophilic airway inflammation and asthma, and for identifying steroid responsive individuals whose chronic respiratory symptoms may be caused by airway inflammation.

## 2022-11-09 NOTE — ADDENDUM
[FreeTextEntry1] : Documented by LAURITA Amaro acting as a scribe for Dr. Matt Loera on 11/09/2022 .\par All medical record entries made by the Scribe were at my, Dr. Matt Loera's, direction and personally dictated by me on 11/09/2022. I have reviewed the chart and agree that the record accurately reflects my personal performance of the history, physical exam, assessment and plan. I have also personally directed, reviewed, and agree with the discharge instructions. \par

## 2022-11-09 NOTE — PHYSICAL EXAM
[No Acute Distress] : no acute distress [Normal Oropharynx] : normal oropharynx [III] : Mallampati Class: III [Normal Appearance] : normal appearance [No Neck Mass] : no neck mass [Normal Rate/Rhythm] : normal rate/rhythm [Normal S1, S2] : normal s1, s2 [No Murmurs] : no murmurs [No Resp Distress] : no resp distress [Clear to Auscultation Bilaterally] : clear to auscultation bilaterally [No Abnormalities] : no abnormalities [Benign] : benign [Normal Gait] : normal gait [No Clubbing] : no clubbing [No Cyanosis] : no cyanosis [No Edema] : no edema [FROM] : FROM [Normal Color/ Pigmentation] : normal color/ pigmentation [No Focal Deficits] : no focal deficits [Oriented x3] : oriented x3 [Normal Mood] : normal mood [Normal Affect] : normal affect [TextBox_2] : right eye semi-closed  [TextBox_68] : I:E 1:3; Clear

## 2022-11-09 NOTE — ASSESSMENT
[FreeTextEntry1] : This is a 77 year old male with PMHx of Afib, colon CA, DM, HTN, HLD, LPR, diverticulosis, lumbar rediculopathy, bladder cancer s/p bladder tumor resection in 7/2020, Hep C, LVH, TAA, dilated aortic root, sciatica, otalgia of R eye, tachybrady syndrome, active cigar smoker and now an abnormal CT chest revealing bronchiectasis who is in for follow up evaluation. - stable\par \par The patient's SOB is felt to be multifactorial:\par -poor mechanics of breathing\par -out of shape/overweight\par -Pulmonary\par    - mild asthma\par -Cardiac\par \par Problem 1: mild asthma \par -add Trelegy (100) 1 inhalation qD \par - Asthma is believed to be caused by inherited (genetic) and environmental factor, but its exact cause is unknown. asthma may be triggered by allergens, lung infections, or irritants in the air. Asthma triggers are different for each person\par - Inhaler technique reviewed as well as oral hygiene technique reviewed with patient. Avoidance of cold air, extremes of temperature, rescue inhaler should be used before exercise. Order of medication reviewed with patient. Recommended use of a cool mist humidifier in the bedroom. \par \par Problem 2: chronic mucous production ddx\par 1. allergies / sinus \par 2. asthma \par 3. LPR \par \par Problem 2: allergies / sinus \par s/p given for blood work: asthma profile, food IgE panel, eosinophil level, IgE level, Vitamin D level- wnl\par -Environmental measures for allergies were encouraged including mattress and pillow cover, air purifier, and environmental controls.\par \par \par Problem 3: .Bronchiectasis and focal RML volume loss\par - s/p strep pneumonia titers low, quantitative immunoglobulins low IgM, IgG subclasses\par -complete Prevnar 20 vaccine\par - s/p sputum cultures AFBx3 (-) 5/2023\par - Follow up CT scan in 6 months to re-assess- next 5/2023\par -correlation with smoking hx\par -can contribute to cough/mucus\par -discussed condition in detail with patient\par -offered acapella to patient- not interested in use \par - Seen on the CT of the chest or chest x-ray signifies damaged bronchial tubes focal or diffuse which can be sites of recurrent infections. These areas can be colonized by various organisms including bacteria (hemophilous influenza/Pseudomonas species etc.) as well as acid fast bacilli (myobacterial disease- inclusive of TB/KELSIE etc.). Sputum either for bacteria culture/sensitivity or AFB culture and sensitivity will need to be sent if the patient has sputum- 3 specimens on consecutive days will need to be dropped at the laboratory- if the patient can produce sputum. \par \par Problem 3A: Abnormal CT - Bronchiectasis \par - f/p CT from [place] ([facility]) - next 5/2023\par - CAT scans are the only radiological modality to identify abnormality to identify abnormalities w/in the lings with regards to nodules/masses/lymph nodes. Risks, benefits were reviewed in detail. The guidelines for abnormalities include follow up CT scans at various intervals which could range from 6 weeks to 1 year intervals. If there is a change for the worse then considerations for a biopsy will be considered if you are a candidate. Second opinion evaluation with thoracic surgeon or an interventional radiologist could be offered, \par \par Problem 4.GERD/LPR-stable\par -add Pepcid 40 mg QHS\par -Rule of 2s: avoid eating too much, eating too late, eating too spicy, eating two hours before bed.\par - Things to avoid including overeating, spicy foods, tight clothing, eating within two hours of bed, this list is not all inclusive.\par - For treatments of reflux, possible options discussed including diet control, H2 blockers, PPIs, as well as coating motility agents discussed as treatment options. Timing of meals and proximity of last meal to sleep were discussed. If symptoms persist, a formal gastrointestinal evaluation is needed.\par \par Problem 5: Cardiac \par - recommended follow up with cardiologist if needed / Dr. Jake Ward \par \par Problem 6: Poor sleep\par -related to bladder issues\par -reports last sleep test was 1 year ago and was negative for sleep apnea\par \par \par Problem 7 : Health Maintenance\par -s/p flu shot 2022\par -recommended strep pneumonia vaccines: Prevnar-20 vaccine 11/2022, follow by Pneumo vaccine 23 one year following\par -recommended early intervention for URIs\par -recommended regular osteoporosis evaluations\par -recommended early dermatological evaluations\par -recommended after the age of 50 to the age of 70, colonoscopy every 5 years\par \par f/u in 6-8 weeks\par pt is encouraged to call or fax the office with any questions or concerns.\par \par \par

## 2022-11-09 NOTE — HISTORY OF PRESENT ILLNESS
[TextBox_4] : This is a 77 year old male with PMHx of Afib, colon CA, DM, HTN, HLD, LPR, diverticulosis, lumbar rediculopathy, bladder cancer s/p bladder tumor resection in 7/2020, Hep C, LVH, dilated aortic root, sciatica, otalgia of R eye, tachybrady syndrome, active cigar smoker and now an abnormal CT chest revealing bronchiectasis who is in for follow up evaluation. \par \par -he notes feeling generally well\par -he notes yearly coughing fit (that has passed)\par -he notes bowels are regular \par \par -he denies any headaches, nausea, vomiting, fever, chills, sweats, chest pain, chest pressure, coughing, wheezing, palpitations, constipation, diarrhea, dizziness, dysphagia, heartburn, reflux, itchy eyes, itchy ears, leg swelling, leg pain, arthralgias, myalgias, or sour taste in the mouth.

## 2022-11-11 NOTE — H&P PST ADULT - RESPIRATORY AND THORAX
details… Minoxidil Pregnancy And Lactation Text: This medication has not been assigned a Pregnancy Risk Category but animal studies failed to show danger with the topical medication. It is unknown if the medication is excreted in breast milk.

## 2022-11-29 ENCOUNTER — OUTPATIENT (OUTPATIENT)
Dept: OUTPATIENT SERVICES | Facility: HOSPITAL | Age: 77
LOS: 1 days | End: 2022-11-29
Payer: MEDICARE

## 2022-11-29 VITALS
HEART RATE: 98 BPM | RESPIRATION RATE: 20 BRPM | SYSTOLIC BLOOD PRESSURE: 130 MMHG | OXYGEN SATURATION: 98 % | TEMPERATURE: 98 F | DIASTOLIC BLOOD PRESSURE: 83 MMHG | WEIGHT: 182.1 LBS | HEIGHT: 68 IN

## 2022-11-29 DIAGNOSIS — D49.4 NEOPLASM OF UNSPECIFIED BEHAVIOR OF BLADDER: ICD-10-CM

## 2022-11-29 DIAGNOSIS — Z98.890 OTHER SPECIFIED POSTPROCEDURAL STATES: Chronic | ICD-10-CM

## 2022-11-29 DIAGNOSIS — J47.9 BRONCHIECTASIS, UNCOMPLICATED: ICD-10-CM

## 2022-11-29 DIAGNOSIS — Z98.89 OTHER SPECIFIED POSTPROCEDURAL STATES: Chronic | ICD-10-CM

## 2022-11-29 DIAGNOSIS — I71.9 AORTIC ANEURYSM OF UNSPECIFIED SITE, WITHOUT RUPTURE: ICD-10-CM

## 2022-11-29 DIAGNOSIS — C67.9 MALIGNANT NEOPLASM OF BLADDER, UNSPECIFIED: ICD-10-CM

## 2022-11-29 DIAGNOSIS — I48.91 UNSPECIFIED ATRIAL FIBRILLATION: ICD-10-CM

## 2022-11-29 DIAGNOSIS — Z01.818 ENCOUNTER FOR OTHER PREPROCEDURAL EXAMINATION: ICD-10-CM

## 2022-11-29 DIAGNOSIS — Z90.49 ACQUIRED ABSENCE OF OTHER SPECIFIED PARTS OF DIGESTIVE TRACT: Chronic | ICD-10-CM

## 2022-11-29 DIAGNOSIS — Z92.241 PERSONAL HISTORY OF SYSTEMIC STEROID THERAPY: Chronic | ICD-10-CM

## 2022-11-29 LAB
ANION GAP SERPL CALC-SCNC: 12 MMOL/L — SIGNIFICANT CHANGE UP (ref 5–17)
BUN SERPL-MCNC: 14 MG/DL — SIGNIFICANT CHANGE UP (ref 7–23)
CALCIUM SERPL-MCNC: 9.2 MG/DL — SIGNIFICANT CHANGE UP (ref 8.4–10.5)
CHLORIDE SERPL-SCNC: 104 MMOL/L — SIGNIFICANT CHANGE UP (ref 96–108)
CO2 SERPL-SCNC: 24 MMOL/L — SIGNIFICANT CHANGE UP (ref 22–31)
CREAT SERPL-MCNC: 0.81 MG/DL — SIGNIFICANT CHANGE UP (ref 0.5–1.3)
EGFR: 90 ML/MIN/1.73M2 — SIGNIFICANT CHANGE UP
GLUCOSE SERPL-MCNC: 110 MG/DL — HIGH (ref 70–99)
HCT VFR BLD CALC: 44.7 % — SIGNIFICANT CHANGE UP (ref 39–50)
HGB BLD-MCNC: 14.7 G/DL — SIGNIFICANT CHANGE UP (ref 13–17)
MCHC RBC-ENTMCNC: 32.9 GM/DL — SIGNIFICANT CHANGE UP (ref 32–36)
MCHC RBC-ENTMCNC: 34 PG — SIGNIFICANT CHANGE UP (ref 27–34)
MCV RBC AUTO: 103.5 FL — HIGH (ref 80–100)
NRBC # BLD: 0 /100 WBCS — SIGNIFICANT CHANGE UP (ref 0–0)
PLATELET # BLD AUTO: 206 K/UL — SIGNIFICANT CHANGE UP (ref 150–400)
POTASSIUM SERPL-MCNC: 3.9 MMOL/L — SIGNIFICANT CHANGE UP (ref 3.5–5.3)
POTASSIUM SERPL-SCNC: 3.9 MMOL/L — SIGNIFICANT CHANGE UP (ref 3.5–5.3)
RBC # BLD: 4.32 M/UL — SIGNIFICANT CHANGE UP (ref 4.2–5.8)
RBC # FLD: 12.6 % — SIGNIFICANT CHANGE UP (ref 10.3–14.5)
SODIUM SERPL-SCNC: 140 MMOL/L — SIGNIFICANT CHANGE UP (ref 135–145)
WBC # BLD: 6.06 K/UL — SIGNIFICANT CHANGE UP (ref 3.8–10.5)
WBC # FLD AUTO: 6.06 K/UL — SIGNIFICANT CHANGE UP (ref 3.8–10.5)

## 2022-11-29 PROCEDURE — 87086 URINE CULTURE/COLONY COUNT: CPT

## 2022-11-29 PROCEDURE — 85027 COMPLETE CBC AUTOMATED: CPT

## 2022-11-29 PROCEDURE — G0463: CPT

## 2022-11-29 PROCEDURE — 80048 BASIC METABOLIC PNL TOTAL CA: CPT

## 2022-11-29 PROCEDURE — 83036 HEMOGLOBIN GLYCOSYLATED A1C: CPT

## 2022-11-29 PROCEDURE — 36415 COLL VENOUS BLD VENIPUNCTURE: CPT

## 2022-11-29 RX ORDER — FLUTICASONE FUROATE, UMECLIDINIUM BROMIDE AND VILANTEROL TRIFENATATE 200; 62.5; 25 UG/1; UG/1; UG/1
1 POWDER RESPIRATORY (INHALATION)
Qty: 0 | Refills: 0 | DISCHARGE

## 2022-11-29 RX ORDER — TOBRAMYCIN 0.3 %
1 DROPS OPHTHALMIC (EYE)
Qty: 0 | Refills: 0 | DISCHARGE

## 2022-11-29 RX ORDER — ACETAMINOPHEN 500 MG
2 TABLET ORAL
Qty: 0 | Refills: 0 | DISCHARGE

## 2022-11-29 RX ORDER — SODIUM CHLORIDE 9 MG/ML
1000 INJECTION, SOLUTION INTRAVENOUS
Refills: 0 | Status: DISCONTINUED | OUTPATIENT
Start: 2022-12-20 | End: 2023-01-03

## 2022-11-29 RX ORDER — DULOXETINE HYDROCHLORIDE 30 MG/1
2 CAPSULE, DELAYED RELEASE ORAL
Qty: 0 | Refills: 0 | DISCHARGE

## 2022-11-29 NOTE — H&P PST ADULT - HISTORY OF PRESENT ILLNESS
77 yr old male with history of Thoracic aortic aneurysm (4.2 cm ) , Atrial fibrillation - s/p ablation 2021- On Eliquis , h/o Bronchiectasis ( followed by pulm ) , Type 2 DM , Colon Ca - s/p resection , Bladder tumor - s/p fulguration ( last 8/2022) , Coming in for Cystoscopy & Fulguration on 12/20/2022.    Denies Recent travel, Exposure or Covid symptoms  Covid test- 12/17/2022

## 2022-11-29 NOTE — H&P PST ADULT - MUSCULOSKELETAL
normal/ROM intact/decreased ROM due to pain/normal gait/strength 5/5 bilateral upper extremities/strength 5/5 bilateral lower extremities/decreased strength details…

## 2022-11-29 NOTE — H&P PST ADULT - NSANTHOSAYNRD_GEN_A_CORE
sleep study approx 1 year ago at [facility] Study, patient states negative study/No. TONY screening performed.  STOP BANG Legend: 0-2 = LOW Risk; 3-4 = INTERMEDIATE Risk; 5-8 = HIGH Risk

## 2022-11-29 NOTE — H&P PST ADULT - NSICDXPASTSURGICALHX_GEN_ALL_CORE_FT
PAST SURGICAL HISTORY:  H/O eye surgery 1/2017- left eye    History of cardioversion 3/2021 and HENRRY    History of cystoscopy -TURBT - multiple procedures ( last procedure 2/2016)  Cystoscopy and bladder Biopsy -  01/2021  cystoscopy 9/2021, January 18th, 2022, 5/17/22, 8/2022    History of hernia repair as child    S/P colon resection 2012    Status post epidural steroid injection july/2021     PAST SURGICAL HISTORY:  H/O eye surgery 1/2017- left eye    History of cardioversion 3/2021 and HENRRY    History of cystoscopy -TURBT - multiple procedures ( last procedure 2/2016), Cystoscopy and bladder Biopsy -  01/2021,cystoscopy 9/2021, January 18th, 2022, 5/17/22, 8/2022    History of hernia repair as child    S/P colon resection 2012    Status post epidural steroid injection july/2021

## 2022-11-29 NOTE — H&P PST ADULT - NSICDXPASTMEDICALHX_GEN_ALL_CORE_FT
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 follow up after an year, last visit 11/2022    Chronic back pain -on oxycodone prn and tylenol    Diabetes mellitus, type 2     Epistaxis hospitalized 3/2021- resolved    Hepatitis C Pt was treated 7 years ago    History of peripheral neuropathy     History of sciatica leg pains    LPRD (laryngopharyngeal reflux disease)     Lumbar disc disease     Malignant neoplasm of colon, unspecified part of colon s/p microsurgery at [facility]  no chemo/XRT in remission since 2012

## 2022-11-29 NOTE — H&P PST ADULT - OTHER CARE PROVIDERS
Cardiologist--Dr. Arnold Ward, [facility], [phone]//Neurologist--Dr. Elvis Deras, [facility], [phone]//Pain Management Specialist--Dr. Matt Randle [phone]

## 2022-11-29 NOTE — H&P PST ADULT - FALL HARM RISK - RISK INTERVENTIONS

## 2022-11-30 LAB
A1C WITH ESTIMATED AVERAGE GLUCOSE RESULT: 5.6 % — SIGNIFICANT CHANGE UP (ref 4–5.6)
CULTURE RESULTS: SIGNIFICANT CHANGE UP
ESTIMATED AVERAGE GLUCOSE: 114 MG/DL — SIGNIFICANT CHANGE UP (ref 68–114)
SPECIMEN SOURCE: SIGNIFICANT CHANGE UP

## 2022-12-01 ENCOUNTER — NON-APPOINTMENT (OUTPATIENT)
Age: 77
End: 2022-12-01

## 2022-12-01 DIAGNOSIS — N39.0 URINARY TRACT INFECTION, SITE NOT SPECIFIED: ICD-10-CM

## 2022-12-04 LAB — BACTERIA UR CULT: NORMAL

## 2022-12-11 ENCOUNTER — NON-APPOINTMENT (OUTPATIENT)
Age: 77
End: 2022-12-11

## 2022-12-12 ENCOUNTER — APPOINTMENT (OUTPATIENT)
Dept: CARDIOLOGY | Facility: CLINIC | Age: 77
End: 2022-12-12

## 2022-12-12 VITALS
SYSTOLIC BLOOD PRESSURE: 138 MMHG | HEART RATE: 81 BPM | WEIGHT: 179 LBS | HEIGHT: 68 IN | DIASTOLIC BLOOD PRESSURE: 66 MMHG | OXYGEN SATURATION: 98 % | BODY MASS INDEX: 27.13 KG/M2

## 2022-12-12 DIAGNOSIS — H10.9 UNSPECIFIED CONJUNCTIVITIS: ICD-10-CM

## 2022-12-12 PROCEDURE — 93000 ELECTROCARDIOGRAM COMPLETE: CPT

## 2022-12-12 PROCEDURE — 99214 OFFICE O/P EST MOD 30 MIN: CPT | Mod: 25

## 2022-12-12 RX ORDER — TOBRAMYCIN 3 MG/ML
0.3 SOLUTION/ DROPS OPHTHALMIC 4 TIMES DAILY
Qty: 1 | Refills: 0 | Status: ACTIVE | COMMUNITY
Start: 2022-12-12 | End: 1900-01-01

## 2022-12-12 NOTE — HISTORY OF PRESENT ILLNESS
[FreeTextEntry1] : I was asked to see this delightful patient today for Pre-op Evaluation  prior to  Cystoscopy with    _____ . The patient denies fever, cough, wheezing, sputum production, hemoptysis, dyspnea, congestion, diarrhea, constipation, nausea, vomiting, bright red blood per rectum, melena, angina, chest pain, palpitations, diaphoresis, PND, incontinence, frequency, urgency, dysuria, edema, joint pain, headache, weakness, numbness and dizziness. The date of the planned procedure is: 12/20/2022\par \par Pt w/ hx of bladder CA\par  \par The patient here for evaluation of high blood pressure. Patient is currently tolerating the current antihypertensive regime and they deny headaches, stiff neck, visual changes, or PND. The patient has been trying to stay on a low-sodium diet.\par \par The patient is also here for follow-up of atrial fibrillation and currently they feel well with an occasional episode of palpitations.  The patient states they are reliably taking the anticoagulation medicine and are not having any signs of bleeding they deny any dizziness headaches nosebleeds or black tarry stools\par \par Pt is s/p HENRRY/DCCV March 2021, had ablation in October. Pt is followed by Dr. Morgan\par The patient is here for follow-up of elevated cholesterol. Patient is currently tolerating medication and denies muscle pain, joint pain, back pain,  urinary changes , nausea, vomiting, abdominal pain or diarrhea. The patient is trying to follow a low cholesterol diet.\par \par The patient also presents for continued care of diabetes mellitus. Patient is following the diabetic regimen. Patient is tolerating hypoglycemic agents and following the diet. Patient denies any visual changes, blood in the urine, abdominal pain, nausea, vomiting, myalgias, arthralgias, paresthesia’s and syncope. The patient denies any chest discomfort, shortness of breath or new neurologic signs or symptoms. Patient denies any polyuria, worsening nocturia, or polydipsia.

## 2022-12-13 LAB
ALBUMIN SERPL ELPH-MCNC: 3.8 G/DL
ALP BLD-CCNC: 72 U/L
ALT SERPL-CCNC: 17 U/L
ANION GAP SERPL CALC-SCNC: 12 MMOL/L
AST SERPL-CCNC: 19 U/L
BASOPHILS # BLD AUTO: 0.06 K/UL
BASOPHILS NFR BLD AUTO: 0.6 %
BILIRUB DIRECT SERPL-MCNC: 0.2 MG/DL
BILIRUB INDIRECT SERPL-MCNC: 0.5 MG/DL
BILIRUB SERPL-MCNC: 0.8 MG/DL
BUN SERPL-MCNC: 13 MG/DL
CALCIUM SERPL-MCNC: 9.6 MG/DL
CHLORIDE SERPL-SCNC: 101 MMOL/L
CHOLEST SERPL-MCNC: 134 MG/DL
CK SERPL-CCNC: 44 U/L
CO2 SERPL-SCNC: 24 MMOL/L
CREAT SERPL-MCNC: 0.95 MG/DL
EGFR: 82 ML/MIN/1.73M2
EOSINOPHIL # BLD AUTO: 0.13 K/UL
EOSINOPHIL NFR BLD AUTO: 1.4 %
ESTIMATED AVERAGE GLUCOSE: 114 MG/DL
GLUCOSE SERPL-MCNC: 112 MG/DL
HBA1C MFR BLD HPLC: 5.6 %
HCT VFR BLD CALC: 43.8 %
HDLC SERPL-MCNC: 50 MG/DL
HGB BLD-MCNC: 14.5 G/DL
IMM GRANULOCYTES NFR BLD AUTO: 0.4 %
LDLC SERPL CALC-MCNC: 69 MG/DL
LDLC SERPL DIRECT ASSAY-MCNC: 63 MG/DL
LYMPHOCYTES # BLD AUTO: 2.06 K/UL
LYMPHOCYTES NFR BLD AUTO: 22.1 %
MAN DIFF?: NORMAL
MCHC RBC-ENTMCNC: 33.1 GM/DL
MCHC RBC-ENTMCNC: 34.5 PG
MCV RBC AUTO: 104.3 FL
MONOCYTES # BLD AUTO: 1.02 K/UL
MONOCYTES NFR BLD AUTO: 10.9 %
NEUTROPHILS # BLD AUTO: 6.02 K/UL
NEUTROPHILS NFR BLD AUTO: 64.6 %
NONHDLC SERPL-MCNC: 84 MG/DL
PLATELET # BLD AUTO: 204 K/UL
POTASSIUM SERPL-SCNC: 4.7 MMOL/L
PROT SERPL-MCNC: 7.5 G/DL
RBC # BLD: 4.2 M/UL
RBC # FLD: 12.7 %
SODIUM SERPL-SCNC: 136 MMOL/L
TRIGL SERPL-MCNC: 73 MG/DL
WBC # FLD AUTO: 9.33 K/UL

## 2022-12-17 ENCOUNTER — OUTPATIENT (OUTPATIENT)
Dept: OUTPATIENT SERVICES | Facility: HOSPITAL | Age: 77
LOS: 1 days | End: 2022-12-17
Payer: MEDICARE

## 2022-12-17 DIAGNOSIS — Z92.241 PERSONAL HISTORY OF SYSTEMIC STEROID THERAPY: Chronic | ICD-10-CM

## 2022-12-17 DIAGNOSIS — Z11.52 ENCOUNTER FOR SCREENING FOR COVID-19: ICD-10-CM

## 2022-12-17 DIAGNOSIS — Z98.890 OTHER SPECIFIED POSTPROCEDURAL STATES: Chronic | ICD-10-CM

## 2022-12-17 DIAGNOSIS — Z98.89 OTHER SPECIFIED POSTPROCEDURAL STATES: Chronic | ICD-10-CM

## 2022-12-17 DIAGNOSIS — Z90.49 ACQUIRED ABSENCE OF OTHER SPECIFIED PARTS OF DIGESTIVE TRACT: Chronic | ICD-10-CM

## 2022-12-17 PROBLEM — J47.9 BRONCHIECTASIS, UNCOMPLICATED: Chronic | Status: ACTIVE | Noted: 2021-09-07

## 2022-12-17 PROBLEM — R04.0 EPISTAXIS: Chronic | Status: ACTIVE | Noted: 2021-05-11

## 2022-12-17 LAB — SARS-COV-2 RNA SPEC QL NAA+PROBE: SIGNIFICANT CHANGE UP

## 2022-12-17 PROCEDURE — U0003: CPT

## 2022-12-17 PROCEDURE — U0005: CPT

## 2022-12-17 PROCEDURE — C9803: CPT

## 2022-12-19 ENCOUNTER — TRANSCRIPTION ENCOUNTER (OUTPATIENT)
Age: 77
End: 2022-12-19

## 2022-12-20 ENCOUNTER — OUTPATIENT (OUTPATIENT)
Dept: OUTPATIENT SERVICES | Facility: HOSPITAL | Age: 77
LOS: 1 days | End: 2022-12-20
Payer: MEDICARE

## 2022-12-20 ENCOUNTER — TRANSCRIPTION ENCOUNTER (OUTPATIENT)
Age: 77
End: 2022-12-20

## 2022-12-20 ENCOUNTER — APPOINTMENT (OUTPATIENT)
Dept: UROLOGY | Facility: HOSPITAL | Age: 77
End: 2022-12-20

## 2022-12-20 VITALS
DIASTOLIC BLOOD PRESSURE: 80 MMHG | TEMPERATURE: 98 F | SYSTOLIC BLOOD PRESSURE: 145 MMHG | HEART RATE: 68 BPM | RESPIRATION RATE: 16 BRPM | HEIGHT: 68 IN | OXYGEN SATURATION: 99 % | WEIGHT: 182.1 LBS

## 2022-12-20 VITALS
HEART RATE: 72 BPM | SYSTOLIC BLOOD PRESSURE: 136 MMHG | OXYGEN SATURATION: 100 % | DIASTOLIC BLOOD PRESSURE: 76 MMHG | RESPIRATION RATE: 20 BRPM

## 2022-12-20 DIAGNOSIS — Z92.241 PERSONAL HISTORY OF SYSTEMIC STEROID THERAPY: Chronic | ICD-10-CM

## 2022-12-20 DIAGNOSIS — Z98.89 OTHER SPECIFIED POSTPROCEDURAL STATES: Chronic | ICD-10-CM

## 2022-12-20 DIAGNOSIS — Z98.890 OTHER SPECIFIED POSTPROCEDURAL STATES: Chronic | ICD-10-CM

## 2022-12-20 DIAGNOSIS — C67.9 MALIGNANT NEOPLASM OF BLADDER, UNSPECIFIED: ICD-10-CM

## 2022-12-20 DIAGNOSIS — Z90.49 ACQUIRED ABSENCE OF OTHER SPECIFIED PARTS OF DIGESTIVE TRACT: Chronic | ICD-10-CM

## 2022-12-20 LAB — GLUCOSE BLDC GLUCOMTR-MCNC: 114 MG/DL — HIGH (ref 70–99)

## 2022-12-20 PROCEDURE — 52224 CYSTOSCOPY AND TREATMENT: CPT

## 2022-12-20 PROCEDURE — 82962 GLUCOSE BLOOD TEST: CPT

## 2022-12-20 PROCEDURE — 52214 CYSTOSCOPY AND TREATMENT: CPT

## 2022-12-20 RX ORDER — LIDOCAINE HCL 20 MG/ML
0.2 VIAL (ML) INJECTION ONCE
Refills: 0 | Status: COMPLETED | OUTPATIENT
Start: 2022-12-20 | End: 2022-12-20

## 2022-12-20 RX ORDER — CEFAZOLIN SODIUM 1 G
2000 VIAL (EA) INJECTION ONCE
Refills: 0 | Status: COMPLETED | OUTPATIENT
Start: 2022-12-20 | End: 2022-12-20

## 2022-12-20 RX ORDER — LIDOCAINE 4 G/100G
1 CREAM TOPICAL
Qty: 0 | Refills: 0 | DISCHARGE

## 2022-12-20 RX ADMIN — SODIUM CHLORIDE 100 MILLILITER(S): 9 INJECTION, SOLUTION INTRAVENOUS at 10:35

## 2022-12-20 NOTE — ASU PATIENT PROFILE, ADULT - NSICDXPASTSURGICALHX_GEN_ALL_CORE_FT
PAST SURGICAL HISTORY:  H/O eye surgery 1/2017- left eye    History of cardioversion 3/2021 and HENRRY    History of cystoscopy -TURBT - multiple procedures ( last procedure 2/2016), Cystoscopy and bladder Biopsy -  01/2021,cystoscopy 9/2021, January 18th, 2022, 5/17/22, 8/2022    History of hernia repair as child    S/P colon resection 2012    Status post epidural steroid injection july/2021

## 2022-12-20 NOTE — ASU DISCHARGE PLAN (ADULT/PEDIATRIC) - CARE PROVIDER_API CALL
Todd Caicedo)  Urology  [address], [address]  [place] [address]  Phone: [phone]  Fax: [phone]  Established Patient  Follow Up Time:

## 2022-12-20 NOTE — ASU DISCHARGE PLAN (ADULT/PEDIATRIC) - NS MD DC FALL RISK RISK
For information on Fall & Injury Prevention, visit: [email or web address] OR  [email or web address] OR  https://www.cdc.gov/steadi/patient.html

## 2022-12-20 NOTE — ASU PATIENT PROFILE, ADULT - FALL HARM RISK - HARM RISK INTERVENTIONS

## 2022-12-20 NOTE — ASU PATIENT PROFILE, ADULT - NSICDXPASTMEDICALHX_GEN_ALL_CORE_FT
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 follow up after an year, last visit 11/2022    Chronic back pain -on oxycodone prn and tylenol    Diabetes mellitus, type 2     Epistaxis hospitalized 3/2021- resolved    Hepatitis C Pt was treated 7 years ago    History of peripheral neuropathy     History of sciatica leg pains    LPRD (laryngopharyngeal reflux disease)     Lumbar disc disease     Malignant neoplasm of colon, unspecified part of colon s/p microsurgery at [facility]  no chemo/XRT in remission since 2012

## 2022-12-20 NOTE — PRE-ANESTHESIA EVALUATION ADULT - NSANTHOSAYNRD_GEN_A_CORE
sleep study approx 1 year ago at [facility] Study, patient states negative study/No. TONY screening performed.  STOP BANG Legend: 0-2 = LOW Risk; 3-4 = INTERMEDIATE Risk; 5-8 = HIGH Risk

## 2023-01-04 NOTE — ASU PREOP CHECKLIST - VERIFY SURGICAL SITE/SIDE WITH PATIENT
cystoscopy, bladder tumor/done Methotrexate Counseling:  Patient counseled regarding adverse effects of methotrexate including but not limited to nausea, vomiting, abnormalities in liver function tests. Patients may develop mouth sores, rash, diarrhea, and abnormalities in blood counts. The patient understands that monitoring is required including LFT's and blood counts.  There is a rare possibility of scarring of the liver and lung problems that can occur when taking methotrexate. Persistent nausea, loss of appetite, pale stools, dark urine, cough, and shortness of breath should be reported immediately. Patient advised to discontinue methotrexate treatment at least three months before attempting to become pregnant.  I discussed the need for folate supplements while taking methotrexate.  These supplements can decrease side effects during methotrexate treatment. The patient verbalized understanding of the proper use and possible adverse effects of methotrexate.  All of the patient's questions and concerns were addressed.

## 2023-01-23 NOTE — H&P PST ADULT - NSANTHNECKRD_ENT_A_CORE
Recent Visits  Date Type Provider Dept   11/16/22 Office Visit Noemi Morris MD [facility]   04/27/22 Office Visit Noemi Morris MD [facility]   03/22/22 Office Visit Noemi Morris MD [facility]   Showing recent visits within past 540 days with a meds authorizing provider and meeting all other requirements  Future Appointments  Date Type Provider Dept   02/22/23 Appointment Noemi Morris MD [facility]   Showing future appointments within next 150 days with a meds authorizing provider and meeting all other requirements     11/16/2022      No

## 2023-01-24 NOTE — H&P PST ADULT - RESPIRATORY RATE (BREATHS/MIN)
16 Oral Minoxidil Counseling- I discussed with the patient the risks of oral minoxidil including but not limited to shortness of breath, swelling of the feet or ankles, dizziness, lightheadedness, unwanted hair growth and allergic reaction.  The patient verbalized understanding of the proper use and possible adverse effects of oral minoxidil.  All of the patient's questions and concerns were addressed.

## 2023-03-02 ENCOUNTER — APPOINTMENT (OUTPATIENT)
Dept: PULMONOLOGY | Facility: CLINIC | Age: 78
End: 2023-03-02

## 2023-03-13 ENCOUNTER — APPOINTMENT (OUTPATIENT)
Dept: CARDIOLOGY | Facility: CLINIC | Age: 78
End: 2023-03-13
Payer: MEDICARE

## 2023-03-13 ENCOUNTER — NON-APPOINTMENT (OUTPATIENT)
Age: 78
End: 2023-03-13

## 2023-03-13 VITALS
WEIGHT: 179 LBS | HEIGHT: 68 IN | OXYGEN SATURATION: 95 % | DIASTOLIC BLOOD PRESSURE: 62 MMHG | SYSTOLIC BLOOD PRESSURE: 108 MMHG | HEART RATE: 86 BPM | BODY MASS INDEX: 27.13 KG/M2

## 2023-03-13 DIAGNOSIS — I71.9 AORTIC ANEURYSM OF UNSPECIFIED SITE, W/OUT RUPTURE: ICD-10-CM

## 2023-03-13 DIAGNOSIS — Z86.010 PERSONAL HISTORY OF COLONIC POLYPS: ICD-10-CM

## 2023-03-13 PROCEDURE — 99214 OFFICE O/P EST MOD 30 MIN: CPT

## 2023-03-13 NOTE — PHYSICAL EXAM
[Well Developed] : well developed [Well Nourished] : well nourished [No Acute Distress] : no acute distress [Normal Conjunctiva] : normal conjunctiva [Normal Venous Pressure] : normal venous pressure [No Carotid Bruit] : no carotid bruit [Normal S1, S2] : normal S1, S2 [No Murmur] : no murmur [No Rub] : no rub [No Gallop] : no gallop [Good Air Entry] : good air entry [Clear Lung Fields] : clear lung fields [No Respiratory Distress] : no respiratory distress  [Soft] : abdomen soft [Non Tender] : non-tender [No Masses/organomegaly] : no masses/organomegaly [Normal Bowel Sounds] : normal bowel sounds [Normal Gait] : normal gait [No Edema] : no edema [No Clubbing] : no clubbing [No Cyanosis] : no cyanosis [No Varicosities] : no varicosities [No Rash] : no rash [No Skin Lesions] : no skin lesions [Moves all extremities] : moves all extremities [No Focal Deficits] : no focal deficits [Normal Speech] : normal speech [Alert and Oriented] : alert and oriented [Normal memory] : normal memory

## 2023-03-14 ENCOUNTER — NON-APPOINTMENT (OUTPATIENT)
Age: 78
End: 2023-03-14

## 2023-03-14 NOTE — HISTORY OF PRESENT ILLNESS
[FreeTextEntry1] : TAJ HUBBARD  is a 77 year M w/ past medical history of HTN, Afib, pre DM , thoracic aortic aneurysm (4.2cm) who presents today for routine 3 month follow up. The patient denies fever, chills, sore throat, loss of taste or smell, muscle aches weight loss, malaise, rash, recent travel, insect bites, alteration bowel habits, headaches, weakness, abdominal  pain, bloating, changes in urination, visual disturbances, shortness of breath, chest pain, dizziness, palpitations.\par \par The patient here for evaluation of high blood pressure. Patient is currently tolerating the current antihypertensive regime and they deny headaches, stiff neck, visual changes, or PND. The patient has been trying to stay on a low-sodium diet.\par \par The patient is also here for f/u of pre-diabetes’s  on prior blood test  the patient is trying to follow a low carb diet and avoid simple sugars. they deny excessive thirst or urination and any blurred vision.\par \par Pt is s/p HENRRY/DCCV March 2021, had ablation in October. Pt is followed by Dr. Morgan\par The patient is here for follow-up of elevated cholesterol. Patient is currently tolerating medication and denies muscle pain, joint pain, back pain, urinary changes , nausea, vomiting, abdominal pain or diarrhea. The patient is trying to follow a low cholesterol diet.\par \par Pt w/ hx of bladder CA and Colon CA\par \par \par

## 2023-03-17 ENCOUNTER — EMERGENCY (EMERGENCY)
Facility: HOSPITAL | Age: 78
LOS: 0 days | Discharge: HOME | End: 2023-03-17
Attending: STUDENT IN AN ORGANIZED HEALTH CARE EDUCATION/TRAINING PROGRAM
Payer: SELF-PAY

## 2023-03-17 VITALS
HEIGHT: 68 IN | TEMPERATURE: 101 F | DIASTOLIC BLOOD PRESSURE: 89 MMHG | WEIGHT: 179.02 LBS | OXYGEN SATURATION: 93 % | SYSTOLIC BLOOD PRESSURE: 157 MMHG | HEART RATE: 103 BPM | RESPIRATION RATE: 20 BRPM

## 2023-03-17 VITALS
DIASTOLIC BLOOD PRESSURE: 79 MMHG | OXYGEN SATURATION: 98 % | SYSTOLIC BLOOD PRESSURE: 143 MMHG | HEART RATE: 98 BPM | RESPIRATION RATE: 20 BRPM | TEMPERATURE: 99 F

## 2023-03-17 DIAGNOSIS — Z79.84 LONG TERM (CURRENT) USE OF ORAL HYPOGLYCEMIC DRUGS: ICD-10-CM

## 2023-03-17 DIAGNOSIS — Z98.890 OTHER SPECIFIED POSTPROCEDURAL STATES: Chronic | ICD-10-CM

## 2023-03-17 DIAGNOSIS — Z98.89 OTHER SPECIFIED POSTPROCEDURAL STATES: Chronic | ICD-10-CM

## 2023-03-17 DIAGNOSIS — Z20.822 CONTACT WITH AND (SUSPECTED) EXPOSURE TO COVID-19: ICD-10-CM

## 2023-03-17 DIAGNOSIS — Z92.241 PERSONAL HISTORY OF SYSTEMIC STEROID THERAPY: Chronic | ICD-10-CM

## 2023-03-17 DIAGNOSIS — Z90.49 ACQUIRED ABSENCE OF OTHER SPECIFIED PARTS OF DIGESTIVE TRACT: Chronic | ICD-10-CM

## 2023-03-17 DIAGNOSIS — Z79.01 LONG TERM (CURRENT) USE OF ANTICOAGULANTS: ICD-10-CM

## 2023-03-17 DIAGNOSIS — R00.0 TACHYCARDIA, UNSPECIFIED: ICD-10-CM

## 2023-03-17 DIAGNOSIS — R50.9 FEVER, UNSPECIFIED: ICD-10-CM

## 2023-03-17 DIAGNOSIS — B34.9 VIRAL INFECTION, UNSPECIFIED: ICD-10-CM

## 2023-03-17 LAB
ALBUMIN SERPL ELPH-MCNC: 2.9 G/DL — LOW (ref 3.3–5)
ALP SERPL-CCNC: 63 U/L — SIGNIFICANT CHANGE UP (ref 40–120)
ALT FLD-CCNC: 20 U/L — SIGNIFICANT CHANGE UP (ref 12–78)
ANION GAP SERPL CALC-SCNC: 7 MMOL/L — SIGNIFICANT CHANGE UP (ref 5–17)
APPEARANCE UR: CLEAR — SIGNIFICANT CHANGE UP
APTT BLD: 30.7 SEC — SIGNIFICANT CHANGE UP (ref 27.5–35.5)
AST SERPL-CCNC: 21 U/L — SIGNIFICANT CHANGE UP (ref 15–37)
BACTERIA # UR AUTO: ABNORMAL
BASOPHILS # BLD AUTO: 0.02 K/UL — SIGNIFICANT CHANGE UP (ref 0–0.2)
BASOPHILS NFR BLD AUTO: 0.3 % — SIGNIFICANT CHANGE UP (ref 0–2)
BILIRUB SERPL-MCNC: 0.6 MG/DL — SIGNIFICANT CHANGE UP (ref 0.2–1.2)
BILIRUB UR-MCNC: NEGATIVE — SIGNIFICANT CHANGE UP
BUN SERPL-MCNC: 16 MG/DL — SIGNIFICANT CHANGE UP (ref 7–23)
CALCIUM SERPL-MCNC: 8.2 MG/DL — LOW (ref 8.5–10.1)
CHLORIDE SERPL-SCNC: 103 MMOL/L — SIGNIFICANT CHANGE UP (ref 96–108)
CO2 SERPL-SCNC: 25 MMOL/L — SIGNIFICANT CHANGE UP (ref 22–31)
COLOR SPEC: YELLOW — SIGNIFICANT CHANGE UP
CREAT SERPL-MCNC: 0.84 MG/DL — SIGNIFICANT CHANGE UP (ref 0.5–1.3)
DIFF PNL FLD: NEGATIVE — SIGNIFICANT CHANGE UP
EGFR: 89 ML/MIN/1.73M2 — SIGNIFICANT CHANGE UP
EOSINOPHIL # BLD AUTO: 0.03 K/UL — SIGNIFICANT CHANGE UP (ref 0–0.5)
EOSINOPHIL NFR BLD AUTO: 0.5 % — SIGNIFICANT CHANGE UP (ref 0–6)
GLUCOSE SERPL-MCNC: 113 MG/DL — HIGH (ref 70–99)
GLUCOSE UR QL: NEGATIVE MG/DL — SIGNIFICANT CHANGE UP
HCT VFR BLD CALC: 41.4 % — SIGNIFICANT CHANGE UP (ref 39–50)
HGB BLD-MCNC: 13.9 G/DL — SIGNIFICANT CHANGE UP (ref 13–17)
HMPV RNA SPEC QL NAA+PROBE: DETECTED
IMM GRANULOCYTES NFR BLD AUTO: 0.3 % — SIGNIFICANT CHANGE UP (ref 0–0.9)
INR BLD: 1.22 RATIO — HIGH (ref 0.88–1.16)
KETONES UR-MCNC: ABNORMAL
LACTATE SERPL-SCNC: 1.4 MMOL/L — SIGNIFICANT CHANGE UP (ref 0.7–2)
LEUKOCYTE ESTERASE UR-ACNC: NEGATIVE — SIGNIFICANT CHANGE UP
LYMPHOCYTES # BLD AUTO: 0.96 K/UL — LOW (ref 1–3.3)
LYMPHOCYTES # BLD AUTO: 14.7 % — SIGNIFICANT CHANGE UP (ref 13–44)
MCHC RBC-ENTMCNC: 33 PG — SIGNIFICANT CHANGE UP (ref 27–34)
MCHC RBC-ENTMCNC: 33.6 G/DL — SIGNIFICANT CHANGE UP (ref 32–36)
MCV RBC AUTO: 98.3 FL — SIGNIFICANT CHANGE UP (ref 80–100)
MONOCYTES # BLD AUTO: 0.48 K/UL — SIGNIFICANT CHANGE UP (ref 0–0.9)
MONOCYTES NFR BLD AUTO: 7.3 % — SIGNIFICANT CHANGE UP (ref 2–14)
NEUTROPHILS # BLD AUTO: 5.04 K/UL — SIGNIFICANT CHANGE UP (ref 1.8–7.4)
NEUTROPHILS NFR BLD AUTO: 76.9 % — SIGNIFICANT CHANGE UP (ref 43–77)
NITRITE UR-MCNC: NEGATIVE — SIGNIFICANT CHANGE UP
NRBC # BLD: 0 /100 WBCS — SIGNIFICANT CHANGE UP (ref 0–0)
PH UR: 7 — SIGNIFICANT CHANGE UP (ref 5–8)
PLATELET # BLD AUTO: 159 K/UL — SIGNIFICANT CHANGE UP (ref 150–400)
POTASSIUM SERPL-MCNC: 3.6 MMOL/L — SIGNIFICANT CHANGE UP (ref 3.5–5.3)
POTASSIUM SERPL-SCNC: 3.6 MMOL/L — SIGNIFICANT CHANGE UP (ref 3.5–5.3)
PROT SERPL-MCNC: 6.8 GM/DL — SIGNIFICANT CHANGE UP (ref 6–8.3)
PROT UR-MCNC: 15 MG/DL
PROTHROM AB SERPL-ACNC: 14.7 SEC — HIGH (ref 10.5–13.4)
RAPID RVP RESULT: DETECTED
RBC # BLD: 4.21 M/UL — SIGNIFICANT CHANGE UP (ref 4.2–5.8)
RBC # FLD: 12.3 % — SIGNIFICANT CHANGE UP (ref 10.3–14.5)
RBC CASTS # UR COMP ASSIST: NEGATIVE /HPF — SIGNIFICANT CHANGE UP (ref 0–4)
SARS-COV-2 RNA SPEC QL NAA+PROBE: SIGNIFICANT CHANGE UP
SODIUM SERPL-SCNC: 135 MMOL/L — SIGNIFICANT CHANGE UP (ref 135–145)
SP GR SPEC: 1 — LOW (ref 1.01–1.02)
UROBILINOGEN FLD QL: NEGATIVE MG/DL — SIGNIFICANT CHANGE UP
WBC # BLD: 6.55 K/UL — SIGNIFICANT CHANGE UP (ref 3.8–10.5)
WBC # FLD AUTO: 6.55 K/UL — SIGNIFICANT CHANGE UP (ref 3.8–10.5)
WBC UR QL: SIGNIFICANT CHANGE UP

## 2023-03-17 PROCEDURE — 71045 X-RAY EXAM CHEST 1 VIEW: CPT | Mod: 26

## 2023-03-17 PROCEDURE — 93010 ELECTROCARDIOGRAM REPORT: CPT

## 2023-03-17 PROCEDURE — 99285 EMERGENCY DEPT VISIT HI MDM: CPT

## 2023-03-17 RX ORDER — ACETAMINOPHEN 500 MG
1000 TABLET ORAL ONCE
Refills: 0 | Status: COMPLETED | OUTPATIENT
Start: 2023-03-17 | End: 2023-03-17

## 2023-03-17 RX ORDER — SODIUM CHLORIDE 9 MG/ML
2100 INJECTION INTRAMUSCULAR; INTRAVENOUS; SUBCUTANEOUS ONCE
Refills: 0 | Status: COMPLETED | OUTPATIENT
Start: 2023-03-17 | End: 2023-03-17

## 2023-03-17 RX ADMIN — SODIUM CHLORIDE 2100 MILLILITER(S): 9 INJECTION INTRAMUSCULAR; INTRAVENOUS; SUBCUTANEOUS at 23:17

## 2023-03-17 RX ADMIN — Medication 1000 MILLIGRAM(S): at 23:16

## 2023-03-17 RX ADMIN — SODIUM CHLORIDE 2100 MILLILITER(S): 9 INJECTION INTRAMUSCULAR; INTRAVENOUS; SUBCUTANEOUS at 18:13

## 2023-03-17 RX ADMIN — Medication 400 MILLIGRAM(S): at 18:13

## 2023-03-17 NOTE — ED PROVIDER NOTE - PATIENT PORTAL LINK FT
You can access the FollowMyHealth Patient Portal offered by [facility] by registering at the following website: [email or web address]. By joining [facility]’s FollowMyHealth portal, you will also be able to view your health information using other applications (apps) compatible with our system.

## 2023-03-17 NOTE — ED PROVIDER NOTE - OBJECTIVE STATEMENT
76 y/o M with PMH with multiple comorbidities presenting to the ED with fever. Patient denies abdominal pain, unlike triage note. Patient states he began feeling unwell after leaving a doctor's appointment earlier this week. Reports his back feels "hot" occasionally. Denies chest pain, cough, N/V/D, urinary symptoms, abdominal discomfort.

## 2023-03-17 NOTE — ED PROVIDER NOTE - CLINICAL SUMMARY MEDICAL DECISION MAKING FREE TEXT BOX
78 y/o M with multiple comorbidities presenting to the ED with fever.  He is mildly tachycardic and febrile upon arrival.  Sepsis ordered initiated.  Patient relatively asymptomatic, suspect covid or other viral cause, will hold on abx pending RVP, dose fluids

## 2023-03-17 NOTE — ED ADULT NURSE NOTE - EXTENSIONS OF SELF_ADULT
Patient was seen and evaluated on dialysis.     Dialyzer: Optiflux [id number]  QB: 400 mL/min  QD: 500 mL/min  K bath: 3  Goal UF: 3L  Duration: 180 min    /60 patient complaining of nausea and dry heaves- will lower UF goal to .5   Continue full hemodialysis treatment as prescribed. None

## 2023-03-17 NOTE — ED ADULT TRIAGE NOTE - CHIEF COMPLAINT QUOTE
BIBA for abdominal pain since last Tuesday. Denies n/v/d. Hx of DM, arrythmia on Eliquis. 93% on room air. Has left eye prosthesis.

## 2023-03-17 NOTE — ED PROVIDER NOTE - PROGRESS NOTE DETAILS
Labs relatively unremarkable, no lactate or leukocytosis, patient with human metapneumovirus. Will discharge home.

## 2023-03-17 NOTE — ED ADULT NURSE NOTE - OBJECTIVE STATEMENT
Presented to ER aox3 c/o mid abdominal pain and fever since Tuesday denies nauea and vomiting  Noted with left eye prosthesis / 20 gauge angiocath inserted in to rt ac blood drawn and sent to lab.

## 2023-03-19 LAB
CULTURE RESULTS: SIGNIFICANT CHANGE UP
SPECIMEN SOURCE: SIGNIFICANT CHANGE UP

## 2023-03-20 ENCOUNTER — NON-APPOINTMENT (OUTPATIENT)
Age: 78
End: 2023-03-20

## 2023-03-20 ENCOUNTER — APPOINTMENT (OUTPATIENT)
Dept: ORTHOPEDIC SURGERY | Facility: CLINIC | Age: 78
End: 2023-03-20
Payer: MEDICARE

## 2023-03-20 VITALS
TEMPERATURE: 97.7 F | SYSTOLIC BLOOD PRESSURE: 94 MMHG | DIASTOLIC BLOOD PRESSURE: 61 MMHG | OXYGEN SATURATION: 96 % | WEIGHT: 179 LBS | BODY MASS INDEX: 27.13 KG/M2 | HEIGHT: 68 IN | HEART RATE: 73 BPM

## 2023-03-20 DIAGNOSIS — M48.07 SPINAL STENOSIS, LUMBOSACRAL REGION: ICD-10-CM

## 2023-03-20 DIAGNOSIS — M43.16 SPONDYLOLISTHESIS, LUMBAR REGION: ICD-10-CM

## 2023-03-20 PROCEDURE — 99204 OFFICE O/P NEW MOD 45 MIN: CPT

## 2023-03-20 PROCEDURE — 72100 X-RAY EXAM L-S SPINE 2/3 VWS: CPT

## 2023-03-20 NOTE — ADDENDUM
[FreeTextEntry1] : This note was written by Sky Abdalla on 03/20/2023 acting as scribe for Dr. Andrew Sharma M.D.\par \par I, Andrew Sharma MD, have read and attest that all the information, medical decision making and discharge instructions within are true and accurate.\par

## 2023-03-20 NOTE — HISTORY OF PRESENT ILLNESS
[Stable] : stable [de-identified] : 77 year old male presents for initial evaluation of bilateral feet pain x 7 years, has been worsening.\par Has pain at the plantar region of B/L feet, with associated numbness/tingling.\par Also has right lateral hip pain. \par Pain and numbness at times radiates to the right lateral knee at times. \par Denies any lower back pain.\par Prolonged standing and walking aggravates the pain.\par Takes oxycodone from pain management.\par On eliquis for Afib. \par Had tried PT about 2 years ago and felt worse. \par S/P multiple LESI over the past 10 years, most recently about 6 months ago which helps. \par No fever chills sweats nausea vomiting no bowel or bladder dysfunction, no recent weight loss or gain no night pain. This history is in addition to the intake form that I personally reviewed.

## 2023-03-20 NOTE — DISCUSSION/SUMMARY
[Medication Risks Reviewed] : Medication risks reviewed [Surgical risks reviewed] : Surgical risks reviewed [de-identified] : Lumbar disc degenerative disease.\par Multilevel stenosis. \par L4-5 spondylolisthesis.\par Discussed all options.\par L4-5 laminectomy and fusion.\par Risks of surgery include infection, dural tear, nerve root injury, reherniation, future leg pain, future back pain, retained fragment, hematoma, urinary retention, worsening leg symptoms, foot drop, anesthetic risks, blood transfusion risks, positioning pain, visceral vascular injury, deep vein thrombosis, pulmonary embolus, and death. Somatosensory evoked potentials and EMG monitoring will be used. Patient will need spinal orthosis pre and post operatively. All risks were explained not exclusive to the ones mentioned alternatives were discussed and all questions were answered the patient agrees and understands the above and is in complete agreement with the plan. \par -tobacco\par Attempted PT/ESIs. \par All options discussed including rest, medicine, home exercise, acupuncture, Chiropractic care, Physical Therapy, Pain management, and last resort surgery. All questions were answered, all alternatives discussed and the patient is in complete agreement with the treatment plan which the patient contributed to and discussed with me through the shared decision making process. Follow-up appointment as instructed. Any issues and the patient will call or come in sooner. \par He will discuss with his family. \par F/U 3 weeks. \par Getting updated MRI.

## 2023-03-20 NOTE — PHYSICAL EXAM
[Normal] : Gait: normal [Stooped] : stooped [Warren's Sign] : negative Warren's sign [Pronator Drift] : negative pronator drift [SLR] : negative straight leg raise [de-identified] : 5 out of 5 motor strength, sensation is intact and symmetrical full range of motion flexion extension and rotation, no palpatory tenderness full range of motion of hips knees shoulders and elbows (all four extremities), no atrophy, negative straight leg raise, no pathological reflexes, no swelling, normal ambulation, no apparent distress skin is intact, no palpable lymph nodes, no upper or lower extremity instability, alert and oriented x3 and normal mood. Normal finger-to nose test. Stooped forward.  [de-identified] : I reviewed, interpreted and clinically correlated the following outside imaging studies, \par 11/19/2019\par History: Back pain\par MRI-LUMBAR SPINE NON CONTRAST   ([facility]) \par MRI examination of the lumbosacral spine was performed on a 1.5 Lin high field wide bore\par magnet and multiplanar multi-sequential techniques were used. Examination is interpreted\par in conjunction with prior dated 11/11/15.\par Examination of the sagittal imaging demonstrates once again a progressive grade 1\par spondylolisthesis of L4 on L5 and a degenerative appearance of the vertebral bodies. In\par addition, there is an overall decrease in AP diameter of the spinal canal consistent with\par congenital stenosis. The conus medullaris is seen to be normal.\par The intervertebral disks demonstrate decreased T2 signal.\par At L5-S1 there is a diffuse degenerative bulging disc with mild central stenosis\par unchanged.\par At L4-5 there is a spondylolisthetic shelf with diffuse degenerative bulging disc and\par progressive degenerative productive posterior change resulting in progressive very severe\par severe central and bilateral proximal foraminal stenosis.\par At L3-4 there is a diffuse degenerative bulging disc with stable appearing moderate\par central stenosis.\par At L2-3 there is a progressive diffuse degenerative bulging disc with progressive left\par foraminal asymmetry resulting in progressive moderate to severe central and severe left\par foraminal stenosis.\par Review of the remaining levels does not demonstrate evidence of focal abnormality, disc\par herniation or stenosis.\par Impression:\par Progressive spinal malalignment with progressive multilevel stenosis

## 2023-03-21 ENCOUNTER — NON-APPOINTMENT (OUTPATIENT)
Age: 78
End: 2023-03-21

## 2023-03-23 ENCOUNTER — APPOINTMENT (OUTPATIENT)
Dept: CARDIOLOGY | Facility: CLINIC | Age: 78
End: 2023-03-23
Payer: MEDICARE

## 2023-03-23 PROCEDURE — 93306 TTE W/DOPPLER COMPLETE: CPT

## 2023-03-30 LAB
ALBUMIN MFR SERPL ELPH: 48.2 %
ALBUMIN SERPL ELPH-MCNC: 4.4 G/DL
ALBUMIN SERPL-MCNC: 4.2 G/DL
ALBUMIN/GLOB SERPL: 0.9 RATIO
ALP BLD-CCNC: 81 U/L
ALPHA1 GLOB MFR SERPL ELPH: 5.3 %
ALPHA1 GLOB SERPL ELPH-MCNC: 0.5 G/DL
ALPHA2 GLOB MFR SERPL ELPH: 15.5 %
ALPHA2 GLOB SERPL ELPH-MCNC: 1.4 G/DL
ALT SERPL-CCNC: 23 U/L
ANION GAP SERPL CALC-SCNC: 14 MMOL/L
APO B SERPL-MCNC: 64 MG/DL
AST SERPL-CCNC: 25 U/L
B-GLOBULIN MFR SERPL ELPH: 19.9 %
B-GLOBULIN SERPL ELPH-MCNC: 1.8 G/DL
BASOPHILS # BLD AUTO: 0.05 K/UL
BASOPHILS NFR BLD AUTO: 0.9 %
BILIRUB DIRECT SERPL-MCNC: 0.1 MG/DL
BILIRUB INDIRECT SERPL-MCNC: 0.4 MG/DL
BILIRUB SERPL-MCNC: 0.5 MG/DL
BUN SERPL-MCNC: 12 MG/DL
CALCIUM SERPL-MCNC: 10.2 MG/DL
CHLORIDE SERPL-SCNC: 102 MMOL/L
CHOLEST SERPL-MCNC: 151 MG/DL
CK SERPL-CCNC: 32 U/L
CO2 SERPL-SCNC: 24 MMOL/L
CREAT SERPL-MCNC: 0.79 MG/DL
DEPRECATED KAPPA LC FREE/LAMBDA SER: 1.57 RATIO
EGFR: 91 ML/MIN/1.73M2
EOSINOPHIL # BLD AUTO: 0.2 K/UL
EOSINOPHIL NFR BLD AUTO: 3.4 %
ESTIMATED AVERAGE GLUCOSE: 114 MG/DL
GAMMA GLOB FLD ELPH-MCNC: 1 G/DL
GAMMA GLOB MFR SERPL ELPH: 11.1 %
GLUCOSE SERPL-MCNC: 94 MG/DL
HBA1C MFR BLD HPLC: 5.6 %
HCT VFR BLD CALC: 48.7 %
HDLC SERPL-MCNC: 56 MG/DL
HGB BLD-MCNC: 16.2 G/DL
IMM GRANULOCYTES NFR BLD AUTO: 0.2 %
INTERPRETATION SERPL IEP-IMP: NORMAL
KAPPA LC CSF-MCNC: 2.93 MG/DL
KAPPA LC SERPL-MCNC: 4.6 MG/DL
LDLC SERPL CALC-MCNC: 82 MG/DL
LDLC SERPL DIRECT ASSAY-MCNC: 82 MG/DL
LYMPHOCYTES # BLD AUTO: 1.77 K/UL
LYMPHOCYTES NFR BLD AUTO: 30.4 %
M PROTEIN SPEC IFE-MCNC: NORMAL
MAN DIFF?: NORMAL
MCHC RBC-ENTMCNC: 33.3 GM/DL
MCHC RBC-ENTMCNC: 33.7 PG
MCV RBC AUTO: 101.2 FL
MONOCYTES # BLD AUTO: 0.82 K/UL
MONOCYTES NFR BLD AUTO: 14.1 %
NEUTROPHILS # BLD AUTO: 2.98 K/UL
NEUTROPHILS NFR BLD AUTO: 51 %
NONHDLC SERPL-MCNC: 95 MG/DL
PLATELET # BLD AUTO: 231 K/UL
POTASSIUM SERPL-SCNC: 4.6 MMOL/L
PROT SERPL-MCNC: 8.6 G/DL
PROT SERPL-MCNC: 8.8 G/DL
PROT SERPL-MCNC: 8.8 G/DL
RBC # BLD: 4.81 M/UL
RBC # FLD: 13.1 %
SODIUM SERPL-SCNC: 140 MMOL/L
TRIGL SERPL-MCNC: 63 MG/DL
WBC # FLD AUTO: 5.83 K/UL

## 2023-04-04 NOTE — BRIEF OPERATIVE NOTE - NSICDXBRIEFPOSTOP_GEN_ALL_CORE_FT
"Daily Note     Today's date: 2023  Patient name: Lazaro Ruffin  : 1970  MRN: [id number]  Referring provider: Kendy Lou MD  Dx:   Encounter Diagnosis     ICD-10-CM    1  S/P total knee arthroplasty, left  [id number]           Start Time: 1100  Stop Time: 1144  Total time in clinic (min): 44 minutes    Subjective: Patient reports she is doing pretty well  Objective: See treatment diary below    Assessment: Tolerated treatment well  Patient would benefit from continued PT to further improve strength, increase available ROM, and maximize overall function  Plan: Continue per plan of care  Precautions: hx of TKA 2023, HTN    Manuals 3/31 4/4 3/17 3/20 3/24 3/28                              Re-assessment 25'        Neuro Re-Ed 3/31 4/4 3/17 3/20 3/24 3/28   balance   Ron board 3' ea way Ron board   3' ea way  Ron board 3' ea way   Lat weight shift         bridges   2x10x3\" 2x10x3\" 3x10 3x10   clamshells   10x L only  10x L only 2x10 L YHB 2x10 ea YHB   Sidestepping at bar         Suitcase carry      15lb KB 2 laps ea            Ther Ex 3/31 4/4 3/17 3/20 3/24 3/28   Bike 10' ROM 12' ROM 10' ROM 10' ROM 10' ROM 10' ROM   PROM         Seated flexion AAROM         Quad sets         SLR  5x5 ea 3lb 5x5 2lb 5x5 2lb 5x5 3lb 5x5 3lb   STS         squats         VG   25x ea L7 L6 4' DL L6 4' DL     Step ups  LSD 6in 2x10 ea Sand dune 2x10 Sand Dune  2x10    4\" x10 step up and over       Leg ext 3x10 35lbs 2up 1dwn 3x10 25lbs 2up 1 dwn   35lb 5x5 2up 1 dwn 2x10 35lbs 2up 1dwn   Leg press 3x10 100lb DL  2x10 single leg    3x10 60lb single leg 3x10 100lb eccentric   Single leg eccentric squat  2xF with airex on chair       Calf raises         HEP/education         Ther Activity                           Gait Training                           Modalities                                " POST-OP DIAGNOSIS:  Bladder cancer 07-Jul-2020 11:45:15  Mark Swenson

## 2023-04-28 ENCOUNTER — OUTPATIENT (OUTPATIENT)
Dept: OUTPATIENT SERVICES | Facility: HOSPITAL | Age: 78
LOS: 1 days | Discharge: HOME | End: 2023-04-28

## 2023-04-28 DIAGNOSIS — Z98.890 OTHER SPECIFIED POSTPROCEDURAL STATES: Chronic | ICD-10-CM

## 2023-04-28 DIAGNOSIS — Z92.241 PERSONAL HISTORY OF SYSTEMIC STEROID THERAPY: Chronic | ICD-10-CM

## 2023-04-28 DIAGNOSIS — Z98.89 OTHER SPECIFIED POSTPROCEDURAL STATES: Chronic | ICD-10-CM

## 2023-04-28 DIAGNOSIS — Z90.49 ACQUIRED ABSENCE OF OTHER SPECIFIED PARTS OF DIGESTIVE TRACT: Chronic | ICD-10-CM

## 2023-04-28 DIAGNOSIS — R77.9 ABNORMALITY OF PLASMA PROTEIN, UNSPECIFIED: ICD-10-CM

## 2023-05-09 ENCOUNTER — NON-APPOINTMENT (OUTPATIENT)
Age: 78
End: 2023-05-09

## 2023-05-09 ENCOUNTER — RESULT REVIEW (OUTPATIENT)
Age: 78
End: 2023-05-09

## 2023-05-09 ENCOUNTER — APPOINTMENT (OUTPATIENT)
Dept: HEMATOLOGY ONCOLOGY | Facility: CLINIC | Age: 78
End: 2023-05-09
Payer: MEDICARE

## 2023-05-09 ENCOUNTER — LABORATORY RESULT (OUTPATIENT)
Age: 78
End: 2023-05-09

## 2023-05-09 VITALS
HEART RATE: 82 BPM | OXYGEN SATURATION: 99 % | TEMPERATURE: 96.9 F | WEIGHT: 175.02 LBS | RESPIRATION RATE: 16 BRPM | BODY MASS INDEX: 28.13 KG/M2 | SYSTOLIC BLOOD PRESSURE: 129 MMHG | DIASTOLIC BLOOD PRESSURE: 71 MMHG | HEIGHT: 66.3 IN

## 2023-05-09 DIAGNOSIS — R77.8 OTHER SPECIFIED ABNORMALITIES OF PLASMA PROTEINS: ICD-10-CM

## 2023-05-09 LAB
BASOPHILS # BLD AUTO: 0.03 K/UL — SIGNIFICANT CHANGE UP (ref 0–0.2)
BASOPHILS NFR BLD AUTO: 0.6 % — SIGNIFICANT CHANGE UP (ref 0–2)
EOSINOPHIL # BLD AUTO: 0.16 K/UL — SIGNIFICANT CHANGE UP (ref 0–0.5)
EOSINOPHIL NFR BLD AUTO: 3.1 % — SIGNIFICANT CHANGE UP (ref 0–6)
HCT VFR BLD CALC: 41.7 % — SIGNIFICANT CHANGE UP (ref 39–50)
HGB BLD-MCNC: 13.8 G/DL — SIGNIFICANT CHANGE UP (ref 13–17)
IMM GRANULOCYTES NFR BLD AUTO: 0.2 % — SIGNIFICANT CHANGE UP (ref 0–0.9)
LYMPHOCYTES # BLD AUTO: 1.36 K/UL — SIGNIFICANT CHANGE UP (ref 1–3.3)
LYMPHOCYTES # BLD AUTO: 26.5 % — SIGNIFICANT CHANGE UP (ref 13–44)
MCHC RBC-ENTMCNC: 33.1 G/DL — SIGNIFICANT CHANGE UP (ref 32–36)
MCHC RBC-ENTMCNC: 34.1 PG — HIGH (ref 27–34)
MCV RBC AUTO: 103 FL — HIGH (ref 80–100)
MONOCYTES # BLD AUTO: 0.64 K/UL — SIGNIFICANT CHANGE UP (ref 0–0.9)
MONOCYTES NFR BLD AUTO: 12.5 % — SIGNIFICANT CHANGE UP (ref 2–14)
NEUTROPHILS # BLD AUTO: 2.93 K/UL — SIGNIFICANT CHANGE UP (ref 1.8–7.4)
NEUTROPHILS NFR BLD AUTO: 57.1 % — SIGNIFICANT CHANGE UP (ref 43–77)
NRBC # BLD: 0 /100 WBCS — SIGNIFICANT CHANGE UP (ref 0–0)
PLATELET # BLD AUTO: 185 K/UL — SIGNIFICANT CHANGE UP (ref 150–400)
RBC # BLD: 4.05 M/UL — LOW (ref 4.2–5.8)
RBC # FLD: 13.2 % — SIGNIFICANT CHANGE UP (ref 10.3–14.5)
WBC # BLD: 5.13 K/UL — SIGNIFICANT CHANGE UP (ref 3.8–10.5)
WBC # FLD AUTO: 5.13 K/UL — SIGNIFICANT CHANGE UP (ref 3.8–10.5)

## 2023-05-09 PROCEDURE — 99204 OFFICE O/P NEW MOD 45 MIN: CPT

## 2023-05-09 RX ORDER — DULOXETINE HYDROCHLORIDE 60 MG/1
60 CAPSULE, DELAYED RELEASE PELLETS ORAL
Qty: 30 | Refills: 0 | Status: DISCONTINUED | COMMUNITY
Start: 2022-07-12 | End: 2023-05-09

## 2023-05-09 RX ORDER — DULOXETINE HYDROCHLORIDE 20 MG/1
20 CAPSULE, DELAYED RELEASE PELLETS ORAL
Qty: 30 | Refills: 0 | Status: DISCONTINUED | COMMUNITY
Start: 2022-05-24 | End: 2023-05-09

## 2023-05-09 RX ORDER — GABAPENTIN 300 MG/1
300 CAPSULE ORAL
Qty: 30 | Refills: 0 | Status: DISCONTINUED | COMMUNITY
Start: 2022-05-12 | End: 2023-05-09

## 2023-05-09 RX ORDER — DULOXETINE HYDROCHLORIDE 30 MG/1
30 CAPSULE, DELAYED RELEASE PELLETS ORAL
Qty: 30 | Refills: 0 | Status: DISCONTINUED | COMMUNITY
Start: 2022-07-13 | End: 2023-05-09

## 2023-05-09 NOTE — ASSESSMENT
[FreeTextEntry1] : 76 y/o M with extensive medical history detailed above here for evaluation of elevated total protein and elevated free light chains. \par \par Patient without any suspicious manifestations consistent with monoclonal gammopathy. Immunoelectrophoresis showed protein expansion across alpha and beta regions, and no evidence of paraproteinemia and serum immunofixation showed no evidence of monoclonal protein. Although kappa and lambda FLCs were elevated, they were only modestly so and with a normal ratio. \par \par Plan:\par -Will recheck IMEL today with urine for BJ proteins to rule out monoclonal light chain process that was lost in the urine but very low suspicion. \par -Otherwise, provided reassurance to patient in the office today. \par -RTC PRN. \par

## 2023-05-09 NOTE — PHYSICAL EXAM
[Fully active, able to carry on all pre-disease performance without restriction] : Status 0 - Fully active, able to carry on all pre-disease performance without restriction [Normal] : affect appropriate [de-identified] : s/p L eye reconstruction

## 2023-05-09 NOTE — REASON FOR VISIT
[Initial Consultation] : an initial consultation for [Family Member] : family member [FreeTextEntry2] : Elevated
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
## 2023-05-15 ENCOUNTER — APPOINTMENT (OUTPATIENT)
Dept: CARDIOLOGY | Facility: CLINIC | Age: 78
End: 2023-05-15
Payer: MEDICARE

## 2023-05-15 VITALS
SYSTOLIC BLOOD PRESSURE: 115 MMHG | TEMPERATURE: 97.6 F | OXYGEN SATURATION: 98 % | DIASTOLIC BLOOD PRESSURE: 78 MMHG | HEART RATE: 97 BPM

## 2023-05-15 DIAGNOSIS — R77.9 ABNORMALITY OF PLASMA PROTEIN, UNSPECIFIED: ICD-10-CM

## 2023-05-15 PROCEDURE — 99214 OFFICE O/P EST MOD 30 MIN: CPT

## 2023-05-19 LAB
ALBUMIN MFR SERPL ELPH: 55.6 %
ALBUMIN SERPL ELPH-MCNC: 4.1 G/DL
ALBUMIN SERPL-MCNC: 3.7 G/DL
ALBUMIN/GLOB SERPL: 1.3 RATIO
ALP BLD-CCNC: 74 U/L
ALPHA1 GLOB MFR SERPL ELPH: 4.3 %
ALPHA1 GLOB SERPL ELPH-MCNC: 0.3 G/DL
ALPHA2 GLOB MFR SERPL ELPH: 13.8 %
ALPHA2 GLOB SERPL ELPH-MCNC: 0.9 G/DL
ALT SERPL-CCNC: 10 U/L
ANION GAP SERPL CALC-SCNC: 14 MMOL/L
AST SERPL-CCNC: 15 U/L
B-GLOBULIN MFR SERPL ELPH: 13 %
B-GLOBULIN SERPL ELPH-MCNC: 0.9 G/DL
B2 MICROGLOB SERPL-MCNC: 3.3 MG/L
BILIRUB SERPL-MCNC: 0.4 MG/DL
BUN SERPL-MCNC: 17 MG/DL
CALCIUM SERPL-MCNC: 9.4 MG/DL
CHLORIDE SERPL-SCNC: 105 MMOL/L
CO2 SERPL-SCNC: 23 MMOL/L
CREAT SERPL-MCNC: 0.92 MG/DL
DEPRECATED KAPPA LC FREE/LAMBDA SER: 1.48 RATIO
EGFR: 85 ML/MIN/1.73M2
GAMMA GLOB FLD ELPH-MCNC: 0.9 G/DL
GAMMA GLOB MFR SERPL ELPH: 13.3 %
GLUCOSE SERPL-MCNC: 108 MG/DL
IGA SER QL IEP: 322 MG/DL
IGG SER QL IEP: 831 MG/DL
IGM SER QL IEP: 37 MG/DL
INTERPRETATION SERPL IEP-IMP: NORMAL
KAPPA LC 24H UR QL: NORMAL
KAPPA LC CSF-MCNC: 2.34 MG/DL
KAPPA LC SERPL-MCNC: 3.46 MG/DL
M PROTEIN SPEC IFE-MCNC: NORMAL
POTASSIUM SERPL-SCNC: 5 MMOL/L
PROT SERPL-MCNC: 6.6 G/DL
SODIUM SERPL-SCNC: 142 MMOL/L

## 2023-05-22 ENCOUNTER — APPOINTMENT (OUTPATIENT)
Dept: INTERNAL MEDICINE | Facility: CLINIC | Age: 78
End: 2023-05-22
Payer: MEDICARE

## 2023-05-22 VITALS
BODY MASS INDEX: 27.64 KG/M2 | HEIGHT: 66.3 IN | WEIGHT: 172 LBS | SYSTOLIC BLOOD PRESSURE: 120 MMHG | HEART RATE: 91 BPM | DIASTOLIC BLOOD PRESSURE: 68 MMHG | OXYGEN SATURATION: 99 %

## 2023-05-22 DIAGNOSIS — C18.9 MALIGNANT NEOPLASM OF COLON, UNSPECIFIED: ICD-10-CM

## 2023-05-22 DIAGNOSIS — I51.7 CARDIOMEGALY: ICD-10-CM

## 2023-05-22 PROCEDURE — 93000 ELECTROCARDIOGRAM COMPLETE: CPT

## 2023-05-22 PROCEDURE — 99204 OFFICE O/P NEW MOD 45 MIN: CPT | Mod: 25

## 2023-05-22 NOTE — HISTORY OF PRESENT ILLNESS
[FreeTextEntry1] : TAJ HUBBARD  is a 77 year old M w/ pmhx of, Afib, pre DM , thoracic aortic aneurysm (4.2cm), bladder CA, and colon CA, Hep C s/p tx  who presents today for routine follow up. The patient denies fever, chills, sore throat, loss of taste or smell, muscle aches weight loss, malaise, rash, recent travel, insect bites, alteration bowel habits, headaches, weakness, abdominal  pain, bloating, changes in urination, visual disturbances, shortness of breath, chest pain, dizziness, palpitations.\par \par The patient is also here for follow-up of atrial fibrillation and currently they feel well with an occasional episode of palpitations.  The patient states they are reliably taking the anticoagulation medicine and are not having any signs of bleeding they deny any dizziness headaches nosebleeds or black tarry stools\par \par The patient is also here for f/u of pre-diabetes’s  on prior blood test  the patient is trying to follow a low carb diet and avoid simple sugars. they deny excessive thirst or urination and any blurred vision.\par \par Pt noted to have high serum protein on labs and was referred to Dr. Goldberg who is following. Indicates Dr. Goldberg performed extensive w/u which revealed no extensive pathology without need for further f/u.\par \par I was asked to see this delightful patient today for Pre-op Evaluation  prior to Cystoscopy with  Dr. Caicedo.  The patient denies fever, cough, wheezing, sputum production, hemoptysis, dyspnea, congestion, diarrhea, constipation, nausea, vomiting, bright red blood per rectum, melena, angina, chest pain, palpitations, diaphoresis, PND, incontinence, frequency, urgency, dysuria, edema, joint pain, headache, weakness, numbness and dizziness. The date of the planned procedure is: 6/27/2023.

## 2023-05-22 NOTE — PHYSICAL EXAM
[No Acute Distress] : no acute distress [Normal Sclera/Conjunctiva] : normal sclera/conjunctiva [Normal Outer Ear/Nose] : the outer ears and nose were normal in appearance [No JVD] : no jugular venous distention [No Respiratory Distress] : no respiratory distress  [No Accessory Muscle Use] : no accessory muscle use [Clear to Auscultation] : lungs were clear to auscultation bilaterally [Normal Rate] : normal rate  [Regular Rhythm] : with a regular rhythm [Normal S1, S2] : normal S1 and S2 [No Edema] : there was no peripheral edema [Soft] : abdomen soft [No Rash] : no rash

## 2023-05-24 NOTE — HEALTH RISK ASSESSMENT
[Good] : ~his/her~  mood as  good [No] : No [One fall no injury in past year] : Patient reported one fall in the past year without injury [0] : 2) Feeling down, depressed, or hopeless: Not at all (0) [PHQ-2 Negative - No further assessment needed] : PHQ-2 Negative - No further assessment needed [Current] : Current [de-identified] : Cigar smoker, uses them occasionally

## 2023-05-24 NOTE — REVIEW OF SYSTEMS
[Fever] : no fever [Chills] : no chills [Discharge] : no discharge [Pain] : no pain [Earache] : no earache [Hearing Loss] : no hearing loss [Chest Pain] : no chest pain [Palpitations] : no palpitations [Shortness Of Breath] : no shortness of breath [Wheezing] : no wheezing [Abdominal Pain] : no abdominal pain [Vomiting] : no vomiting [Dysuria] : no dysuria [Hematuria] : no hematuria [Joint Pain] : no joint pain [Back Pain] : no back pain [Itching] : no itching [Headache] : no headache [Memory Loss] : no memory loss [Suicidal] : not suicidal [Easy Bleeding] : no easy bleeding

## 2023-05-24 NOTE — HISTORY OF PRESENT ILLNESS
[FreeTextEntry1] : Establish care [de-identified] : 77 year old male with PMHx of Colon CA, bladder CA, HTN, HLD, pre-DM, Afib, thoracic aortic aneurysm who presents to establish care. Currently he has no acute medical complaints.

## 2023-05-24 NOTE — PLAN
[FreeTextEntry1] : H/o Colon/bladder cancer - f/u with oncology\par HTN- diet controlled\par HLD- not on statin, advised diet/ls modifications\par DM- c/w metformin, advised diet/ls modifications\par Afib - on eliquis, f/u cardiology\par Thoracic aortic aneurysm - f/u with cardiology, awaiting CT chest\par Tobacco user - uses cigars, advised cessation\par Bronchietasis/asthma - f/u with pulmonary

## 2023-06-06 ENCOUNTER — OUTPATIENT (OUTPATIENT)
Dept: OUTPATIENT SERVICES | Facility: HOSPITAL | Age: 78
LOS: 1 days | End: 2023-06-06
Payer: MEDICARE

## 2023-06-06 VITALS
HEART RATE: 91 BPM | DIASTOLIC BLOOD PRESSURE: 69 MMHG | HEIGHT: 68 IN | TEMPERATURE: 98 F | RESPIRATION RATE: 20 BRPM | WEIGHT: 169.98 LBS | OXYGEN SATURATION: 98 % | SYSTOLIC BLOOD PRESSURE: 106 MMHG

## 2023-06-06 DIAGNOSIS — Z92.241 PERSONAL HISTORY OF SYSTEMIC STEROID THERAPY: Chronic | ICD-10-CM

## 2023-06-06 DIAGNOSIS — C67.9 MALIGNANT NEOPLASM OF BLADDER, UNSPECIFIED: ICD-10-CM

## 2023-06-06 DIAGNOSIS — Z90.49 ACQUIRED ABSENCE OF OTHER SPECIFIED PARTS OF DIGESTIVE TRACT: Chronic | ICD-10-CM

## 2023-06-06 DIAGNOSIS — Z98.890 OTHER SPECIFIED POSTPROCEDURAL STATES: Chronic | ICD-10-CM

## 2023-06-06 DIAGNOSIS — Z98.89 OTHER SPECIFIED POSTPROCEDURAL STATES: Chronic | ICD-10-CM

## 2023-06-06 DIAGNOSIS — D49.4 NEOPLASM OF UNSPECIFIED BEHAVIOR OF BLADDER: ICD-10-CM

## 2023-06-06 DIAGNOSIS — E11.9 TYPE 2 DIABETES MELLITUS WITHOUT COMPLICATIONS: ICD-10-CM

## 2023-06-06 DIAGNOSIS — Z01.818 ENCOUNTER FOR OTHER PREPROCEDURAL EXAMINATION: ICD-10-CM

## 2023-06-06 DIAGNOSIS — I48.20 CHRONIC ATRIAL FIBRILLATION, UNSPECIFIED: ICD-10-CM

## 2023-06-06 LAB
A1C WITH ESTIMATED AVERAGE GLUCOSE RESULT: 5.6 % — SIGNIFICANT CHANGE UP (ref 4–5.6)
ALBUMIN SERPL ELPH-MCNC: 4.1 G/DL — SIGNIFICANT CHANGE UP (ref 3.3–5)
ALP SERPL-CCNC: 58 U/L — SIGNIFICANT CHANGE UP (ref 40–120)
ALT FLD-CCNC: 10 U/L — SIGNIFICANT CHANGE UP (ref 10–45)
ANION GAP SERPL CALC-SCNC: 14 MMOL/L — SIGNIFICANT CHANGE UP (ref 5–17)
AST SERPL-CCNC: 13 U/L — SIGNIFICANT CHANGE UP (ref 10–40)
BILIRUB SERPL-MCNC: 0.7 MG/DL — SIGNIFICANT CHANGE UP (ref 0.2–1.2)
BUN SERPL-MCNC: 13 MG/DL — SIGNIFICANT CHANGE UP (ref 7–23)
CALCIUM SERPL-MCNC: 9.5 MG/DL — SIGNIFICANT CHANGE UP (ref 8.4–10.5)
CHLORIDE SERPL-SCNC: 106 MMOL/L — SIGNIFICANT CHANGE UP (ref 96–108)
CO2 SERPL-SCNC: 19 MMOL/L — LOW (ref 22–31)
CREAT SERPL-MCNC: 0.9 MG/DL — SIGNIFICANT CHANGE UP (ref 0.5–1.3)
EGFR: 87 ML/MIN/1.73M2 — SIGNIFICANT CHANGE UP
ESTIMATED AVERAGE GLUCOSE: 114 MG/DL — SIGNIFICANT CHANGE UP (ref 68–114)
GLUCOSE SERPL-MCNC: 109 MG/DL — HIGH (ref 70–99)
HCT VFR BLD CALC: 43.3 % — SIGNIFICANT CHANGE UP (ref 39–50)
HGB BLD-MCNC: 14.4 G/DL — SIGNIFICANT CHANGE UP (ref 13–17)
MCHC RBC-ENTMCNC: 33.3 GM/DL — SIGNIFICANT CHANGE UP (ref 32–36)
MCHC RBC-ENTMCNC: 34 PG — SIGNIFICANT CHANGE UP (ref 27–34)
MCV RBC AUTO: 102.1 FL — HIGH (ref 80–100)
NRBC # BLD: 0 /100 WBCS — SIGNIFICANT CHANGE UP (ref 0–0)
PLATELET # BLD AUTO: 196 K/UL — SIGNIFICANT CHANGE UP (ref 150–400)
POTASSIUM SERPL-MCNC: 4.1 MMOL/L — SIGNIFICANT CHANGE UP (ref 3.5–5.3)
POTASSIUM SERPL-SCNC: 4.1 MMOL/L — SIGNIFICANT CHANGE UP (ref 3.5–5.3)
PROT SERPL-MCNC: 6.9 G/DL — SIGNIFICANT CHANGE UP (ref 6–8.3)
RBC # BLD: 4.24 M/UL — SIGNIFICANT CHANGE UP (ref 4.2–5.8)
RBC # FLD: 13.3 % — SIGNIFICANT CHANGE UP (ref 10.3–14.5)
SODIUM SERPL-SCNC: 139 MMOL/L — SIGNIFICANT CHANGE UP (ref 135–145)
WBC # BLD: 6.07 K/UL — SIGNIFICANT CHANGE UP (ref 3.8–10.5)
WBC # FLD AUTO: 6.07 K/UL — SIGNIFICANT CHANGE UP (ref 3.8–10.5)

## 2023-06-06 PROCEDURE — 80053 COMPREHEN METABOLIC PANEL: CPT

## 2023-06-06 PROCEDURE — 36415 COLL VENOUS BLD VENIPUNCTURE: CPT

## 2023-06-06 PROCEDURE — 83036 HEMOGLOBIN GLYCOSYLATED A1C: CPT

## 2023-06-06 PROCEDURE — 87086 URINE CULTURE/COLONY COUNT: CPT

## 2023-06-06 PROCEDURE — 85027 COMPLETE CBC AUTOMATED: CPT

## 2023-06-06 PROCEDURE — G0463: CPT

## 2023-06-06 RX ORDER — TOBRAMYCIN 0.3 %
1 DROPS OPHTHALMIC (EYE)
Qty: 0 | Refills: 0 | DISCHARGE

## 2023-06-06 RX ORDER — DULOXETINE HYDROCHLORIDE 30 MG/1
1 CAPSULE, DELAYED RELEASE ORAL
Qty: 0 | Refills: 0 | DISCHARGE

## 2023-06-06 RX ORDER — SODIUM CHLORIDE 9 MG/ML
1000 INJECTION, SOLUTION INTRAVENOUS
Refills: 0 | Status: DISCONTINUED | OUTPATIENT
Start: 2023-06-27 | End: 2023-07-11

## 2023-06-06 NOTE — H&P PST ADULT - ASSESSMENT
[facility] ACTIVITIES- WORKS AS AN , WALKING  [facility] SCORE- 5.04  MALLAMPATI- 2  PATIENT WITH TOP AND BOTTOM DENTURE

## 2023-06-06 NOTE — H&P PST ADULT - PROBLEM SELECTOR PLAN 3
fs on admit  patient advised to hold metformin x24 hours prior to surgery, last dose 6/26/2023 morning

## 2023-06-06 NOTE — H&P PST ADULT - NSICDXPASTMEDICALHX_GEN_ALL_CORE_FT
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 follow up after an year, last visit 11/2022    Chronic back pain -on oxycodone prn and tylenol    Diabetes mellitus, type 2     Epistaxis hospitalized 3/2021- resolved    Hepatitis C Pt was treated 7 years ago    History of peripheral neuropathy     History of sciatica leg pains    LPRD (laryngopharyngeal reflux disease)     Lumbar disc disease     Malignant neoplasm of colon, unspecified part of colon s/p microsurgery at [facility]  no chemo/XRT in remission since 2012

## 2023-06-06 NOTE — H&P PST ADULT - PROBLEM SELECTOR PLAN 2
patient advised by his cardiologist to hold Eliquis x 3 days prior to surgery, last dose of Eliquis on 6/24/2023 morning   patient is scheduled to see his cardiologist on 6/7/2023, report to obtain

## 2023-06-06 NOTE — H&P PST ADULT - HISTORY OF PRESENT ILLNESS
neuropathy  2012  fell off the ladder -bottom of feet  77 year old male with h/o bladder tumor s/p resection and intravesicular gemcitabine in 2020, presents for preop testing for scheduled every six months repeat cystoscopy. He denies any hematuria, no dysuria, no frequency/ urgency. His medical history significant for chronic Afib on Eliquis, unsuccessful cardiac ablation for Afib, thoracic aortic aneurysm (4.2 cm), colon resection for malignancy in 2020, bronchiectasis, dyslipidemia, T2DM, hepatitis-C (treated in the past), peripheral neuropathy, back pain, left eye blindness (reconstruction 2020), [place], foot pain (fell off ladder many years ago), GERD.   77 year old male with h/o bladder tumor s/p resection and intravesicular gemcitabine in 2020, presents for preop testing for scheduled routine every six months follow up cystoscopy. He denies any hematuria, no dysuria, no frequency/ urgency. His medical history significant for chronic Afib on Eliquis, unsuccessful cardiac ablation for Afib, thoracic aortic aneurysm (4.2 cm), colon resection for malignancy in 2020, bronchiectasis, dyslipidemia, T2DM, hepatitis-C (treated in the past), peripheral neuropathy, back pain, left eye blindness (reconstruction 2020), [place], foot pain (fell off ladder many years ago), GERD.

## 2023-06-06 NOTE — H&P PST ADULT - PROBLEM SELECTOR PLAN 1
Plan- cystoscopy and fulguration   preop instruction provided in writing and verbally, verbalized understanding and teach back   urine culture result pending

## 2023-06-07 ENCOUNTER — NON-APPOINTMENT (OUTPATIENT)
Age: 78
End: 2023-06-07

## 2023-06-07 ENCOUNTER — APPOINTMENT (OUTPATIENT)
Dept: CARDIOLOGY | Facility: CLINIC | Age: 78
End: 2023-06-07
Payer: MEDICARE

## 2023-06-07 VITALS
OXYGEN SATURATION: 98 % | HEIGHT: 66 IN | DIASTOLIC BLOOD PRESSURE: 64 MMHG | SYSTOLIC BLOOD PRESSURE: 112 MMHG | HEART RATE: 68 BPM

## 2023-06-07 DIAGNOSIS — Z79.01 LONG TERM (CURRENT) USE OF ANTICOAGULANTS: ICD-10-CM

## 2023-06-07 DIAGNOSIS — Z01.810 ENCOUNTER FOR PREPROCEDURAL CARDIOVASCULAR EXAMINATION: ICD-10-CM

## 2023-06-07 LAB
CULTURE RESULTS: SIGNIFICANT CHANGE UP
SPECIMEN SOURCE: SIGNIFICANT CHANGE UP

## 2023-06-07 PROCEDURE — 93000 ELECTROCARDIOGRAM COMPLETE: CPT

## 2023-06-07 PROCEDURE — 99214 OFFICE O/P EST MOD 30 MIN: CPT | Mod: 25

## 2023-06-07 NOTE — HISTORY OF PRESENT ILLNESS
[FreeTextEntry1] : I was asked to see this delightful patient today for Pre-op Evaluation  prior to  _cystoscopy____  with Dr. Parmar  .  The patient denies fever, cough, wheezing, sputum production, hemoptysis, dyspnea, congestion, diarrhea, constipation, nausea, vomiting, bright red blood per rectum, melena, angina, chest pain, palpitations, diaphoresis, PND, incontinence, frequency, urgency, dysuria, edema, joint pain, headache, weakness, numbness and dizziness. The date of the planned procedure is: ___6/27/2023________\par \par TAJ HUBBARD is a 77 year old M w/ pmhx of, Afib, pre DM , thoracic aortic aneurysm (4.2cm), bladder CA, and colon CA, Hep C s/p tx who presents today for routine follow up. The patient denies fever, chills, sore throat, loss of taste or smell, muscle aches weight loss, malaise, rash, recent travel, insect bites, alteration bowel habits, headaches, weakness, abdominal pain, bloating, changes in urination, visual disturbances, shortness of breath, chest pain, dizziness, palpitations.\par \par \par The patient also presents for continued care of diabetes mellitus. Patient is following the diabetic regimen. Patient is tolerating hypoglycemic agents and following the diet. Patient denies any visual changes, blood in the urine, abdominal pain, nausea, vomiting, myalgias, arthralgias, paresthesia’s and syncope. The patient denies any chest discomfort, shortness of breath or new neurologic signs or symptoms. Patient denies any polyuria, worsening nocturia, or polydipsia. \par \par The patient here for evaluation of high blood pressure. Patient is currently tolerating the current antihypertensive regime and they deny headaches, stiff neck, visual changes, or PND. The patient has been trying to stay on a low-sodium diet.\par \par \par The patient is also here for follow-up of atrial fibrillation and currently they feel well with an occasional episode of palpitations.  The patient states they are reliably taking the anticoagulation medicine and are not having any signs of bleeding they deny any dizziness headaches nosebleeds or black tarry stools\par \par \par Pt is s/p HENRRY/DCCV March 2021, had ablation in October. Pt is followed by Dr. Morgan\par The patient is here for follow-up of elevated cholesterol. Patient is currently tolerating medication and denies muscle pain, joint pain, back pain, urinary changes , nausea, vomiting, abdominal pain or diarrhea. The patient is trying to follow a low cholesterol diet.\par \par The patient also presents for continued care of diabetes mellitus. Patient is following the diabetic regimen. Patient is tolerating hypoglycemic agents and following the diet. Patient denies any visual changes, blood in the urine, abdominal pain, nausea, vomiting, myalgias, arthralgias, paresthesia’s and syncope. The patient denies any chest discomfort, shortness of breath or new neurologic signs or symptoms. Patient denies any polyuria, worsening nocturia, or polydipsia. \par

## 2023-06-19 ENCOUNTER — NON-APPOINTMENT (OUTPATIENT)
Age: 78
End: 2023-06-19

## 2023-06-19 ENCOUNTER — APPOINTMENT (OUTPATIENT)
Dept: PULMONOLOGY | Facility: CLINIC | Age: 78
End: 2023-06-19
Payer: MEDICARE

## 2023-06-19 VITALS
SYSTOLIC BLOOD PRESSURE: 110 MMHG | BODY MASS INDEX: 27.64 KG/M2 | OXYGEN SATURATION: 97 % | WEIGHT: 172 LBS | TEMPERATURE: 97.2 F | HEIGHT: 66 IN | HEART RATE: 52 BPM | RESPIRATION RATE: 16 BRPM | DIASTOLIC BLOOD PRESSURE: 70 MMHG

## 2023-06-19 DIAGNOSIS — R05.3 CHRONIC COUGH: ICD-10-CM

## 2023-06-19 PROCEDURE — 99214 OFFICE O/P EST MOD 30 MIN: CPT | Mod: 25

## 2023-06-19 PROCEDURE — 94010 BREATHING CAPACITY TEST: CPT

## 2023-06-19 PROCEDURE — 95012 NITRIC OXIDE EXP GAS DETER: CPT

## 2023-06-19 PROCEDURE — 94618 PULMONARY STRESS TESTING: CPT

## 2023-06-19 NOTE — ADDENDUM
[FreeTextEntry1] : Documented by Kraig Trimble acting as a scribe for Dr. Matt Loera on 06/19/2023.\par \par All medical record entries made by the Scribe were at my, Dr. Matt Loera's, direction and personally dictated by me on 06/19/2023. I have reviewed the chart and agree that the record accurately reflects my personal performance of the history, physical exam, assessment and plan. I have also personally directed, reviewed, and agree with the discharge instructions.

## 2023-06-19 NOTE — ASSESSMENT
[FreeTextEntry1] : This is a 77 year old male with PMHx of Afib, colon CA, DM, HTN, HLD, LPR, diverticulosis, lumbar rediculopathy, bladder cancer s/p bladder tumor resection in 7/2020, Hep C, LVH, TAA, dilated aortic root, sciatica, otalgia of R eye, tachybrady syndrome, active cigar smoker and now an abnormal CT chest revealing bronchiectasis who is in for follow up evaluation. - stable pending urologic procedure \par ******************************************************Pre-op Clearance*************************************************************** \par \par The patient's SOB is felt to be multifactorial:\par -poor mechanics of breathing\par -out of shape/overweight\par -Pulmonary\par    - mild asthma\par -Cardiac\par \par Problem 1: mild asthma \par -add Trelegy (100) 1 inhalation qD \par - Asthma is believed to be caused by inherited (genetic) and environmental factor, but its exact cause is unknown. asthma may be triggered by allergens, lung infections, or irritants in the air. Asthma triggers are different for each person\par - Inhaler technique reviewed as well as oral hygiene technique reviewed with patient. Avoidance of cold air, extremes of temperature, rescue inhaler should be used before exercise. Order of medication reviewed with patient. Recommended use of a cool mist humidifier in the bedroom. \par \par Problem 1A: Pulmonary Pre-Op Clearance for Urologic Procedure\par -at this point in time there are no absolute pulmonary contraindications to go forward with the planned procedure \par -at the time of surgery s/he should have optimal pain control, incentive spirometry, early ambulation, DVT and GI prophylaxis. \par \par Problem 2: chronic mucous production ddx\par 1. allergies / sinus \par 2. asthma \par 3. LPR \par \par Problem 2: allergies / sinus \par s/p given for blood work: asthma profile, food IgE panel, eosinophil level, IgE level, Vitamin D level- wnl\par -Environmental measures for allergies were encouraged including mattress and pillow cover, air purifier, and environmental controls.\par \par \par Problem 3: .Bronchiectasis and focal RML volume loss\par - s/p strep pneumonia titers low, quantitative immunoglobulins low IgM, IgG subclasses\par -complete Prevnar 20 vaccine\par - s/p sputum cultures AFBx3 (-) 5/2023\par - Follow up CT scan in 6 months to re-assess- next 5/2023\par -correlation with smoking hx\par -can contribute to cough/mucus\par -discussed condition in detail with patient\par -offered acapella to patient- not interested in use \par - Seen on the CT of the chest or chest x-ray signifies damaged bronchial tubes focal or diffuse which can be sites of recurrent infections. These areas can be colonized by various organisms including bacteria (hemophilous influenza/Pseudomonas species etc.) as well as acid fast bacilli (myobacterial disease- inclusive of TB/KELSIE etc.). Sputum either for bacteria culture/sensitivity or AFB culture and sensitivity will need to be sent if the patient has sputum- 3 specimens on consecutive days will need to be dropped at the laboratory- if the patient can produce sputum. \par \par Problem 3A: Abnormal CT - Bronchiectasis \par - f/p CT from [place] ([facility]) - next 5/2023 (pending)\par - CAT scans are the only radiological modality to identify abnormality to identify abnormalities w/in the lings with regards to nodules/masses/lymph nodes. Risks, benefits were reviewed in detail. The guidelines for abnormalities include follow up CT scans at various intervals which could range from 6 weeks to 1 year intervals. If there is a change for the worse then considerations for a biopsy will be considered if you are a candidate. Second opinion evaluation with thoracic surgeon or an interventional radiologist could be offered, \par \par Problem 4.GERD/LPR-stable\par -add Pepcid 40 mg QHS\par -Rule of 2s: avoid eating too much, eating too late, eating too spicy, eating two hours before bed.\par - Things to avoid including overeating, spicy foods, tight clothing, eating within two hours of bed, this list is not all inclusive.\par - For treatments of reflux, possible options discussed including diet control, H2 blockers, PPIs, as well as coating motility agents discussed as treatment options. Timing of meals and proximity of last meal to sleep were discussed. If symptoms persist, a formal gastrointestinal evaluation is needed.\par \par Problem 5: Cardiac \par - recommended follow up with cardiologist if needed / Dr. Arnold Ward \par \par Problem 6: Poor sleep\par -related to bladder issues\par -reports last sleep test was 1 year ago and was negative for sleep apnea\par \par \par Problem 7 : Health Maintenance\par -s/p flu shot 2022\par -recommended strep pneumonia vaccines: Prevnar-20 vaccine 11/2022, follow by Pneumo vaccine 23 one year following\par -recommended early intervention for URIs\par -recommended regular osteoporosis evaluations\par -recommended early dermatological evaluations\par -recommended after the age of 50 to the age of 70, colonoscopy every 5 years\par \par f/u in 6-8 weeks\par pt is encouraged to call or fax the office with any questions or concerns.\par \par \par

## 2023-06-19 NOTE — PROCEDURE
[FreeTextEntry1] : Feno was 7; a normal value being less than 25. Fractional exhaled nitric oxide (FENO) is regarded as a simple, noninvasive method for assessing eosinophilic airway inflammation. Produced by a variety of cells within the lung, nitric oxide (NO) concentrations are generally low in healthy individuals. However, high concentrations of NO appear to be involved in nonspecific host defense mechanisms and chronic inflammatory  diseases such as asthma. The American Thoracic Society (ATS) therefore recommended using FENO to aid in the diagnosis and monitoring of eosinophilic airway inflammation and asthma, and for identifying steroid responsive individuals whose chronic respiratory symptoms may be caused by airway inflammation \par \par PFT revealed normal flows, with a FEV1 of 2.81L, which is 104% of predicted, with an abnormal inspiratory limb\par \par 6 minute walk test reveals a low saturation of 97 with evidence of moderate dyspnea or fatigue; walked 391.2 meters

## 2023-06-19 NOTE — HISTORY OF PRESENT ILLNESS
[TextBox_4] : This is a 77 year old male with PMHx of Afib, colon CA, DM, HTN, HLD, LPR, diverticulosis, lumbar rediculopathy, bladder cancer s/p bladder tumor resection in 7/2020, Hep C, LVH, dilated aortic root, sciatica, otalgia of R eye, tachybrady syndrome, active cigar smoker and now an abnormal CT chest revealing bronchiectasis who is in for follow up evaluation. \par \par -he notes he will have surgery for bladder cancer\par -he denies blood in the urine\par -he notes his energy levels are good \par -he notes mucus production in the morning which clears as the day progresses\par -he notes that he is no longer smoking\par -he notes that his appetite is not good \par \par \par -patient denies any headaches, nausea, vomiting, fever, chills, sweats, chest pain, chest pressure, palpitations, coughing, wheezing, fatigue, diarrhea, constipation, dysphagia, myalgias, dizziness, leg swelling, leg pain, itchy eyes, itchy ears, heartburn, reflux or sour taste in the mouth

## 2023-06-19 NOTE — ED PROVIDER NOTE - PROGRESS NOTE DETAILS
Patient called and said she has never gotten results from wearing the monitor and would like to know results. Dr. Vargas admits to hospitalist service. Dr. Vargas admits to hospitalist service.  Bacitracin opthalmic ointment qid; optho will follow as inpatient.

## 2023-06-19 NOTE — PHYSICAL EXAM
[No Acute Distress] : no acute distress [Normal Oropharynx] : normal oropharynx [III] : Mallampati Class: III [Normal Appearance] : normal appearance [No Neck Mass] : no neck mass [Normal Rate/Rhythm] : normal rate/rhythm [Normal S1, S2] : normal s1, s2 [No Murmurs] : no murmurs [No Resp Distress] : no resp distress [Clear to Auscultation Bilaterally] : clear to auscultation bilaterally [No Abnormalities] : no abnormalities [Benign] : benign [Normal Gait] : normal gait [No Clubbing] : no clubbing [No Cyanosis] : no cyanosis [No Edema] : no edema [FROM] : FROM [Normal Color/ Pigmentation] : normal color/ pigmentation [No Focal Deficits] : no focal deficits [Oriented x3] : oriented x3 [Normal Mood] : normal mood [Normal Affect] : normal affect [TextBox_2] : kyphotic  [TextBox_68] : I:E 1:3; Clear

## 2023-06-26 ENCOUNTER — TRANSCRIPTION ENCOUNTER (OUTPATIENT)
Age: 78
End: 2023-06-26

## 2023-06-27 ENCOUNTER — OUTPATIENT (OUTPATIENT)
Dept: OUTPATIENT SERVICES | Facility: HOSPITAL | Age: 78
LOS: 1 days | Discharge: HOME | End: 2023-06-27
Payer: MEDICARE

## 2023-06-27 ENCOUNTER — APPOINTMENT (OUTPATIENT)
Dept: UROLOGY | Facility: HOSPITAL | Age: 78
End: 2023-06-27

## 2023-06-27 ENCOUNTER — TRANSCRIPTION ENCOUNTER (OUTPATIENT)
Age: 78
End: 2023-06-27

## 2023-06-27 VITALS
RESPIRATION RATE: 16 BRPM | OXYGEN SATURATION: 99 % | DIASTOLIC BLOOD PRESSURE: 78 MMHG | SYSTOLIC BLOOD PRESSURE: 135 MMHG | TEMPERATURE: 98 F | HEART RATE: 95 BPM | WEIGHT: 169.98 LBS | HEIGHT: 68 IN

## 2023-06-27 VITALS
OXYGEN SATURATION: 97 % | RESPIRATION RATE: 15 BRPM | DIASTOLIC BLOOD PRESSURE: 82 MMHG | HEART RATE: 98 BPM | SYSTOLIC BLOOD PRESSURE: 121 MMHG

## 2023-06-27 DIAGNOSIS — Z98.890 OTHER SPECIFIED POSTPROCEDURAL STATES: Chronic | ICD-10-CM

## 2023-06-27 DIAGNOSIS — Z92.241 PERSONAL HISTORY OF SYSTEMIC STEROID THERAPY: Chronic | ICD-10-CM

## 2023-06-27 DIAGNOSIS — C67.9 MALIGNANT NEOPLASM OF BLADDER, UNSPECIFIED: ICD-10-CM

## 2023-06-27 DIAGNOSIS — Z98.89 OTHER SPECIFIED POSTPROCEDURAL STATES: Chronic | ICD-10-CM

## 2023-06-27 DIAGNOSIS — Z90.49 ACQUIRED ABSENCE OF OTHER SPECIFIED PARTS OF DIGESTIVE TRACT: Chronic | ICD-10-CM

## 2023-06-27 LAB
GLUCOSE BLDC GLUCOMTR-MCNC: 41 MG/DL — CRITICAL LOW (ref 70–99)
GLUCOSE BLDC GLUCOMTR-MCNC: 99 MG/DL — SIGNIFICANT CHANGE UP (ref 70–99)

## 2023-06-27 PROCEDURE — 82962 GLUCOSE BLOOD TEST: CPT

## 2023-06-27 PROCEDURE — 52214 CYSTOSCOPY AND TREATMENT: CPT

## 2023-06-27 RX ORDER — ONDANSETRON 8 MG/1
4 TABLET, FILM COATED ORAL ONCE
Refills: 0 | Status: DISCONTINUED | OUTPATIENT
Start: 2023-06-27 | End: 2023-07-11

## 2023-06-27 RX ORDER — CEFAZOLIN SODIUM 1 G
2000 VIAL (EA) INJECTION ONCE
Refills: 0 | Status: COMPLETED | OUTPATIENT
Start: 2023-06-27 | End: 2023-06-27

## 2023-06-27 RX ORDER — FENTANYL CITRATE 50 UG/ML
25 INJECTION INTRAVENOUS
Refills: 0 | Status: DISCONTINUED | OUTPATIENT
Start: 2023-06-27 | End: 2023-06-27

## 2023-06-27 RX ORDER — LIDOCAINE HCL 20 MG/ML
0.2 VIAL (ML) INJECTION ONCE
Refills: 0 | Status: COMPLETED | OUTPATIENT
Start: 2023-06-27 | End: 2023-06-27

## 2023-06-27 RX ADMIN — SODIUM CHLORIDE 100 MILLILITER(S): 9 INJECTION, SOLUTION INTRAVENOUS at 13:12

## 2023-06-27 NOTE — ASU PATIENT PROFILE, ADULT - FALL HARM RISK - UNIVERSAL INTERVENTIONS
Bed in lowest position, wheels locked, appropriate side rails in place/Call bell, personal items and telephone in reach/Instruct patient to call for assistance before getting out of bed or chair/Non-slip footwear when patient is out of bed/[place] to call system/Physically safe environment - no spills, clutter or unnecessary equipment/Purposeful Proactive Rounding/Room/bathroom lighting operational, light cord in reach

## 2023-06-27 NOTE — ASU DISCHARGE PLAN (ADULT/PEDIATRIC) - NS MD DC FALL RISK RISK
For information on Fall & Injury Prevention, visit: [email or web address] OR  [email or web address] OR  https://www.cdc.gov/steadi/patient.html

## 2023-06-27 NOTE — ASU PATIENT PROFILE, ADULT - NSICDXPASTMEDICALHX_GEN_ALL_CORE_FT
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 follow up after an year, last visit 11/2022    Chronic back pain -on oxycodone prn and tylenol    Diabetes mellitus, type 2     Epistaxis hospitalized 3/2021- resolved    Hepatitis C Pt was treated 7 years ago    History of peripheral neuropathy     History of sciatica leg pains    LPRD (laryngopharyngeal reflux disease)     Lumbar disc disease     Malignant neoplasm of colon, unspecified part of colon s/p microsurgery at [facility]  no chemo/XRT in remission since 2012

## 2023-06-27 NOTE — ASU PREOP CHECKLIST - HAND OFF
flomax can be invcreased to 2 daily. [facility] Laboratory Locations - No appointment necessary. Sites open Monday to Friday. @ indicates the location is open Saturdays. Call your preferred location for test preparation, business hours and other information you need. [facility] accepts [employer]. [facility]    @ [place] [facility] [address]   Ph: [phone] [facility] Lab Svcs. [address]   Ph: [phone]  @ [place] [facility] [address] [facility]   Ph: [phone]    [facility] S[facility]. [address] [facility] [address]   Ph: [address] Lab Svcs. [address]  Ph: [phone] [facility] Lab Svcs. 8[address] [facility]. Edmund Alaf[address] [place] [address]   Ph: [phone]      [facility] S[facility]. [address] [facility], [address] [facility]   Ph: [phone] [facility] [address] [facility] Svcs. [address] [place] [address]   Ph: [phone] [facility] Lab Svcs. [address] [facility], [address]   Ph: [phone] [address] [place] [address]. Lab Svcs. 211 [facility], [address] [facility]   Ph: [address] Lab Svcs[address] [facility] [employer] [place] [address]   Ph: [phone] [facility]0 [facility]. Lab Svcs.   54 [facility]   Ph: [phone]
Holding RN to OR RN

## 2023-06-27 NOTE — ASU DISCHARGE PLAN (ADULT/PEDIATRIC) - ASU DC SPECIAL INSTRUCTIONSFT
ACTIVITY: No heavy lifting or straining. Otherwise, you may return to your usual level of physical activity.   DIET: Return to your usual diet.  NOTIFY YOUR SURGEON IF: You may notice some blood in your urine following the procedure. This is normal and to be expected. However, if you become unable to empty your bladder or you notice large amounts of blood passing into your urine with clots, please call the office. Also call the office if you have any fever (over 100.4 F) or chills, persistent nausea/vomiting, or if your pain is not controlled on your discharge pain medications.  FOLLOW-UP:  1. Please call to make a follow-up appointment within one week of discharge

## 2023-06-27 NOTE — ASU DISCHARGE PLAN (ADULT/PEDIATRIC) - NURSING INSTRUCTIONS
You received a dose of Tylenol today at 2:30 PM. If needed, next dose time is 8:30 PM this evening. Do not exceed 4,000 mg of Tylenol in a 24 hour period.

## 2023-06-27 NOTE — ASU PATIENT PROFILE, ADULT - VISION (WITH CORRECTIVE LENSES IF THE PATIENT USUALLY WEARS THEM):
Partially impaired: cannot see medication labels or newsprint, but can see obstacles in path, and the surrounding layout; can count fingers at arm's length left eye blindness/Partially impaired: cannot see medication labels or newsprint, but can see obstacles in path, and the surrounding layout; can count fingers at arm's length

## 2023-06-27 NOTE — ASU DISCHARGE PLAN (ADULT/PEDIATRIC) - CARE PROVIDER_API CALL
Todd Caicedo  Urology  [address] [place] [address], [facility] [address]  [place] [address]  Phone: [phone]  Fax: [phone]  Follow Up Time:

## 2023-07-06 RX ORDER — NYSTATIN 100000 [USP'U]/G
100000 CREAM TOPICAL
Qty: 60 | Refills: 0 | Status: ACTIVE | COMMUNITY
Start: 2022-09-21 | End: 1900-01-01

## 2023-07-17 ENCOUNTER — APPOINTMENT (OUTPATIENT)
Dept: CARDIOLOGY | Facility: CLINIC | Age: 78
End: 2023-07-17
Payer: MEDICARE

## 2023-07-17 VITALS
OXYGEN SATURATION: 98 % | BODY MASS INDEX: 27.64 KG/M2 | SYSTOLIC BLOOD PRESSURE: 120 MMHG | HEIGHT: 66 IN | WEIGHT: 172 LBS | TEMPERATURE: 98 F | DIASTOLIC BLOOD PRESSURE: 80 MMHG | HEART RATE: 58 BPM

## 2023-07-17 PROCEDURE — 99213 OFFICE O/P EST LOW 20 MIN: CPT

## 2023-07-17 NOTE — HISTORY OF PRESENT ILLNESS
[FreeTextEntry1] : TAJ HUBBARD  is a 77 year old M w/ pmhx of, Afib, pre DM , thoracic aortic aneurysm (4.2cm), bladder CA, and colon CA, Hep C s/p tx  who presents today for routine follow up. The patient denies fever, chills, sore throat, loss of taste or smell, muscle aches weight loss, malaise, rash, recent travel, insect bites, alteration bowel habits, headaches, weakness, abdominal  pain, bloating, changes in urination, visual disturbances, shortness of breath, chest pain, dizziness, palpitations.\par \par The patient is also here for follow-up of atrial fibrillation and currently they feel well with an occasional episode of palpitations.  The patient states they are reliably taking the anticoagulation medicine and are not having any signs of bleeding they deny any dizziness headaches nosebleeds or black tarry stools\par \par The patient is also here for f/u of pre-diabetes’s  on prior blood test  the patient is trying to follow a low carb diet and avoid simple sugars. they deny excessive thirst or urination and any blurred vision.\par \par Pt noted to have high serum protein on labs and was referred to Dr. Goldberg who is following. Indicates Dr. Goldberg performed extensive w/u which revealed no extensive pathology without need for further f/u.\par

## 2023-07-21 LAB
ALBUMIN SERPL ELPH-MCNC: 4.2 G/DL
ALP BLD-CCNC: 59 U/L
ALT SERPL-CCNC: 11 U/L
ANION GAP SERPL CALC-SCNC: 13 MMOL/L
APO B SERPL-MCNC: 76 MG/DL
AST SERPL-CCNC: 17 U/L
BILIRUB DIRECT SERPL-MCNC: 0.2 MG/DL
BILIRUB INDIRECT SERPL-MCNC: 0.4 MG/DL
BILIRUB SERPL-MCNC: 0.6 MG/DL
BUN SERPL-MCNC: 17 MG/DL
CALCIUM SERPL-MCNC: 9.4 MG/DL
CHLORIDE SERPL-SCNC: 107 MMOL/L
CHOLEST SERPL-MCNC: 144 MG/DL
CK SERPL-CCNC: 59 U/L
CO2 SERPL-SCNC: 20 MMOL/L
CREAT SERPL-MCNC: 1.08 MG/DL
CRP SERPL HS-MCNC: 4.51 MG/L
EGFR: 70 ML/MIN/1.73M2
ESTIMATED AVERAGE GLUCOSE: 114 MG/DL
GLUCOSE SERPL-MCNC: 81 MG/DL
HBA1C MFR BLD HPLC: 5.6 %
HCT VFR BLD CALC: 42.6 %
HDLC SERPL-MCNC: 51 MG/DL
HGB BLD-MCNC: 14.5 G/DL
LDLC SERPL CALC-MCNC: 80 MG/DL
LDLC SERPL DIRECT ASSAY-MCNC: 85 MG/DL
MCHC RBC-ENTMCNC: 34 GM/DL
MCHC RBC-ENTMCNC: 34.7 PG
MCV RBC AUTO: 101.9 FL
NONHDLC SERPL-MCNC: 94 MG/DL
PLATELET # BLD AUTO: 201 K/UL
POTASSIUM SERPL-SCNC: 4.3 MMOL/L
PROT SERPL-MCNC: 6.9 G/DL
RBC # BLD: 4.18 M/UL
RBC # FLD: 12.6 %
SODIUM SERPL-SCNC: 140 MMOL/L
TRIGL SERPL-MCNC: 68 MG/DL
WBC # FLD AUTO: 7.76 K/UL

## 2023-07-24 ENCOUNTER — APPOINTMENT (OUTPATIENT)
Dept: CT IMAGING | Facility: IMAGING CENTER | Age: 78
End: 2023-07-24

## 2023-07-24 ENCOUNTER — OUTPATIENT (OUTPATIENT)
Dept: OUTPATIENT SERVICES | Facility: HOSPITAL | Age: 78
LOS: 1 days | End: 2023-07-24
Payer: MEDICARE

## 2023-07-24 DIAGNOSIS — J47.9 BRONCHIECTASIS, UNCOMPLICATED: ICD-10-CM

## 2023-07-24 DIAGNOSIS — Z98.890 OTHER SPECIFIED POSTPROCEDURAL STATES: Chronic | ICD-10-CM

## 2023-07-24 DIAGNOSIS — Z92.241 PERSONAL HISTORY OF SYSTEMIC STEROID THERAPY: Chronic | ICD-10-CM

## 2023-07-24 DIAGNOSIS — Z90.49 ACQUIRED ABSENCE OF OTHER SPECIFIED PARTS OF DIGESTIVE TRACT: Chronic | ICD-10-CM

## 2023-07-24 DIAGNOSIS — Z98.89 OTHER SPECIFIED POSTPROCEDURAL STATES: Chronic | ICD-10-CM

## 2023-07-24 PROCEDURE — 71250 CT THORAX DX C-: CPT | Mod: 26

## 2023-07-24 PROCEDURE — 71250 CT THORAX DX C-: CPT

## 2023-08-08 NOTE — PRE-ANESTHESIA EVALUATION ADULT - NSATTENDATTESTRD_GEN_ALL_CORE
[Nl] : Neurological
The patient has been re-examined and I agree with the above assessment or I updated with my findings.

## 2023-08-09 ENCOUNTER — RX RENEWAL (OUTPATIENT)
Age: 78
End: 2023-08-09

## 2023-09-04 NOTE — H&P PST ADULT - BSA (M2)
Not sure why his medicare wellness form did not show up correctly and if it will count?   Please forward to [facility] 1.94

## 2023-10-26 ENCOUNTER — RX RENEWAL (OUTPATIENT)
Age: 78
End: 2023-10-26

## 2023-10-31 ENCOUNTER — APPOINTMENT (OUTPATIENT)
Dept: CARDIOLOGY | Facility: CLINIC | Age: 78
End: 2023-10-31
Payer: MEDICARE

## 2023-10-31 ENCOUNTER — NON-APPOINTMENT (OUTPATIENT)
Age: 78
End: 2023-10-31

## 2023-10-31 VITALS
DIASTOLIC BLOOD PRESSURE: 70 MMHG | WEIGHT: 165 LBS | TEMPERATURE: 97.6 F | BODY MASS INDEX: 26.52 KG/M2 | SYSTOLIC BLOOD PRESSURE: 138 MMHG | HEART RATE: 81 BPM | OXYGEN SATURATION: 99 % | HEIGHT: 66 IN

## 2023-10-31 DIAGNOSIS — E11.9 TYPE 2 DIABETES MELLITUS W/OUT COMPLICATIONS: ICD-10-CM

## 2023-10-31 DIAGNOSIS — R09.81 NASAL CONGESTION: ICD-10-CM

## 2023-10-31 PROCEDURE — 93000 ELECTROCARDIOGRAM COMPLETE: CPT

## 2023-10-31 PROCEDURE — 99214 OFFICE O/P EST MOD 30 MIN: CPT | Mod: 25

## 2024-01-22 NOTE — H&P PST ADULT - VENOUS THROMBOEMBOLISM SCORE
Dr. Blanco  Office [phone] (9 am to 5 pm)  Service: [phone] (5pm to 9am)  Rosario RODRIGES      RENAL PROGRESS NOTE: DATE OF SERVICE 01-22-24 @ 17:12    Patient is a 76y old  Male who presents with a chief complaint of Hypotension, lethargu (22 Jan 2024 14:17)      Patient seen and examined at bedside. No chest pain/sob    VITALS:  T(F): 97.9 (01-22-24 @ 07:30), Max: 98.3 (01-21-24 @ 18:16)  HR: 90 (01-22-24 @ 09:36)  BP: 120/74 (01-22-24 @ 07:30)  RR: 16 (01-22-24 @ 07:30)  SpO2: 99% (01-22-24 @ 09:36)  Wt(kg): --        PHYSICAL EXAM:  Constitutional: NAD  Neck: No JVD  Respiratory: CTAB, no wheezes, rales or rhonchi  Cardiovascular: S1, S2, RRR  Gastrointestinal: BS+, soft, NT/ND  Extremities: No peripheral edema    Hospital Medications:   MEDICATIONS  (STANDING):  albuterol/ipratropium for Nebulization 3 milliLiter(s) Nebulizer every 12 hours  chlorhexidine 2% Cloths 1 Application(s) Topical daily  cholecalciferol 1000 Unit(s) Oral daily  cyanocobalamin 1000 MICROGram(s) Oral daily  donepezil 10 milliGRAM(s) Oral at bedtime  folic acid 1 milliGRAM(s) Oral daily  heparin   Injectable 5000 Unit(s) SubCutaneous every 8 hours  memantine 10 milliGRAM(s) Oral two times a day  risperiDONE   Tablet 0.25 milliGRAM(s) Oral daily  risperiDONE   Tablet 0.5 milliGRAM(s) Oral at bedtime  sertraline 50 milliGRAM(s) Oral daily  sodium chloride 0.9%. 1000 milliLiter(s) (75 mL/Hr) IV Continuous <Continuous>  sodium chloride 0.9%. 1000 milliLiter(s) (75 mL/Hr) IV Continuous <Continuous>      LABS:        Creatinine Trend: 0.87 <--            Urine Studies:  Urinalysis - [01-16-24 @ 14:53]      Color  / Appearance  / SG  / pH       Gluc 93 / Ketone   / Bili  / Urobili        Blood  / Protein  / Leuk Est  / Nitrite       RBC  / WBC  / Hyaline  / Gran  / Sq Epi  / Non Sq Epi  / Bacteria     Urine Sodium 80      [01-15-24 @ 18:21]  Urine Osmolality 515      [01-15-24 @ 18:21]    PTH -- (Ca --)      [01-14-24 @ 06:50]   5  PTH -- (Ca --)      [01-13-24 @ 06:10]   6  Vitamin D (25OH) 36.9      [01-13-24 @ 06:10]        RADIOLOGY & ADDITIONAL STUDIES:   4

## 2024-01-29 NOTE — ASU PREOP CHECKLIST - HEART RATE (BEATS/MIN)
Kiran Galvez  1939 is a 84 y.o. male ,Established patient, here for evaluation of the following chief complaint(s):   Chief Complaint   Patient presents with    Follow-up     2 month- been having trouble sleeping not sure what's going on- has tired melentoin but that hasn't seemed to help, still having trouble with his insulin when he woke up sugar was 182 so did not do the insulin, also having pain in bottom of feet. This did just start. Is wondering if he should use a wheel chair has a walker but normally just used his cane.          1. History of bladder cancer--he had a CT ABD about middle of last year and an MRI WAS RECOMMENDED as it showed a possible pancreatic mass.  Assessment & Plan:   Monitored by specialist- no acute findings meriting change in the plan  2. Mass of head of pancreas--upon several visits with me attempts were made  to set up mri----patient declined due to '' having to be there for his wife'' He finally did go but was concerned his sugar was dropping and cancelled the test.    3. Pain in both feet--feet appear erythematous and swollen---CONCERN OVER CELLULITIS AND LEGS ARE TIGHT AND SWOLLEN AND I AM CONCERNED ABOUT CELLULITIS OR DEEP VEIN THROMBOSIS.    4. Uncontrolled type 2 diabetes mellitus with hyperglycemia (HCC)--SUGARS ARE BECOMING OUT OF CONTROL AND CONCERN OVER PANCREATIC MASS WHICH COULD BE CANCEROUS------patient is aware of this danger. In the past he has refused admission to take care of these problems but I have encouraged him to be admitted if told to do so.  The problems that are worsening now could lead to death and then he would not be able to take care of his wife.    5. Stage 3b chronic kidney disease (CKD) (HCC)--chronic ; uncertain course in light of above worsening problems.    RECOMMENDATION MADE TO BE SEEN IN ER.  WAS ABLE TO SPEAK WITH TRIAGE NURSE . APPARENTLY PATIENT'S WIFE IS ALSO GOING DOWNTOWN FOR HEAD CT AND SHE IS ABLE TO DRIVE.        No follow-ups  62

## 2024-02-05 NOTE — CARDIOLOGY SUMMARY
[de-identified] : 2/2021, 1. Mitral annular calcification, otherwise normal mitral valve. Mild mitral regurgitation. 2. Calcified trileaflet aortic valve with normal opening. Mild aortic regurgitation. 3. Normal left ventricular internal dimensions and wall thicknesses. 4. Normal left ventricular systolic function. No segmental wall motion abnormalities. 5. Normal right ventricular size and function. \par 3/2/2021: Mitral annular calcification,  Moderate mitral \par regurgitation.  Vena contracta width about  0.4 cm. Calcified trileaflet aortic \par valve  Minimal aortic regurgitation. Mild non-mobile atherosclerotic \par plaque in the aortic arch and descending thoracic aorta. evidence of a \par patent foramen ovale 
Attending Attestation (For Attendings USE Only)...

## 2024-02-14 NOTE — H&P PST ADULT - PRIMARY CARE PROVIDER
Dr. Shana Nix [phone] [Asthma] : asthma [Follow-Up] : a follow-up visit [FreeTextEntry1] : abnormal PFTs, GERD, low testosterone, STEPHANIE on CPAP, overweight, poor sleep, RLS, COVID-19 11/2022 asthma and SOB

## 2024-02-29 ENCOUNTER — NON-APPOINTMENT (OUTPATIENT)
Age: 79
End: 2024-02-29

## 2024-02-29 ENCOUNTER — APPOINTMENT (OUTPATIENT)
Dept: CARDIOLOGY | Facility: CLINIC | Age: 79
End: 2024-02-29
Payer: MEDICARE

## 2024-02-29 DIAGNOSIS — I10 ESSENTIAL (PRIMARY) HYPERTENSION: ICD-10-CM

## 2024-02-29 DIAGNOSIS — I77.810 THORACIC AORTIC ECTASIA: ICD-10-CM

## 2024-02-29 DIAGNOSIS — J47.9 BRONCHIECTASIS, UNCOMPLICATED: ICD-10-CM

## 2024-02-29 DIAGNOSIS — Z01.818 ENCOUNTER FOR OTHER PREPROCEDURAL EXAMINATION: ICD-10-CM

## 2024-02-29 PROCEDURE — 99214 OFFICE O/P EST MOD 30 MIN: CPT

## 2024-02-29 PROCEDURE — G2211 COMPLEX E/M VISIT ADD ON: CPT

## 2024-02-29 PROCEDURE — 93000 ELECTROCARDIOGRAM COMPLETE: CPT

## 2024-03-01 DIAGNOSIS — E87.5 HYPERKALEMIA: ICD-10-CM

## 2024-03-01 LAB
ALBUMIN SERPL ELPH-MCNC: 4.4 G/DL
ALP BLD-CCNC: 65 U/L
ALT SERPL-CCNC: 20 U/L
ANION GAP SERPL CALC-SCNC: 14 MMOL/L
APO B SERPL-MCNC: 82 MG/DL
AST SERPL-CCNC: 23 U/L
BILIRUB DIRECT SERPL-MCNC: 0.2 MG/DL
BILIRUB INDIRECT SERPL-MCNC: 0.6 MG/DL
BILIRUB SERPL-MCNC: 0.7 MG/DL
BUN SERPL-MCNC: 15 MG/DL
CALCIUM SERPL-MCNC: 9.9 MG/DL
CHLORIDE SERPL-SCNC: 106 MMOL/L
CHOLEST SERPL-MCNC: 178 MG/DL
CK SERPL-CCNC: 49 U/L
CO2 SERPL-SCNC: 23 MMOL/L
CREAT SERPL-MCNC: 0.91 MG/DL
CRP SERPL HS-MCNC: 0.54 MG/L
EGFR: 86 ML/MIN/1.73M2
ESTIMATED AVERAGE GLUCOSE: 111 MG/DL
GLUCOSE SERPL-MCNC: 83 MG/DL
HBA1C MFR BLD HPLC: 5.5 %
HDLC SERPL-MCNC: 55 MG/DL
LDLC SERPL CALC-MCNC: 106 MG/DL
LDLC SERPL DIRECT ASSAY-MCNC: 108 MG/DL
NONHDLC SERPL-MCNC: 123 MG/DL
POTASSIUM SERPL-SCNC: 5.5 MMOL/L
PROT SERPL-MCNC: 7.6 G/DL
SODIUM SERPL-SCNC: 142 MMOL/L
TRIGL SERPL-MCNC: 94 MG/DL

## 2024-03-01 RX ORDER — SODIUM POLYSTYRENE SULFONATE 4.1 MEQ/G
POWDER, FOR SUSPENSION ORAL; RECTAL DAILY
Qty: 60 | Refills: 0 | Status: ACTIVE | COMMUNITY
Start: 2024-03-01 | End: 1900-01-01

## 2024-03-01 NOTE — HISTORY OF PRESENT ILLNESS
[FreeTextEntry1] : TAJ HUBBARD  is a 78 year old M w/ pmhx of pmhx of, Afib, pre DM , thoracic aortic aneurysm (4.2cm), bladder CA, and colon CA, Hep C  who presents today for routine follow up. The patient denies fever, chills, sore throat, loss of taste or smell, muscle aches weight loss, malaise, rash, recent travel, insect bites, alteration bowel habits, headaches, weakness, abdominal  pain, bloating, changes in urination, visual disturbances, shortness of breath, chest pain, dizziness, palpitations.   The patient is also here for follow-up of atrial fibrillation and currently they feel well with an occasional episode of palpitations.  The patient states they are reliably taking the anticoagulation medicine and are not having any signs of bleeding they deny any dizziness headaches nosebleeds or black tarry stools .He stopped his eliquis .  The patient is also here for f/u of pre-diabetess  on prior blood test  the patient is trying to follow a low carb diet and avoid simple sugars. they deny excessive thirst or urination and any blurred vision.  pt is here for follow up of dilated aorta they have been taking medication as prescribed  and  blood pressure have been observed in the a reasonable range, their are no reports of worrisome symptoms, such as tearing or "knife like' back pain, chest pain or syncope.  I was asked to see this delightful patient today for Pre-op Evaluation  prior to  __bladder instillation___  with   Dr. Pena____  at fax number  . The patient denies fever, cough, wheezing, sputum production, hemoptysis, dyspnea, congestion, diarrhea, constipation, nausea, vomiting, bright red blood per rectum, melena, angina, chest pain, palpitations, diaphoresis, PND, incontinence, frequency, urgency, dysuria, edema, joint pain, headache, weakness, numbness and dizziness. The date of the planned procedure is: __TBD_________

## 2024-03-01 NOTE — PHYSICAL EXAM
[Well Developed] : well developed [Well Nourished] : well nourished [No Acute Distress] : no acute distress [Normal Conjunctiva] : normal conjunctiva [Normal Venous Pressure] : normal venous pressure [No Carotid Bruit] : no carotid bruit [Clear Lung Fields] : clear lung fields [No Respiratory Distress] : no respiratory distress  [Good Air Entry] : good air entry [Soft] : abdomen soft [Non Tender] : non-tender [No Masses/organomegaly] : no masses/organomegaly [Normal Bowel Sounds] : normal bowel sounds [Normal Gait] : normal gait [No Edema] : no edema [No Cyanosis] : no cyanosis [No Clubbing] : no clubbing [No Varicosities] : no varicosities [No Skin Lesions] : no skin lesions [No Rash] : no rash [No Focal Deficits] : no focal deficits [Moves all extremities] : moves all extremities [Alert and Oriented] : alert and oriented [Normal Speech] : normal speech [Normal memory] : normal memory [de-identified] : Regular rate and rhythm, NL S1, S2, non-displaced PMI, chest non-tender; no rubs,heaves  or gallops a  Grade 2/6 systolic murmur noted at the LSB

## 2024-03-03 ENCOUNTER — TRANSCRIPTION ENCOUNTER (OUTPATIENT)
Age: 79
End: 2024-03-03

## 2024-03-06 NOTE — PRE-ANESTHESIA EVALUATION ADULT - ANESTHESIA, PREVIOUS REACTION, PROFILE
The patient's goals for the shift include Rest and iv atb    The clinical goals for the shift include pt will have adequate pain control      Problem: Pain  Goal: My pain/discomfort is manageable  Outcome: Progressing     Problem: Safety  Goal: Patient will be injury free during hospitalization  Outcome: Progressing  Goal: I will remain free of falls  Outcome: Progressing     Problem: Daily Care  Goal: Daily care needs are met  Outcome: Progressing     Problem: Psychosocial Needs  Goal: Demonstrates ability to cope with hospitalization/illness  Outcome: Progressing  Goal: Collaborate with me, my family, and caregiver to identify my specific goals  Outcome: Progressing     Problem: Discharge Barriers  Goal: My discharge needs are met  Outcome: Progressing      denies family hx/none

## 2024-03-07 LAB
ANION GAP SERPL CALC-SCNC: 13 MMOL/L
BUN SERPL-MCNC: 22 MG/DL
CALCIUM SERPL-MCNC: 9.6 MG/DL
CHLORIDE SERPL-SCNC: 106 MMOL/L
CO2 SERPL-SCNC: 23 MMOL/L
CREAT SERPL-MCNC: 1.11 MG/DL
EGFR: 68 ML/MIN/1.73M2
GLUCOSE SERPL-MCNC: 85 MG/DL
POTASSIUM SERPL-SCNC: 5.2 MMOL/L
SODIUM SERPL-SCNC: 141 MMOL/L

## 2024-03-18 ENCOUNTER — APPOINTMENT (OUTPATIENT)
Dept: UROLOGY | Facility: CLINIC | Age: 79
End: 2024-03-18

## 2024-03-21 ENCOUNTER — APPOINTMENT (OUTPATIENT)
Dept: PULMONOLOGY | Facility: CLINIC | Age: 79
End: 2024-03-21
Payer: MEDICARE

## 2024-03-21 VITALS
BODY MASS INDEX: 26.52 KG/M2 | RESPIRATION RATE: 16 BRPM | HEIGHT: 66 IN | TEMPERATURE: 97.4 F | SYSTOLIC BLOOD PRESSURE: 128 MMHG | HEART RATE: 69 BPM | DIASTOLIC BLOOD PRESSURE: 68 MMHG | OXYGEN SATURATION: 98 % | WEIGHT: 165 LBS

## 2024-03-21 DIAGNOSIS — Z01.811 ENCOUNTER FOR PREPROCEDURAL RESPIRATORY EXAMINATION: ICD-10-CM

## 2024-03-21 DIAGNOSIS — J45.909 UNSPECIFIED ASTHMA, UNCOMPLICATED: ICD-10-CM

## 2024-03-21 DIAGNOSIS — K21.9 GASTRO-ESOPHAGEAL REFLUX DISEASE W/OUT ESOPHAGITIS: ICD-10-CM

## 2024-03-21 DIAGNOSIS — R93.89 ABNORMAL FINDINGS ON DIAGNOSTIC IMAGING OF OTHER SPECIFIED BODY STRUCTURES: ICD-10-CM

## 2024-03-21 DIAGNOSIS — R06.02 SHORTNESS OF BREATH: ICD-10-CM

## 2024-03-21 PROCEDURE — 99214 OFFICE O/P EST MOD 30 MIN: CPT | Mod: 25

## 2024-03-21 PROCEDURE — 94010 BREATHING CAPACITY TEST: CPT

## 2024-03-21 NOTE — HISTORY OF PRESENT ILLNESS
[TextBox_4] : This is a 78 year old male with PMHx of Afib, colon CA, DM, HTN, HLD, LPR, diverticulosis, lumbar rediculopathy, bladder cancer s/p bladder tumor resection in 7/2020, Hep C, LVH, dilated aortic root, sciatica, otalgia of R eye, tachybrady syndrome, active cigar smoker and now an abnormal CT chest revealing bronchiectasis who is in for follow up evaluation.   -he notes postponing his surgery to check on the status of his polyps  -he denies exercising due to nerve damage in his legs  -he notes wanting to have back surgery but his heart doctor does not want him to  -he notes no longer having excess mucus production  -he notes cutting down on smoking (1 cigar every 2 days now)  -he notes being on metoprolol -he notes his breathing is stable -he notes bowels are regular -he notes energy levels are good -he notes diet is good -he notes appetite is stable -he notes good quality of sleep  -he denies any headaches, nausea, emesis, fever, chills, sweats, chest pain, chest pressure, coughing, wheezing, palpitations, diarrhea, constipation, dysphagia, vertigo, arthralgias, myalgias, leg swelling, itchy eyes, itchy ears, heartburn, reflux, or sour taste in the mouth.

## 2024-03-21 NOTE — ASSESSMENT
[FreeTextEntry1] : This is a 77 year old male with PMHx of Afib, colon CA, DM, HTN, HLD, LPR, diverticulosis, lumbar rediculopathy, bladder cancer s/p bladder tumor resection in 7/2020, Hep C, LVH, TAA, dilated aortic root, sciatica, otalgia of R eye, tachybrady syndrome, active cigar smoker and now an abnormal CT chest revealing bronchiectasis who is in for follow up evaluation. - stable pending urologic procedure- pulmonary stable    ****************************************Pre-op Clearance for Urologic Procedure *******************************************  -at this point in time there are no absolute pulmonary contraindications to go forward with the planned procedure  The patient's SOB is felt to be multifactorial: -poor mechanics of breathing -out of shape/overweight -Pulmonary    - mild asthma -Cardiac  Problem 1: mild asthma - controlled  -add Trelegy (100) 1 inhalation qD  - Asthma is believed to be caused by inherited (genetic) and environmental factor, but its exact cause is unknown. asthma may be triggered by allergens, lung infections, or irritants in the air. Asthma triggers are different for each person - Inhaler technique reviewed as well as oral hygiene technique reviewed with patient. Avoidance of cold air, extremes of temperature, rescue inhaler should be used before exercise. Order of medication reviewed with patient. Recommended use of a cool mist humidifier in the bedroom.   Problem 1A: Pulmonary Pre-Op Clearance for Urologic Procedure -at this point in time there are no absolute pulmonary contraindications to go forward with the planned procedure  -at the time of surgery s/he should have optimal pain control, incentive spirometry, early ambulation, DVT and GI prophylaxis.   Problem 2: chronic mucous production ddx - (quiet) 1. allergies / sinus  2. asthma  3. LPR   Problem 2: allergies / sinus  s/p given for blood work: asthma profile, food IgE panel, eosinophil level, IgE level, Vitamin D level- wnl -Environmental measures for allergies were encouraged including mattress and pillow cover, air purifier, and environmental controls.  Problem 3: .Bronchiectasis and focal RML volume loss - s/p strep pneumonia titers low, quantitative immunoglobulins low IgM, IgG subclasses -complete Prevnar 20 vaccine - s/p sputum cultures AFBx3 (-) 5/2023 - Follow up CT scan 7/2024 -correlation with smoking hx -can contribute to cough/mucus -discussed condition in detail with patient -offered acapella to patient- not interested in use  - Seen on the CT of the chest or chest x-ray signifies damaged bronchial tubes focal or diffuse which can be sites of recurrent infections. These areas can be colonized by various organisms including bacteria (hemophilous influenza/Pseudomonas species etc.) as well as acid fast bacilli (myobacterial disease- inclusive of TB/KELSIE etc.). Sputum either for bacteria culture/sensitivity or AFB culture and sensitivity will need to be sent if the patient has sputum- 3 specimens on consecutive days will need to be dropped at the laboratory- if the patient can produce sputum.   Problem 3A: Abnormal CT - Bronchiectasis  - f/p CT from [place] [facility] 7/[facility]24 - CAT scans are the only radiological modality to identify abnormality to identify abnormalities w/in the lings with regards to nodules/masses/lymph nodes. Risks, benefits were reviewed in detail. The guidelines for abnormalities include follow up CT scans at various intervals which could range from 6 weeks to 1 year intervals. If there is a change for the worse then considerations for a biopsy will be considered if you are a candidate. Second opinion evaluation with thoracic surgeon or an interventional radiologist could be offered,   Problem 4.GERD/LPR-stable -add Pepcid 40 mg QHS -Rule of 2s: avoid eating too much, eating too late, eating too spicy, eating two hours before bed. - Things to avoid including overeating, spicy foods, tight clothing, eating within two hours of bed, this list is not all inclusive. - For treatments of reflux, possible options discussed including diet control, H2 blockers, PPIs, as well as coating motility agents discussed as treatment options. Timing of meals and proximity of last meal to sleep were discussed. If symptoms persist, a formal gastrointestinal evaluation is needed.  Problem 5: Cardiac  - recommended follow up with cardiologist if needed / Dr. Arnold Ward   Problem 6: Poor sleep -related to bladder issues -reports last sleep test was 1 year ago and was negative for sleep apnea   Problem 7 : Health Maintenance -s/p flu shot 2023- refused -recommended strep pneumonia vaccines: Prevnar-20 vaccine 11/2022, follow by Pneumo vaccine 23 one year following -recommended early intervention for URIs -recommended regular osteoporosis evaluations -recommended early dermatological evaluations -recommended after the age of 50 to the age of 70, colonoscopy every 5 years  f/u in 4-6 months  pt is encouraged to call or fax the office with any questions or concerns.

## 2024-03-21 NOTE — PHYSICAL EXAM
[No Acute Distress] : no acute distress [Normal Oropharynx] : normal oropharynx [III] : Mallampati Class: III [Normal Appearance] : normal appearance [No Neck Mass] : no neck mass [Normal Rate/Rhythm] : normal rate/rhythm [No Murmurs] : no murmurs [Normal S1, S2] : normal s1, s2 [No Resp Distress] : no resp distress [Clear to Auscultation Bilaterally] : clear to auscultation bilaterally [Benign] : benign [Normal Gait] : normal gait [No Clubbing] : no clubbing [No Cyanosis] : no cyanosis [No Edema] : no edema [FROM] : FROM [Normal Color/ Pigmentation] : normal color/ pigmentation [Oriented x3] : oriented x3 [No Focal Deficits] : no focal deficits [Normal Mood] : normal mood [Normal Affect] : normal affect [Kyphosis] : kyphosis [TextBox_68] : I:E 1:3; Clear

## 2024-03-21 NOTE — PROCEDURE
[FreeTextEntry1] : PFTs revealed normal flows; FEV1 was  2.47L, which is 102.3% of predicted; normal flow volume loop.  PFTs were performed to evaluate for SOB

## 2024-03-21 NOTE — ADDENDUM
[FreeTextEntry1] : Documented by Joe Meza acting as a scribe for Dr. Matt Loera on 03/21/2024. All medical record entries made by the Scribe were at my, Dr. Matt Loera's, direction and personally dictated by me on 03/21/2024. I have reviewed the chart and agree that the record accurately reflects my personal performance of the history, physical exam, assessment and plan. I have also personally directed, reviewed, and agree with the discharge instructions.

## 2024-03-25 RX ORDER — NYSTATIN AND TRIAMCINOLONE ACETONIDE 100000; 1 MG/G; MG/G
100000-0.1 CREAM TOPICAL
Qty: 30 | Refills: 0 | Status: ACTIVE | COMMUNITY
Start: 2022-06-01 | End: 1900-01-01

## 2024-03-27 NOTE — H&P PST ADULT - VISION (WITH CORRECTIVE LENSES IF THE PATIENT USUALLY WEARS THEM):
Hpi Title: Evaluation of Skin Lesions Normal vision: sees adequately in most situations; can see medication labels, newsprint loss of vision left eye/Partially impaired: cannot see medication labels or newsprint, but can see obstacles in path, and the surrounding layout; can count fingers at arm's length

## 2024-03-31 RX ORDER — TAMSULOSIN HYDROCHLORIDE 0.4 MG/1
0.4 CAPSULE ORAL
Qty: 90 | Refills: 3 | Status: ACTIVE | COMMUNITY
Start: 2022-04-13 | End: 1900-01-01

## 2024-04-02 ENCOUNTER — OUTPATIENT (OUTPATIENT)
Dept: OUTPATIENT SERVICES | Facility: HOSPITAL | Age: 79
LOS: 1 days | End: 2024-04-02
Payer: MEDICARE

## 2024-04-02 VITALS
HEIGHT: 68 IN | HEART RATE: 97 BPM | RESPIRATION RATE: 16 BRPM | WEIGHT: 162.92 LBS | TEMPERATURE: 98 F | DIASTOLIC BLOOD PRESSURE: 71 MMHG | OXYGEN SATURATION: 99 % | SYSTOLIC BLOOD PRESSURE: 102 MMHG

## 2024-04-02 DIAGNOSIS — E11.9 TYPE 2 DIABETES MELLITUS WITHOUT COMPLICATIONS: ICD-10-CM

## 2024-04-02 DIAGNOSIS — Z98.89 OTHER SPECIFIED POSTPROCEDURAL STATES: Chronic | ICD-10-CM

## 2024-04-02 DIAGNOSIS — Z92.241 PERSONAL HISTORY OF SYSTEMIC STEROID THERAPY: Chronic | ICD-10-CM

## 2024-04-02 DIAGNOSIS — Z98.890 OTHER SPECIFIED POSTPROCEDURAL STATES: Chronic | ICD-10-CM

## 2024-04-02 DIAGNOSIS — I71.9 AORTIC ANEURYSM OF UNSPECIFIED SITE, WITHOUT RUPTURE: ICD-10-CM

## 2024-04-02 DIAGNOSIS — I48.91 UNSPECIFIED ATRIAL FIBRILLATION: ICD-10-CM

## 2024-04-02 DIAGNOSIS — C67.9 MALIGNANT NEOPLASM OF BLADDER, UNSPECIFIED: ICD-10-CM

## 2024-04-02 DIAGNOSIS — D49.4 NEOPLASM OF UNSPECIFIED BEHAVIOR OF BLADDER: ICD-10-CM

## 2024-04-02 DIAGNOSIS — J45.20 MILD INTERMITTENT ASTHMA, UNCOMPLICATED: ICD-10-CM

## 2024-04-02 DIAGNOSIS — Z90.49 ACQUIRED ABSENCE OF OTHER SPECIFIED PARTS OF DIGESTIVE TRACT: Chronic | ICD-10-CM

## 2024-04-02 LAB
A1C WITH ESTIMATED AVERAGE GLUCOSE RESULT: 5.7 % — HIGH (ref 4–5.6)
ANION GAP SERPL CALC-SCNC: 10 MMOL/L — SIGNIFICANT CHANGE UP (ref 5–17)
BUN SERPL-MCNC: 18 MG/DL — SIGNIFICANT CHANGE UP (ref 7–23)
CALCIUM SERPL-MCNC: 9.6 MG/DL — SIGNIFICANT CHANGE UP (ref 8.4–10.5)
CHLORIDE SERPL-SCNC: 105 MMOL/L — SIGNIFICANT CHANGE UP (ref 96–108)
CO2 SERPL-SCNC: 25 MMOL/L — SIGNIFICANT CHANGE UP (ref 22–31)
CREAT SERPL-MCNC: 0.98 MG/DL — SIGNIFICANT CHANGE UP (ref 0.5–1.3)
EGFR: 78 ML/MIN/1.73M2 — SIGNIFICANT CHANGE UP
ESTIMATED AVERAGE GLUCOSE: 117 MG/DL — HIGH (ref 68–114)
GLUCOSE SERPL-MCNC: 111 MG/DL — HIGH (ref 70–99)
HCT VFR BLD CALC: 46.1 % — SIGNIFICANT CHANGE UP (ref 39–50)
HGB BLD-MCNC: 15.3 G/DL — SIGNIFICANT CHANGE UP (ref 13–17)
MCHC RBC-ENTMCNC: 33.2 GM/DL — SIGNIFICANT CHANGE UP (ref 32–36)
MCHC RBC-ENTMCNC: 34.3 PG — HIGH (ref 27–34)
MCV RBC AUTO: 103.4 FL — HIGH (ref 80–100)
NRBC # BLD: 0 /100 WBCS — SIGNIFICANT CHANGE UP (ref 0–0)
PLATELET # BLD AUTO: 185 K/UL — SIGNIFICANT CHANGE UP (ref 150–400)
POTASSIUM SERPL-MCNC: 4.6 MMOL/L — SIGNIFICANT CHANGE UP (ref 3.5–5.3)
POTASSIUM SERPL-SCNC: 4.6 MMOL/L — SIGNIFICANT CHANGE UP (ref 3.5–5.3)
RBC # BLD: 4.46 M/UL — SIGNIFICANT CHANGE UP (ref 4.2–5.8)
RBC # FLD: 12.5 % — SIGNIFICANT CHANGE UP (ref 10.3–14.5)
SODIUM SERPL-SCNC: 140 MMOL/L — SIGNIFICANT CHANGE UP (ref 135–145)
WBC # BLD: 6.28 K/UL — SIGNIFICANT CHANGE UP (ref 3.8–10.5)
WBC # FLD AUTO: 6.28 K/UL — SIGNIFICANT CHANGE UP (ref 3.8–10.5)

## 2024-04-02 PROCEDURE — 87086 URINE CULTURE/COLONY COUNT: CPT

## 2024-04-02 PROCEDURE — G0463: CPT

## 2024-04-02 PROCEDURE — 80048 BASIC METABOLIC PNL TOTAL CA: CPT

## 2024-04-02 PROCEDURE — 85027 COMPLETE CBC AUTOMATED: CPT

## 2024-04-02 PROCEDURE — 83036 HEMOGLOBIN GLYCOSYLATED A1C: CPT

## 2024-04-02 RX ORDER — SODIUM CHLORIDE 9 MG/ML
1000 INJECTION, SOLUTION INTRAVENOUS
Refills: 0 | Status: DISCONTINUED | OUTPATIENT
Start: 2024-04-23 | End: 2024-05-07

## 2024-04-02 NOTE — H&P PST ADULT - PRIMARY CARE PROVIDER
Dr. Shar Burks # [phone] now see PCP/ Cardiologist: Dr. Sylvia Barrett # [phone] preop eval 2/29/2024 on chart ( next visit 4/16/2024 ) , pulm Dr. Loera (  pre op visit 3/21/2024 ) note on chart

## 2024-04-02 NOTE — H&P PST ADULT - ASSESSMENT
Airway : no airway abnormalities , denies prior anesthesia complications   Mallampati : I  Denies loose teeth , Denies dentures     Roshan abrasion risk : Denies

## 2024-04-02 NOTE — H&P PST ADULT - OTHER CARE PROVIDERS
Cardiologist: Dr. Sylvia Barrett # prior PCP Dr. Shar Burks # [phone] ( no longer follow, last visit a year ago )

## 2024-04-02 NOTE — H&P PST ADULT - REASON FOR ADMISSION
"I get a cystoscopy every six months." "I get a cystoscopy every six months."  history of bladder cancer

## 2024-04-02 NOTE — H&P PST ADULT - PROBLEM SELECTOR PLAN 1
routine cystoscopy, bladder biopsy and fulguration   PST instructions provided, patient verbalized understanding   CBc, BMP, HgA1C , urine cx collected and sent   cardiology and pulm eval on chart

## 2024-04-02 NOTE — H&P PST ADULT - PROBLEM SELECTOR PLAN 2
Hold Eliquis 2 days prior procedure ( patient was instructed by cardiologist )   Last dose 4/20/2024 pm

## 2024-04-02 NOTE — H&P PST ADULT - HISTORY OF PRESENT ILLNESS
77 year old male with h/o bladder tumor s/p resection and intravesicular gemcitabine in 2020, presents for preop testing for scheduled routine every six months follow up cystoscopy. He denies any hematuria, no dysuria, no frequency/ urgency. His medical history significant for chronic Afib on Eliquis, unsuccessful cardiac ablation for Afib, thoracic aortic aneurysm (4.2 cm), colon resection for malignancy in 2020, bronchiectasis, dyslipidemia, T2DM, hepatitis-C (treated in the past), peripheral neuropathy, back pain, left eye blindness (reconstruction 2020), [place], foot pain (fell off ladder many years ago), GERD.   78  year old male with h/o bladder cancer s/p resection and intravesicular gemcitabine in 2020, presents for preop testing for scheduled routine every six months follow up cystoscopy, bladder biopsy and fulguration on 4/23/2024.  Patient reports last procedure was done 10/10/2023 with Dr. Ciacedo. He denies any hematuria, no dysuria, no frequency/ urgency. His medical history significant for chronic Afib on Eliquis, unsuccessful cardiac ablation for Afib, thoracic aortic aneurysm ( annual CT 7/24/2023 -- 4.3 cm), colon resection for malignancy in 2020, bronchiectasis, dyslipidemia, T2DM, hepatitis-C (treated in the past), peripheral neuropathy, back pain, left eye blindness/prosthesis  (reconstruction 2020), [place], foot pain (fell off ladder many years ago), GERD.   78  year old male with h/o bladder cancer s/p resection and intravesicular gemcitabine in 2020, presents for preop testing for scheduled routine every six months follow up cystoscopy, bladder biopsy and fulguration on 4/23/2024.  Patient reports last procedure was done 10/10/2023 with Dr. Caicedo. He denies any hematuria, no dysuria, no frequency/ urgency. His medical history significant for chronic Afib on Eliquis, unsuccessful cardiac ablation for Afib, thoracic aortic aneurysm ( annual CT 7/24/2023 -- 4.3 cm), colon resection for malignancy in 2020, bronchiectasis, dyslipidemia, T2DM, hepatitis-C (treated in the past), peripheral neuropathy, back pain, left eye blindness/prosthesis  (reconstruction 2020), [place], foot pain (fell off ladder many years ago), GERD. Denies fever, chills, no acute complaints. Presents in hospital wheelchair due to back pain, accompanied by grandson Andreas.

## 2024-04-02 NOTE — H&P PST ADULT - NSANTHOSAYNRD_GEN_A_CORE
was tested , denies TONY/No. TONY screening performed.  STOP BANG Legend: 0-2 = LOW Risk; 3-4 = INTERMEDIATE Risk; 5-8 = HIGH Risk

## 2024-04-02 NOTE — H&P PST ADULT - NSICDXPASTMEDICALHX_GEN_ALL_CORE_FT
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 follow up after an year, last visit 11/2022    Chronic back pain -on oxycodone prn and tylenol    Diabetes mellitus, type 2     Epistaxis hospitalized 3/2021- resolved    Hepatitis C Pt was treated 7 years ago    History of peripheral neuropathy     History of sciatica leg pains    LPRD (laryngopharyngeal reflux disease)     Lumbar disc disease     Malignant neoplasm of colon, unspecified part of colon s/p microsurgery at [facility]  no chemo/XRT in remission since 2012     PAST MEDICAL HISTORY:  Afib -on Eliquis/ s/p HENRRY/ cardioversion on 3/2021, Ablation 10/2021    Aortic aneurysm TAA 4.2cm--- last CTchest May 2022,    Bladder tumor s/p bladder chemo    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 follow up after an year, last visit 11/2022    Chronic back pain -on oxycodone prn and tylenol    Cigar smoker     Diabetes mellitus, type 2     Epistaxis hospitalized 3/2021- resolved    Hepatitis C Pt was treated 7 years ago    History of peripheral neuropathy     History of sciatica leg pains    LPRD (laryngopharyngeal reflux disease)     Lumbar disc disease     Malignant neoplasm of colon, unspecified part of colon s/p microsurgery at [facility]  no chemo/XRT in remission since 2012    Mild intermittent stable asthma

## 2024-04-02 NOTE — H&P PST ADULT - FUNCTIONAL STATUS
DASI 4.30 due to low back pain able to walk for 7 minutes only, able to climb 1 flight at home without symptoms. Independent/4-10 METS

## 2024-04-03 LAB
CULTURE RESULTS: SIGNIFICANT CHANGE UP
SPECIMEN SOURCE: SIGNIFICANT CHANGE UP

## 2024-04-16 ENCOUNTER — NON-APPOINTMENT (OUTPATIENT)
Age: 79
End: 2024-04-16

## 2024-04-16 ENCOUNTER — APPOINTMENT (OUTPATIENT)
Dept: CARDIOLOGY | Facility: CLINIC | Age: 79
End: 2024-04-16
Payer: MEDICARE

## 2024-04-16 VITALS
HEIGHT: 66 IN | BODY MASS INDEX: 27.32 KG/M2 | OXYGEN SATURATION: 98 % | TEMPERATURE: 98.4 F | WEIGHT: 170 LBS | DIASTOLIC BLOOD PRESSURE: 76 MMHG | HEART RATE: 92 BPM | SYSTOLIC BLOOD PRESSURE: 98 MMHG

## 2024-04-16 DIAGNOSIS — Z01.818 ENCOUNTER FOR OTHER PREPROCEDURAL EXAMINATION: ICD-10-CM

## 2024-04-16 PROBLEM — J45.20 MILD INTERMITTENT ASTHMA, UNCOMPLICATED: Chronic | Status: ACTIVE | Noted: 2024-04-02

## 2024-04-16 PROBLEM — F17.290 NICOTINE DEPENDENCE, OTHER TOBACCO PRODUCT, UNCOMPLICATED: Chronic | Status: ACTIVE | Noted: 2024-04-02

## 2024-04-16 PROCEDURE — 99214 OFFICE O/P EST MOD 30 MIN: CPT

## 2024-04-16 PROCEDURE — 93000 ELECTROCARDIOGRAM COMPLETE: CPT | Mod: NC

## 2024-04-16 PROCEDURE — G2211 COMPLEX E/M VISIT ADD ON: CPT

## 2024-04-16 NOTE — PHYSICAL EXAM
[Well Developed] : well developed [Well Nourished] : well nourished [No Acute Distress] : no acute distress [Normal Conjunctiva] : normal conjunctiva [Normal Venous Pressure] : normal venous pressure [No Carotid Bruit] : no carotid bruit [Clear Lung Fields] : clear lung fields [Good Air Entry] : good air entry [No Respiratory Distress] : no respiratory distress  [Soft] : abdomen soft [Non Tender] : non-tender [No Masses/organomegaly] : no masses/organomegaly [Normal Bowel Sounds] : normal bowel sounds [Normal Gait] : normal gait [No Edema] : no edema [No Cyanosis] : no cyanosis [No Clubbing] : no clubbing [No Varicosities] : no varicosities [No Rash] : no rash [No Skin Lesions] : no skin lesions [Moves all extremities] : moves all extremities [No Focal Deficits] : no focal deficits [Normal Speech] : normal speech [Alert and Oriented] : alert and oriented [Normal memory] : normal memory [de-identified] : Regular rate and rhythm, NL S1, S2, non-displaced PMI, chest non-tender; no rubs,heaves  or gallops a  Grade 2/6 systolic murmur noted at the LSB

## 2024-04-16 NOTE — HISTORY OF PRESENT ILLNESS
[FreeTextEntry1] : TAJ HUBBARD  is a 78 year old M w/ pmhx of pmhx of, Afib, pre DM , thoracic aortic aneurysm (4.2cm), bladder CA, and colon CA, Hep C  who presents today for routine follow up. The patient denies fever, chills, sore throat, loss of taste or smell, muscle aches weight loss, malaise, rash, recent travel, insect bites, alteration bowel habits, headaches, weakness, abdominal  pain, bloating, changes in urination, visual disturbances, shortness of breath, chest pain, dizziness, palpitations.   The patient is also here for follow-up of atrial fibrillation and currently they feel well with an occasional episode of palpitations.  The patient states they are reliably taking the anticoagulation medicine and are not having any signs of bleeding they deny any dizziness headaches nosebleeds or black tarry stools .He stopped his eliquis .  The patient is also here for f/u of pre-diabetess  on prior blood test  the patient is trying to follow a low carb diet and avoid simple sugars. they deny excessive thirst or urination and any blurred vision.  pt is here for follow up of dilated aorta they have been taking medication as prescribed  and  blood pressure have been observed in the a reasonable range, their are no reports of worrisome symptoms, such as tearing or "knife like' back pain, chest pain or syncope.  I was asked to see this delightful patient today for Pre-op Evaluation  prior to  __bladder bx __  with   Dr. Pena [phone].  The patient denies fever, cough, wheezing, sputum production, hemoptysis, dyspnea, congestion, diarrhea, constipation, nausea, vomiting, bright red blood per rectum, melena, angina, chest pain, palpitations, diaphoresis, PND, incontinence, frequency, urgency, dysuria, edema, joint pain, headache, weakness, numbness and dizziness. The date of the planned procedure is: _4/23/2024______

## 2024-04-16 NOTE — REVIEW OF SYSTEMS
Spoke with Griselda, PT. Per Griselda patient has some pitting edema in his legs and arrived at his appointment not wearing his compression stockings. Griselda reviewed signs and symptoms of a blood clot with patient and reiterated the importance of elevating the leg. Reports patient has no pain in the leg at this time. RN updated MD.    [Negative] : Heme/Lymph

## 2024-04-18 ENCOUNTER — APPOINTMENT (OUTPATIENT)
Dept: CARDIOLOGY | Facility: CLINIC | Age: 79
End: 2024-04-18
Payer: MEDICARE

## 2024-04-18 PROCEDURE — A9500: CPT

## 2024-04-18 PROCEDURE — 93015 CV STRESS TEST SUPVJ I&R: CPT

## 2024-04-18 PROCEDURE — 78452 HT MUSCLE IMAGE SPECT MULT: CPT

## 2024-04-18 RX ORDER — REGADENOSON 0.08 MG/ML
0.4 INJECTION, SOLUTION INTRAVENOUS
Qty: 1 | Refills: 0 | Status: COMPLETED | OUTPATIENT
Start: 2024-04-18

## 2024-04-18 RX ADMIN — REGADENOSON 4 MG/5ML: 0.08 INJECTION, SOLUTION INTRAVENOUS at 00:00

## 2024-04-22 ENCOUNTER — TRANSCRIPTION ENCOUNTER (OUTPATIENT)
Age: 79
End: 2024-04-22

## 2024-04-23 ENCOUNTER — OUTPATIENT (OUTPATIENT)
Dept: OUTPATIENT SERVICES | Facility: HOSPITAL | Age: 79
LOS: 1 days | End: 2024-04-23
Payer: MEDICARE

## 2024-04-23 ENCOUNTER — APPOINTMENT (OUTPATIENT)
Dept: UROLOGY | Facility: HOSPITAL | Age: 79
End: 2024-04-23

## 2024-04-23 ENCOUNTER — RESULT REVIEW (OUTPATIENT)
Age: 79
End: 2024-04-23

## 2024-04-23 ENCOUNTER — TRANSCRIPTION ENCOUNTER (OUTPATIENT)
Age: 79
End: 2024-04-23

## 2024-04-23 VITALS
HEART RATE: 90 BPM | SYSTOLIC BLOOD PRESSURE: 111 MMHG | RESPIRATION RATE: 18 BRPM | DIASTOLIC BLOOD PRESSURE: 54 MMHG | OXYGEN SATURATION: 95 % | TEMPERATURE: 97 F

## 2024-04-23 VITALS
RESPIRATION RATE: 16 BRPM | TEMPERATURE: 98 F | OXYGEN SATURATION: 99 % | SYSTOLIC BLOOD PRESSURE: 102 MMHG | DIASTOLIC BLOOD PRESSURE: 71 MMHG | HEART RATE: 97 BPM | HEIGHT: 68 IN | WEIGHT: 162.92 LBS

## 2024-04-23 DIAGNOSIS — C67.9 MALIGNANT NEOPLASM OF BLADDER, UNSPECIFIED: ICD-10-CM

## 2024-04-23 DIAGNOSIS — Z90.49 ACQUIRED ABSENCE OF OTHER SPECIFIED PARTS OF DIGESTIVE TRACT: Chronic | ICD-10-CM

## 2024-04-23 DIAGNOSIS — Z98.89 OTHER SPECIFIED POSTPROCEDURAL STATES: Chronic | ICD-10-CM

## 2024-04-23 DIAGNOSIS — Z98.890 OTHER SPECIFIED POSTPROCEDURAL STATES: Chronic | ICD-10-CM

## 2024-04-23 DIAGNOSIS — Z92.241 PERSONAL HISTORY OF SYSTEMIC STEROID THERAPY: Chronic | ICD-10-CM

## 2024-04-23 LAB — GLUCOSE BLDC GLUCOMTR-MCNC: 92 MG/DL — SIGNIFICANT CHANGE UP (ref 70–99)

## 2024-04-23 PROCEDURE — 88305 TISSUE EXAM BY PATHOLOGIST: CPT | Mod: 26

## 2024-04-23 PROCEDURE — 52234 CYSTOSCOPY AND TREATMENT: CPT

## 2024-04-23 PROCEDURE — 88305 TISSUE EXAM BY PATHOLOGIST: CPT

## 2024-04-23 PROCEDURE — 82962 GLUCOSE BLOOD TEST: CPT

## 2024-04-23 PROCEDURE — 88112 CYTOPATH CELL ENHANCE TECH: CPT | Mod: 26

## 2024-04-23 PROCEDURE — 88112 CYTOPATH CELL ENHANCE TECH: CPT

## 2024-04-23 RX ORDER — SODIUM CHLORIDE 9 MG/ML
1000 INJECTION, SOLUTION INTRAVENOUS
Refills: 0 | Status: DISCONTINUED | OUTPATIENT
Start: 2024-04-23 | End: 2024-05-07

## 2024-04-23 RX ORDER — ACETAMINOPHEN 500 MG
2 TABLET ORAL
Qty: 0 | Refills: 0 | DISCHARGE

## 2024-04-23 RX ORDER — MULTIVIT-MIN/FERROUS GLUCONATE 9 MG/15 ML
1 LIQUID (ML) ORAL
Qty: 0 | Refills: 0 | DISCHARGE

## 2024-04-23 RX ORDER — METFORMIN HYDROCHLORIDE 850 MG/1
1 TABLET ORAL
Qty: 0 | Refills: 0 | DISCHARGE

## 2024-04-23 RX ORDER — OXYCODONE HYDROCHLORIDE 5 MG/1
1 TABLET ORAL
Qty: 0 | Refills: 0 | DISCHARGE

## 2024-04-23 RX ORDER — TAMSULOSIN HYDROCHLORIDE 0.4 MG/1
1 CAPSULE ORAL
Qty: 0 | Refills: 0 | DISCHARGE

## 2024-04-23 RX ORDER — APIXABAN 2.5 MG/1
1 TABLET, FILM COATED ORAL
Qty: 0 | Refills: 0 | DISCHARGE

## 2024-04-23 RX ORDER — FOLIC ACID 0.8 MG
1 TABLET ORAL
Qty: 0 | Refills: 0 | DISCHARGE

## 2024-04-23 RX ORDER — CEFAZOLIN SODIUM 1 G
2000 VIAL (EA) INJECTION ONCE
Refills: 0 | Status: COMPLETED | OUTPATIENT
Start: 2024-04-23 | End: 2024-04-23

## 2024-04-23 RX ORDER — FLUTICASONE FUROATE, UMECLIDINIUM BROMIDE AND VILANTEROL TRIFENATATE 200; 62.5; 25 UG/1; UG/1; UG/1
1 POWDER RESPIRATORY (INHALATION)
Refills: 0 | DISCHARGE

## 2024-04-23 RX ORDER — PREGABALIN 225 MG/1
1 CAPSULE ORAL
Qty: 0 | Refills: 0 | DISCHARGE

## 2024-04-23 RX ORDER — PSYLLIUM SEED (WITH DEXTROSE)
0 POWDER (GRAM) ORAL
Qty: 0 | Refills: 0 | DISCHARGE

## 2024-04-23 RX ORDER — FAMOTIDINE 10 MG/ML
1 INJECTION INTRAVENOUS
Qty: 0 | Refills: 0 | DISCHARGE

## 2024-04-23 RX ORDER — LIDOCAINE HCL 20 MG/ML
0.2 VIAL (ML) INJECTION ONCE
Refills: 0 | Status: COMPLETED | OUTPATIENT
Start: 2024-04-23 | End: 2024-04-23

## 2024-04-23 RX ADMIN — SODIUM CHLORIDE 100 MILLILITER(S): 9 INJECTION, SOLUTION INTRAVENOUS at 10:01

## 2024-04-23 NOTE — ASU DISCHARGE PLAN (ADULT/PEDIATRIC) - NS MD DC FALL RISK RISK
For information on Fall & Injury Prevention, visit: [email or web address] OR  [email or web address] OR  https://www.cdc.gov/steadi/patient.html

## 2024-04-23 NOTE — ASU DISCHARGE PLAN (ADULT/PEDIATRIC) - ASU DC SPECIAL INSTRUCTIONSFT
•	General: It is common to have blood in the urine after your procedure. It may be pink or even red; inform your doctor if you have a significant amount of clot in the urine or if you are unable to void at all or if your catheter stops draining. It is not uncommon to have some burning when you urinate, this will gradually improve. With a catheter in place, it is not uncommon to have occasional leakage of urine or blood around the catheter. Please call your urologist if this is excessive and/or the urine is not draining through the catheter into the bag.  •	Bathing: You may shower or bathe. If going home with [place], shower only until catheter is removed.  •	Diet: You may resume your regular diet and regular medication regimen.  •	Pain: You may take Tylenol (acetaminophen) 650-975mg and/or Motrin (ibuprofen) 400-600mg, available over the counter, for pain every 6 hours as needed. Do not exceed 4000 milligrams of Tylenol (acetaminophen) daily. You may alternate these medications such that you take either one every 3 hours.  •	Stool softeners: Do not allow yourself to become constipated as straining will cause bleeding. Take stool softeners (ex. Colace) or a laxative (ex. Senekot, ExLax), available over the counter, if needed.  •	Activity: No heavy lifting or strenuous exercise until you are evaluated at your post-operative appointment. Otherwise, you may return to your usual level of activity.  •	Anticoagulation: Please hold your eliquis until Thursday, you can resume it then.  •	Follow-up: If you did not already schedule your post-operative appointment, please call your urologist to schedule a follow-up appointment.  •	Call your urologist if: You have any bleeding that does not stop, inability to void >8 hours, fever over 100.4 F, chills, persistent nausea/vomiting, or if your pain is not controlled on your discharge pain medications.

## 2024-04-23 NOTE — ASU PATIENT PROFILE, ADULT - NSICDXPASTMEDICALHX_GEN_ALL_CORE_FT
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 follow up after an year, last visit 11/2022    Chronic back pain -on oxycodone prn and tylenol    Cigar smoker     Diabetes mellitus, type 2     Epistaxis hospitalized 3/2021- resolved    Hepatitis C Pt was treated 7 years ago    History of peripheral neuropathy     History of sciatica leg pains    LPRD (laryngopharyngeal reflux disease)     Lumbar disc disease     Malignant neoplasm of colon, unspecified part of colon s/p microsurgery at [facility]  no chemo/XRT in remission since 2012    Mild intermittent stable asthma

## 2024-04-23 NOTE — PRE-ANESTHESIA EVALUATION ADULT - NSANTHPMHFT_GEN_ALL_CORE
mild bronchiectasis-RML  THORACIC AORTIC ANEURYSM 4.2 CM  LAST CIGAR- LAST WEEK  CHRONIC PAIN ON OXYCODONE  AF ON ELIQUIS, LD 4 DAYS AGO  GERD- DENIES  LEFT EYE BLIND

## 2024-04-23 NOTE — ASU DISCHARGE PLAN (ADULT/PEDIATRIC) - NURSING INSTRUCTIONS
Next dose of Tylenol will be on or after __5:45pm_________ ,today/tonight and every 6 hours afterwards for pain management, do not take any Tylenol containing products until this time. Your first dose of Tylenol was given at __11:45am_________. Do not exceed more than 4000mg of Tylenol in one 24 hour setting.

## 2024-04-23 NOTE — PRE-ANESTHESIA EVALUATION ADULT - ANESTHESIA, PREVIOUS REACTION, PROFILE
87 Ventricular Rate BPM  87 Atrial Rate BPM  197 P-R Interval ms  107 QRS Duration ms  355 Q-T Interval ms  427 QTC Calculation(Bazett) ms  22 P Axis degrees  269 R Axis degrees  27 T Axis degrees  Sinus rhythm  Incomplete RBBB and LAFB  Probable right ventricular hypertrophy  Confirmed by Bandar Vitale ([id number]) on 11/22/2019 10:14:31 PM  
none

## 2024-04-23 NOTE — ASU DISCHARGE PLAN (ADULT/PEDIATRIC) - CARE PROVIDER_API CALL
Todd Caicedo  Urology  [address] [place] [address], [facility] [address]  [place] [address]  Phone: [phone]  Fax: [phone]  Follow Up Time: Routine

## 2024-04-23 NOTE — ASU PATIENT PROFILE, ADULT - REASON FOR ADMISSION, PROFILE
bladder biopsy" Erivedge Counseling- I discussed with the patient the risks of Erivedge including but not limited to nausea, vomiting, diarrhea, constipation, weight loss, changes in the sense of taste, decreased appetite, muscle spasms, and hair loss.  The patient verbalized understanding of the proper use and possible adverse effects of Erivedge.  All of the patient's questions and concerns were addressed.

## 2024-04-26 LAB — SURGICAL PATHOLOGY STUDY: SIGNIFICANT CHANGE UP

## 2024-04-29 ENCOUNTER — NON-APPOINTMENT (OUTPATIENT)
Age: 79
End: 2024-04-29

## 2024-05-02 RX ORDER — FAMOTIDINE 40 MG/1
40 TABLET, FILM COATED ORAL
Qty: 90 | Refills: 1 | Status: ACTIVE | COMMUNITY
Start: 2022-11-09 | End: 1900-01-01

## 2024-05-13 NOTE — H&P ADULT - NSHPSOURCEINFORD_GEN_ALL_CORE
Chief complaint:   Chief Complaint   Patient presents with    Physical       Vitals:  Visit Vitals  /70   Pulse 68   Temp 98 °F (36.7 °C)   Ht 5' 6\" (1.676 m)   Wt 67.4 kg (148 lb 8 oz)   LMP 05/07/2024   SpO2 99%   BMI 23.97 kg/m²       HISTORY OF PRESENT ILLNESS     Grace, 23-year-old female, presents to the clinic for annual physical.  She is , working for [employer] fulltime. (Previously with [employer])      Chronic benign mole on the right side of her head that has been there for several years, was evaluated by dermatology 2023.     She states she is coping well and denies any other concerns today.  Pap due 2025  Glucose and cholesterol screening completed 2022, without abnormality        Other significant problems:  There are no problems to display for this patient.      PAST MEDICAL, FAMILY AND SOCIAL HISTORY     Medications:  Current Outpatient Medications   Medication Sig Dispense Refill    Multiple Vitamin (MULTIVITAMIN PO)        No current facility-administered medications for this visit.       Allergies:  ALLERGIES:  No Known Allergies    Past Medical  History/Surgeries:  Past Medical History:   Diagnosis Date    History of early menarche     Intolerance to heat        Past Surgical History:   Procedure Laterality Date    Tonsillectomy and adenoidectomy         Family History:  History reviewed. No pertinent family history.    Social History:  Social History     Tobacco Use    Smoking status: Never    Smokeless tobacco: Never   Substance Use Topics    Alcohol use: Not on file       REVIEW OF SYSTEMS     Review of Systems   All other systems reviewed and are negative.      PHYSICAL EXAM     Physical Exam  Vitals and nursing note reviewed.   Constitutional:       Appearance: Normal appearance. She is normal weight.   HENT:      Head: Normocephalic and atraumatic.      Right Ear: Tympanic membrane, ear canal and external ear normal.      Left Ear: Tympanic  membrane, ear canal and external ear normal.      Nose: Nose normal.      Mouth/Throat:      Mouth: Mucous membranes are moist.      Pharynx: Oropharynx is clear.      Neck: Normal range of motion and neck supple.   Eyes:      Extraocular Movements: Extraocular movements intact.      Conjunctiva/sclera: Conjunctivae normal.      Pupils: Pupils are equal, round, and reactive to light.   Cardiovascular:      Rate and Rhythm: Normal rate and regular rhythm.      Pulses: Normal pulses.      Heart sounds: Normal heart sounds.   Pulmonary:      Effort: Pulmonary effort is normal.      Breath sounds: Normal breath sounds.   Abdominal:      General: Abdomen is flat. Bowel sounds are normal.      Palpations: Abdomen is soft.   Musculoskeletal:         General: Normal range of motion.   Skin:     General: Skin is warm and dry.      Capillary Refill: Capillary refill takes less than 2 seconds.   Neurological:      General: No focal deficit present.      Mental Status: She is alert and oriented to person, place, and time. Mental status is at baseline.   Psychiatric:         Mood and Affect: Mood normal.         Behavior: Behavior normal.         Thought Content: Thought content normal.         Judgment: Judgment normal.      Comments: Depression Screening:    PHQ2/9:  Initial depression screening score:: 0          GDS:0          Mood:  Normal    Interventions:  No intervention needed at this time.           ASSESSMENT/PLAN     1. Encounter for preventative adult health care examination  Age appropriate physical exam, screening, and counseling completed.     Chart(s)/Patient

## 2024-05-15 ENCOUNTER — RX RENEWAL (OUTPATIENT)
Age: 79
End: 2024-05-15

## 2024-05-15 RX ORDER — APIXABAN 5 MG/1
5 TABLET, FILM COATED ORAL
Qty: 180 | Refills: 0 | Status: ACTIVE | COMMUNITY
Start: 2021-11-08 | End: 1900-01-01

## 2024-05-17 ENCOUNTER — RX RENEWAL (OUTPATIENT)
Age: 79
End: 2024-05-17

## 2024-05-17 NOTE — ASU DISCHARGE PLAN (ADULT/PEDIATRIC) - C. MAKE IMPORTANT PERSONAL OR BUSINESS DECISIONS
[de-identified] : wnwd nad [de-identified] : ncat [de-identified] : soft nt nd +Bsx4  Statement Selected

## 2024-05-20 RX ORDER — METOPROLOL SUCCINATE 25 MG/1
25 TABLET, EXTENDED RELEASE ORAL
Qty: 30 | Refills: 0 | Status: ACTIVE | COMMUNITY
Start: 2024-02-29 | End: 1900-01-01

## 2024-06-03 ENCOUNTER — LABORATORY RESULT (OUTPATIENT)
Age: 79
End: 2024-06-03

## 2024-06-03 ENCOUNTER — APPOINTMENT (OUTPATIENT)
Dept: CARDIOLOGY | Facility: CLINIC | Age: 79
End: 2024-06-03
Payer: MEDICARE

## 2024-06-03 VITALS
OXYGEN SATURATION: 99 % | TEMPERATURE: 97.6 F | HEART RATE: 78 BPM | SYSTOLIC BLOOD PRESSURE: 119 MMHG | WEIGHT: 170 LBS | BODY MASS INDEX: 27.32 KG/M2 | HEIGHT: 66 IN | DIASTOLIC BLOOD PRESSURE: 78 MMHG

## 2024-06-03 DIAGNOSIS — I48.91 UNSPECIFIED ATRIAL FIBRILLATION: ICD-10-CM

## 2024-06-03 DIAGNOSIS — E78.5 HYPERLIPIDEMIA, UNSPECIFIED: ICD-10-CM

## 2024-06-03 DIAGNOSIS — Z90.01 ACQUIRED ABSENCE OF EYE: ICD-10-CM

## 2024-06-03 DIAGNOSIS — I71.20 THORACIC AORTIC ANEURYSM, WITHOUT RUPTURE, UNSPECIFIED: ICD-10-CM

## 2024-06-03 DIAGNOSIS — C67.9 MALIGNANT NEOPLASM OF BLADDER, UNSPECIFIED: ICD-10-CM

## 2024-06-03 DIAGNOSIS — I25.10 ATHEROSCLEROTIC HEART DISEASE OF NATIVE CORONARY ARTERY W/OUT ANGINA PECTORIS: ICD-10-CM

## 2024-06-03 DIAGNOSIS — Z79.01 LONG TERM (CURRENT) USE OF ANTICOAGULANTS: ICD-10-CM

## 2024-06-03 PROCEDURE — G2211 COMPLEX E/M VISIT ADD ON: CPT

## 2024-06-03 PROCEDURE — 99214 OFFICE O/P EST MOD 30 MIN: CPT

## 2024-06-03 NOTE — HISTORY OF PRESENT ILLNESS
[FreeTextEntry1] : TAJ HUBBARD is a 78 year old M w/ pmhx of pmhx of, Afib, pre DM , thoracic aortic aneurysm (4.2cm), bladder CA, and colon CA, Hep C who presents today for routine follow up. The patient denies fever, chills, sore throat, loss of taste or smell, muscle aches weight loss, malaise, rash, recent travel, insect bites, alteration bowel habits, headaches, weakness, abdominal pain, bloating, changes in urination, visual disturbances, shortness of breath, chest pain, dizziness, palpitations.  The patient is also here for follow-up of atrial fibrillation and currently they feel well with an occasional episode of palpitations. The patient states they are reliably taking the anticoagulation medicine and are not having any signs of bleeding they deny any dizziness headaches nosebleeds or black tarry stools .He stopped his eliquis .  The patient is also here for f/u of pre-diabetess on prior blood test the patient is trying to follow a low carb diet and avoid simple sugars. they deny excessive thirst or urination and any blurred vision.  pt is here for follow up of dilated aorta they have been taking medication as prescribed and blood pressure have been observed in the a reasonable range, their are no reports of worrisome symptoms, such as tearing or "knife like' back pain, chest pain or syncope.

## 2024-06-04 LAB
ALBUMIN SERPL ELPH-MCNC: 4.1 G/DL
ALP BLD-CCNC: 66 U/L
ALT SERPL-CCNC: 11 U/L
ANION GAP SERPL CALC-SCNC: 13 MMOL/L
APO B SERPL-MCNC: 67 MG/DL
AST SERPL-CCNC: 17 U/L
BASOPHILS # BLD AUTO: 0.07 K/UL
BASOPHILS NFR BLD AUTO: 0.9 %
BILIRUB DIRECT SERPL-MCNC: 0.2 MG/DL
BILIRUB INDIRECT SERPL-MCNC: 0.3 MG/DL
BILIRUB SERPL-MCNC: 0.5 MG/DL
BUN SERPL-MCNC: 20 MG/DL
CALCIUM SERPL-MCNC: 9.3 MG/DL
CHLORIDE SERPL-SCNC: 106 MMOL/L
CHOLEST SERPL-MCNC: 142 MG/DL
CK SERPL-CCNC: 38 U/L
CO2 SERPL-SCNC: 21 MMOL/L
CREAT SERPL-MCNC: 1.32 MG/DL
CRP SERPL HS-MCNC: 3.69 MG/L
EGFR: 55 ML/MIN/1.73M2
EOSINOPHIL # BLD AUTO: 0.32 K/UL
EOSINOPHIL NFR BLD AUTO: 4.2 %
ESTIMATED AVERAGE GLUCOSE: 114 MG/DL
FOLATE SERPL-MCNC: >20 NG/ML
GLUCOSE SERPL-MCNC: 82 MG/DL
HBA1C MFR BLD HPLC: 5.6 %
HCT VFR BLD CALC: 46.1 %
HDLC SERPL-MCNC: 47 MG/DL
HGB BLD-MCNC: 15 G/DL
LDLC SERPL CALC-MCNC: 76 MG/DL
LDLC SERPL DIRECT ASSAY-MCNC: 77 MG/DL
LYMPHOCYTES # BLD AUTO: 3.02 K/UL
LYMPHOCYTES NFR BLD AUTO: 40.3 %
MAN DIFF?: NORMAL
MCHC RBC-ENTMCNC: 32.5 GM/DL
MCHC RBC-ENTMCNC: 34.9 PG
MCV RBC AUTO: 107.2 FL
MONOCYTES # BLD AUTO: 0.44 K/UL
MONOCYTES NFR BLD AUTO: 5.9 %
NEUTROPHILS # BLD AUTO: 3.65 K/UL
NEUTROPHILS NFR BLD AUTO: 48.7 %
NONHDLC SERPL-MCNC: 94 MG/DL
PLATELET # BLD AUTO: 188 K/UL
POTASSIUM SERPL-SCNC: 5 MMOL/L
PROT SERPL-MCNC: 7.4 G/DL
RBC # BLD: 4.3 M/UL
RBC # FLD: 13.2 %
SODIUM SERPL-SCNC: 140 MMOL/L
TRIGL SERPL-MCNC: 96 MG/DL
VIT B12 SERPL-MCNC: >2000 PG/ML
WBC # FLD AUTO: 7.5 K/UL

## 2024-06-09 DIAGNOSIS — R71.8 OTHER ABNORMALITY OF RED BLOOD CELLS: ICD-10-CM

## 2024-06-11 ENCOUNTER — OUTPATIENT (OUTPATIENT)
Dept: OUTPATIENT SERVICES | Facility: HOSPITAL | Age: 79
LOS: 1 days | Discharge: HOME | End: 2024-06-11

## 2024-06-11 DIAGNOSIS — Z90.49 ACQUIRED ABSENCE OF OTHER SPECIFIED PARTS OF DIGESTIVE TRACT: Chronic | ICD-10-CM

## 2024-06-11 DIAGNOSIS — Z98.89 OTHER SPECIFIED POSTPROCEDURAL STATES: Chronic | ICD-10-CM

## 2024-06-11 DIAGNOSIS — Z98.890 OTHER SPECIFIED POSTPROCEDURAL STATES: Chronic | ICD-10-CM

## 2024-06-11 DIAGNOSIS — R77.9 ABNORMALITY OF PLASMA PROTEIN, UNSPECIFIED: ICD-10-CM

## 2024-06-11 DIAGNOSIS — Z92.241 PERSONAL HISTORY OF SYSTEMIC STEROID THERAPY: Chronic | ICD-10-CM

## 2024-06-12 ENCOUNTER — NON-APPOINTMENT (OUTPATIENT)
Age: 79
End: 2024-06-12

## 2024-06-13 ENCOUNTER — RESULT REVIEW (OUTPATIENT)
Age: 79
End: 2024-06-13

## 2024-06-13 ENCOUNTER — APPOINTMENT (OUTPATIENT)
Dept: HEMATOLOGY ONCOLOGY | Facility: CLINIC | Age: 79
End: 2024-06-13
Payer: MEDICARE

## 2024-06-13 VITALS
RESPIRATION RATE: 16 BRPM | SYSTOLIC BLOOD PRESSURE: 134 MMHG | WEIGHT: 169.95 LBS | TEMPERATURE: 97.6 F | BODY MASS INDEX: 27.43 KG/M2 | HEART RATE: 82 BPM | DIASTOLIC BLOOD PRESSURE: 86 MMHG | OXYGEN SATURATION: 98 %

## 2024-06-13 DIAGNOSIS — D75.89 OTHER SPECIFIED DISEASES OF BLOOD AND BLOOD-FORMING ORGANS: ICD-10-CM

## 2024-06-13 LAB
BASOPHILS # BLD AUTO: 0.05 K/UL — SIGNIFICANT CHANGE UP (ref 0–0.2)
BASOPHILS NFR BLD AUTO: 0.5 % — SIGNIFICANT CHANGE UP (ref 0–2)
EOSINOPHIL # BLD AUTO: 0.19 K/UL — SIGNIFICANT CHANGE UP (ref 0–0.5)
EOSINOPHIL NFR BLD AUTO: 2 % — SIGNIFICANT CHANGE UP (ref 0–6)
HCT VFR BLD CALC: 48.3 % — SIGNIFICANT CHANGE UP (ref 39–50)
HGB BLD-MCNC: 16.5 G/DL — SIGNIFICANT CHANGE UP (ref 13–17)
IMM GRANULOCYTES NFR BLD AUTO: 0.3 % — SIGNIFICANT CHANGE UP (ref 0–0.9)
LYMPHOCYTES # BLD AUTO: 2.71 K/UL — SIGNIFICANT CHANGE UP (ref 1–3.3)
LYMPHOCYTES # BLD AUTO: 29 % — SIGNIFICANT CHANGE UP (ref 13–44)
MCHC RBC-ENTMCNC: 34.2 G/DL — SIGNIFICANT CHANGE UP (ref 32–36)
MCHC RBC-ENTMCNC: 35.3 PG — HIGH (ref 27–34)
MCV RBC AUTO: 103.4 FL — HIGH (ref 80–100)
MONOCYTES # BLD AUTO: 0.72 K/UL — SIGNIFICANT CHANGE UP (ref 0–0.9)
MONOCYTES NFR BLD AUTO: 7.7 % — SIGNIFICANT CHANGE UP (ref 2–14)
NEUTROPHILS # BLD AUTO: 5.65 K/UL — SIGNIFICANT CHANGE UP (ref 1.8–7.4)
NEUTROPHILS NFR BLD AUTO: 60.5 % — SIGNIFICANT CHANGE UP (ref 43–77)
NRBC # BLD: 0 /100 WBCS — SIGNIFICANT CHANGE UP (ref 0–0)
PLATELET # BLD AUTO: 192 K/UL — SIGNIFICANT CHANGE UP (ref 150–400)
RBC # BLD: 4.67 M/UL — SIGNIFICANT CHANGE UP (ref 4.2–5.8)
RBC # FLD: 12.4 % — SIGNIFICANT CHANGE UP (ref 10.3–14.5)
RETICS #: 83.6 K/UL — SIGNIFICANT CHANGE UP (ref 25–125)
RETICS/RBC NFR: 1.8 % — SIGNIFICANT CHANGE UP (ref 0.5–2.5)
WBC # BLD: 9.35 K/UL — SIGNIFICANT CHANGE UP (ref 3.8–10.5)
WBC # FLD AUTO: 9.35 K/UL — SIGNIFICANT CHANGE UP (ref 3.8–10.5)

## 2024-06-13 PROCEDURE — 99213 OFFICE O/P EST LOW 20 MIN: CPT

## 2024-06-13 RX ORDER — AMOXICILLIN AND CLAVULANATE POTASSIUM 875; 125 MG/1; MG/1
875-125 TABLET, COATED ORAL
Qty: 14 | Refills: 0 | Status: DISCONTINUED | COMMUNITY
Start: 2022-12-12 | End: 2024-06-13

## 2024-06-13 RX ORDER — FLUTICASONE FUROATE, UMECLIDINIUM BROMIDE AND VILANTEROL TRIFENATATE 200; 62.5; 25 UG/1; UG/1; UG/1
200-62.5-25 POWDER RESPIRATORY (INHALATION)
Qty: 3 | Refills: 1 | Status: DISCONTINUED | COMMUNITY
Start: 2022-11-09 | End: 2024-06-13

## 2024-06-13 RX ORDER — FLUTICASONE FUROATE, UMECLIDINIUM BROMIDE AND VILANTEROL TRIFENATATE 200; 62.5; 25 UG/1; UG/1; UG/1
200-62.5-25 POWDER RESPIRATORY (INHALATION)
Qty: 3 | Refills: 0 | Status: DISCONTINUED | COMMUNITY
Start: 2022-05-04 | End: 2024-06-13

## 2024-06-13 RX ORDER — DEXTROMETHORPHAN HYDROBROMIDE, GUAIFENESIN 30; 600 MG/1; MG/1
30-600 TABLET, EXTENDED RELEASE ORAL
Refills: 0 | Status: DISCONTINUED | COMMUNITY
Start: 2022-01-10 | End: 2024-06-13

## 2024-06-13 RX ORDER — BLOOD PRESSURE TEST KIT-MEDIUM
KIT MISCELLANEOUS
Qty: 1 | Refills: 0 | Status: DISCONTINUED | COMMUNITY
Start: 2020-10-30 | End: 2024-06-13

## 2024-06-13 RX ORDER — BLOOD-GLUCOSE METER
W/DEVICE KIT MISCELLANEOUS
Qty: 1 | Refills: 0 | Status: DISCONTINUED | COMMUNITY
Start: 2022-05-12 | End: 2024-06-13

## 2024-06-13 RX ORDER — FAMOTIDINE 40 MG/1
40 TABLET, FILM COATED ORAL
Qty: 90 | Refills: 1 | Status: DISCONTINUED | COMMUNITY
Start: 2022-05-04 | End: 2024-06-13

## 2024-06-13 RX ORDER — PANTOPRAZOLE SODIUM 40 MG/10ML
40 INJECTION, POWDER, FOR SOLUTION INTRAVENOUS
Refills: 0 | Status: DISCONTINUED | COMMUNITY
End: 2024-06-13
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
## 2024-06-13 NOTE — PHYSICAL EXAM
[Fully active, able to carry on all pre-disease performance without restriction] : Status 0 - Fully active, able to carry on all pre-disease performance without restriction [Normal] : affect appropriate [de-identified] : s/p L eye reconstruction

## 2024-06-13 NOTE — ASSESSMENT
[FreeTextEntry1] : 79 y/o M with extensive medical history detailed above here for evaluation of elevated total protein and elevated free light chains in the past, although negative on repeat evaluation. Immunoelectrophoresis showed protein expansion across alpha and beta regions, and no evidence of paraproteinemia and serum immunofixation showed no evidence of monoclonal protein. Although kappa and lambda FLCs were elevated, they were only modestly so and with a normal ratio. Sent back for macrocytosis. B12/folate normal and no evidence for hemolysis or thyroid dysfunction. Reassurance provided. No role to further workup at this point.

## 2024-06-14 LAB
ALBUMIN SERPL ELPH-MCNC: 4.5 G/DL
ALP BLD-CCNC: 70 U/L
ALT SERPL-CCNC: 20 U/L
ANION GAP SERPL CALC-SCNC: 18 MMOL/L
AST SERPL-CCNC: 16 U/L
B2 MICROGLOB SERPL-MCNC: 2.3 MG/L
BILIRUB DIRECT SERPL-MCNC: 0.2 MG/DL
BILIRUB SERPL-MCNC: 0.7 MG/DL
BUN SERPL-MCNC: 26 MG/DL
CALCIUM SERPL-MCNC: 9.5 MG/DL
CHLORIDE SERPL-SCNC: 102 MMOL/L
CO2 SERPL-SCNC: 24 MMOL/L
CREAT SERPL-MCNC: 1.1 MG/DL
EGFR: 68 ML/MIN/1.73M2
FERRITIN SERPL-MCNC: 47 NG/ML
GLUCOSE SERPL-MCNC: 108 MG/DL
HAPTOGLOB SERPL-MCNC: 171 MG/DL
HIV1+2 AB SPEC QL IA.RAPID: NONREACTIVE
IRON SATN MFR SERPL: 47 %
IRON SERPL-MCNC: 136 UG/DL
LDH SERPL-CCNC: 152 U/L
POTASSIUM SERPL-SCNC: 4.7 MMOL/L
PROT SERPL-MCNC: 7.6 G/DL
SODIUM SERPL-SCNC: 143 MMOL/L
TIBC SERPL-MCNC: 288 UG/DL
TSH SERPL-ACNC: 1.42 UIU/ML
UIBC SERPL-MCNC: 152 UG/DL

## 2024-06-19 LAB
ALBUMIN MFR SERPL ELPH: 56.8 %
ALBUMIN SERPL-MCNC: 4.3 G/DL
ALBUMIN/GLOB SERPL: 1.3 RATIO
ALPHA1 GLOB MFR SERPL ELPH: 3.7 %
ALPHA1 GLOB SERPL ELPH-MCNC: 0.3 G/DL
ALPHA2 GLOB MFR SERPL ELPH: 12.8 %
ALPHA2 GLOB SERPL ELPH-MCNC: 1 G/DL
B-GLOBULIN MFR SERPL ELPH: 13 %
B-GLOBULIN SERPL ELPH-MCNC: 1 G/DL
CA SERPL QL: NORMAL
DEPRECATED KAPPA LC FREE/LAMBDA SER: 1.39 RATIO
GAMMA GLOB FLD ELPH-MCNC: 1 G/DL
GAMMA GLOB MFR SERPL ELPH: 13.7 %
IGA SER QL IEP: 410 MG/DL
IGG SER QL IEP: 1001 MG/DL
IGM SER QL IEP: 38 MG/DL
INTERPRETATION SERPL IEP-IMP: NORMAL
KAPPA LC CSF-MCNC: 1.93 MG/DL
KAPPA LC SERPL-MCNC: 2.69 MG/DL
M PROTEIN SPEC IFE-MCNC: NORMAL
PROT SERPL-MCNC: 7.6 G/DL
PROT SERPL-MCNC: 7.6 G/DL

## 2024-07-15 ENCOUNTER — APPOINTMENT (OUTPATIENT)
Dept: UROLOGY | Facility: CLINIC | Age: 79
End: 2024-07-15
Payer: MEDICARE

## 2024-07-15 ENCOUNTER — APPOINTMENT (OUTPATIENT)
Dept: CARDIOLOGY | Facility: CLINIC | Age: 79
End: 2024-07-15

## 2024-07-15 DIAGNOSIS — C67.9 MALIGNANT NEOPLASM OF BLADDER, UNSPECIFIED: ICD-10-CM

## 2024-07-15 DIAGNOSIS — N28.1 CYST OF KIDNEY, ACQUIRED: ICD-10-CM

## 2024-07-15 PROCEDURE — 99213 OFFICE O/P EST LOW 20 MIN: CPT

## 2024-07-15 PROCEDURE — G2211 COMPLEX E/M VISIT ADD ON: CPT

## 2024-08-12 DIAGNOSIS — C67.9 MALIGNANT NEOPLASM OF BLADDER, UNSPECIFIED: ICD-10-CM

## 2024-09-03 ENCOUNTER — OUTPATIENT (OUTPATIENT)
Dept: OUTPATIENT SERVICES | Facility: HOSPITAL | Age: 79
LOS: 1 days | End: 2024-09-03
Payer: MEDICARE

## 2024-09-03 VITALS
HEART RATE: 82 BPM | WEIGHT: 167.99 LBS | SYSTOLIC BLOOD PRESSURE: 118 MMHG | OXYGEN SATURATION: 97 % | TEMPERATURE: 98 F | DIASTOLIC BLOOD PRESSURE: 83 MMHG | HEIGHT: 67 IN | RESPIRATION RATE: 16 BRPM

## 2024-09-03 DIAGNOSIS — I71.40 ABDOMINAL AORTIC ANEURYSM, WITHOUT RUPTURE, UNSPECIFIED: ICD-10-CM

## 2024-09-03 DIAGNOSIS — Z98.890 OTHER SPECIFIED POSTPROCEDURAL STATES: Chronic | ICD-10-CM

## 2024-09-03 DIAGNOSIS — E11.9 TYPE 2 DIABETES MELLITUS WITHOUT COMPLICATIONS: ICD-10-CM

## 2024-09-03 DIAGNOSIS — C67.9 MALIGNANT NEOPLASM OF BLADDER, UNSPECIFIED: ICD-10-CM

## 2024-09-03 DIAGNOSIS — Z86.79 PERSONAL HISTORY OF OTHER DISEASES OF THE CIRCULATORY SYSTEM: ICD-10-CM

## 2024-09-03 DIAGNOSIS — Z98.89 OTHER SPECIFIED POSTPROCEDURAL STATES: Chronic | ICD-10-CM

## 2024-09-03 DIAGNOSIS — Z92.241 PERSONAL HISTORY OF SYSTEMIC STEROID THERAPY: Chronic | ICD-10-CM

## 2024-09-03 DIAGNOSIS — I48.91 UNSPECIFIED ATRIAL FIBRILLATION: ICD-10-CM

## 2024-09-03 DIAGNOSIS — Z90.49 ACQUIRED ABSENCE OF OTHER SPECIFIED PARTS OF DIGESTIVE TRACT: Chronic | ICD-10-CM

## 2024-09-03 LAB
A1C WITH ESTIMATED AVERAGE GLUCOSE RESULT: 5.5 % — SIGNIFICANT CHANGE UP (ref 4–5.6)
ANION GAP SERPL CALC-SCNC: 10 MMOL/L — SIGNIFICANT CHANGE UP (ref 5–17)
BUN SERPL-MCNC: 14 MG/DL — SIGNIFICANT CHANGE UP (ref 7–23)
CALCIUM SERPL-MCNC: 9.1 MG/DL — SIGNIFICANT CHANGE UP (ref 8.4–10.5)
CHLORIDE SERPL-SCNC: 107 MMOL/L — SIGNIFICANT CHANGE UP (ref 96–108)
CO2 SERPL-SCNC: 24 MMOL/L — SIGNIFICANT CHANGE UP (ref 22–31)
CREAT SERPL-MCNC: 0.89 MG/DL — SIGNIFICANT CHANGE UP (ref 0.5–1.3)
EGFR: 87 ML/MIN/1.73M2 — SIGNIFICANT CHANGE UP
ESTIMATED AVERAGE GLUCOSE: 111 MG/DL — SIGNIFICANT CHANGE UP (ref 68–114)
GLUCOSE SERPL-MCNC: 88 MG/DL — SIGNIFICANT CHANGE UP (ref 70–99)
HCT VFR BLD CALC: 42.2 % — SIGNIFICANT CHANGE UP (ref 39–50)
HGB BLD-MCNC: 14.4 G/DL — SIGNIFICANT CHANGE UP (ref 13–17)
MCHC RBC-ENTMCNC: 34.1 GM/DL — SIGNIFICANT CHANGE UP (ref 32–36)
MCHC RBC-ENTMCNC: 34.5 PG — HIGH (ref 27–34)
MCV RBC AUTO: 101.2 FL — HIGH (ref 80–100)
NRBC # BLD: 0 /100 WBCS — SIGNIFICANT CHANGE UP (ref 0–0)
PLATELET # BLD AUTO: 194 K/UL — SIGNIFICANT CHANGE UP (ref 150–400)
POTASSIUM SERPL-MCNC: 4.2 MMOL/L — SIGNIFICANT CHANGE UP (ref 3.5–5.3)
POTASSIUM SERPL-SCNC: 4.2 MMOL/L — SIGNIFICANT CHANGE UP (ref 3.5–5.3)
RBC # BLD: 4.17 M/UL — LOW (ref 4.2–5.8)
RBC # FLD: 13.1 % — SIGNIFICANT CHANGE UP (ref 10.3–14.5)
SODIUM SERPL-SCNC: 141 MMOL/L — SIGNIFICANT CHANGE UP (ref 135–145)
WBC # BLD: 6.52 K/UL — SIGNIFICANT CHANGE UP (ref 3.8–10.5)
WBC # FLD AUTO: 6.52 K/UL — SIGNIFICANT CHANGE UP (ref 3.8–10.5)

## 2024-09-03 RX ORDER — SODIUM CHLORIDE IRRIG SOLUTION 0.9 %
1000 SOLUTION, IRRIGATION IRRIGATION
Refills: 0 | Status: DISCONTINUED | OUTPATIENT
Start: 2024-09-24 | End: 2024-10-09

## 2024-09-03 RX ORDER — SODIUM CHLORIDE 0.9 % (FLUSH) 0.9 %
3 SYRINGE (ML) INJECTION EVERY 8 HOURS
Refills: 0 | Status: DISCONTINUED | OUTPATIENT
Start: 2024-09-24 | End: 2024-10-09

## 2024-09-03 NOTE — H&P PST ADULT - PROBLEM SELECTOR PLAN 1
Scheduled for Cystoscopy, Bladder Biopsy and Fulguration  Pre-procedure labs collected. PST instructions provided. Patient verbalized understanding of instructions.

## 2024-09-03 NOTE — H&P PST ADULT - NSICDXPASTMEDICALHX_GEN_ALL_CORE_FT
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 follow up after an year, last visit 11/2022    Chronic back pain -on oxycodone prn and tylenol    Cigar smoker     Diabetes mellitus, type 2     Epistaxis hospitalized 3/2021- resolved    Hepatitis C Pt was treated 7 years ago    History of peripheral neuropathy     History of sciatica leg pains    LPRD (laryngopharyngeal reflux disease)     Lumbar disc disease     Malignant neoplasm of colon, unspecified part of colon s/p microsurgery at [facility]  no chemo/XRT in remission since 2012    Mild intermittent stable asthma

## 2024-09-03 NOTE — H&P PST ADULT - HISTORY OF PRESENT ILLNESS
This is a 79 y/o M with PMHx Left Eye Blindness/Prosthesis, significant for colon cancer (2012) s/p colonoscopic resection, bladder cancer s/p cystoscopic resection and intravesicular gemcitabine in 2020.   Pt with additional hx of Atrial Fibrillation on Eliquis, s/p unsuccessful cardiac ablation, thoracic aortic aneurysm (4.2cm), T2DM, Hepatitis C (treated in the past)    This is a 77 y/o M with PMHx significant for Left Eye Blindness/Prosthesis, colon cancer (2012) s/p colon resection, subsequently discovered bladder cancer s/p cystoscopic resection and intravesicular gemcitabine in 2020. Patient is scheduled for Cystoscopy, Bladder Biopsy and Fulguration with Dr. Caicedo on 09/24/24. Per Chart Review patient undergoes cystoscopies every 6 months for noninvasive urothelial carcinoma.  Pt with additional hx of Atrial Fibrillation on Eliquis, s/p cardioversion/ cardiac ablation (2021, unsuccessful) , thoracic aortic aneurysm (4.2cm), T2DM, Hepatitis C (treated in the past).    This is a 79 y/o M with PMHx significant for Left Eye Blindness/Prosthesis, colon cancer (2012) s/p colon resection, subsequently discovered bladder cancer s/p cystoscopic resection and intravesicular gemcitabine in 2020. Patient is scheduled for Cystoscopy, Bladder Biopsy and Fulguration with Dr. Caicedo on 09/24/24. Per Chart Review patient undergoes cystoscopies every 6 months for noninvasive urothelial carcinoma.  Pt with additional hx of Atrial Fibrillation on Eliquis, s/p cardioversion/ cardiac ablation (2021, unsuccessful) , thoracic aortic aneurysm (4.2cm), T2DM, Hepatitis C (treated in the past).       9/3/2024 urine cx is contaminated, surgeon emailed, copy of labs faxed to surgeon office. UMESH Culp NP  This is a 79 y/o M with PMHx significant for Left Eye Blindness/Prosthesis, colon cancer (2012) s/p colon resection, subsequently discovered bladder cancer s/p cystoscopic resection and intravesicular gemcitabine in 2020. Patient is scheduled for Cystoscopy, Bladder Biopsy and Fulguration with Dr. Caicedo on 09/24/24. Per Chart Review patient undergoes cystoscopies every 6 months for noninvasive urothelial carcinoma.  Pt with additional hx of Atrial Fibrillation on Eliquis, s/p cardioversion/ cardiac ablation (2021, unsuccessful) , thoracic aortic aneurysm (4.2cm), T2DM, Hepatitis C (treated in the past).       9/3/2024 urine cx is contaminated, surgeon emailed, copy of labs faxed to surgeon office. UMESH Culp NP As per Dr. Caicedo, patient advised to repeat urine cx locally @ [facility] lab. I left message for the patient and his daughter . UMESH Culp NP  This is a 77 y/o M with PMHx significant for Left Eye Blindness/Prosthesis, colon cancer (2012) s/p colon resection, subsequently discovered bladder cancer s/p cystoscopic resection and intravesicular gemcitabine in 2020. Patient is scheduled for Cystoscopy, Bladder Biopsy and Fulguration with Dr. Caicedo on 09/24/24. Per Chart Review patient undergoes cystoscopies every 6 months for noninvasive urothelial carcinoma.  Pt with additional hx of Atrial Fibrillation on Eliquis, s/p cardioversion/ cardiac ablation (2021, unsuccessful) , thoracic aortic aneurysm (4.2cm), T2DM, Hepatitis C (treated in the past).       9/3/2024 urine cx is contaminated, surgeon emailed, copy of labs faxed to surgeon office. UMESH Culp NP As per Dr. Caicedo, patient advised to repeat urine cx locally @ [facility] lab. I left message for the patient and his daughter . UMESH Culp NP     9/9/2024 repeat urine cx sent today. UMESH Culp NP

## 2024-09-04 DIAGNOSIS — C67.9 MALIGNANT NEOPLASM OF BLADDER, UNSPECIFIED: ICD-10-CM

## 2024-09-04 LAB
CULTURE RESULTS: SIGNIFICANT CHANGE UP
SPECIMEN SOURCE: SIGNIFICANT CHANGE UP

## 2024-09-05 ENCOUNTER — NON-APPOINTMENT (OUTPATIENT)
Age: 79
End: 2024-09-05

## 2024-09-09 PROCEDURE — 80048 BASIC METABOLIC PNL TOTAL CA: CPT

## 2024-09-09 PROCEDURE — 36415 COLL VENOUS BLD VENIPUNCTURE: CPT

## 2024-09-09 PROCEDURE — G0463: CPT

## 2024-09-09 PROCEDURE — 83036 HEMOGLOBIN GLYCOSYLATED A1C: CPT

## 2024-09-09 PROCEDURE — 87086 URINE CULTURE/COLONY COUNT: CPT

## 2024-09-09 PROCEDURE — 85027 COMPLETE CBC AUTOMATED: CPT

## 2024-09-10 LAB
CULTURE RESULTS: SIGNIFICANT CHANGE UP
SPECIMEN SOURCE: SIGNIFICANT CHANGE UP

## 2024-09-18 ENCOUNTER — APPOINTMENT (OUTPATIENT)
Dept: CARDIOLOGY | Facility: CLINIC | Age: 79
End: 2024-09-18

## 2024-09-18 ENCOUNTER — APPOINTMENT (OUTPATIENT)
Dept: CARDIOLOGY | Facility: CLINIC | Age: 79
End: 2024-09-18
Payer: MEDICARE

## 2024-09-18 ENCOUNTER — NON-APPOINTMENT (OUTPATIENT)
Age: 79
End: 2024-09-18

## 2024-09-18 ENCOUNTER — LABORATORY RESULT (OUTPATIENT)
Age: 79
End: 2024-09-18

## 2024-09-18 VITALS
HEART RATE: 82 BPM | WEIGHT: 169 LBS | OXYGEN SATURATION: 98 % | BODY MASS INDEX: 27.16 KG/M2 | DIASTOLIC BLOOD PRESSURE: 75 MMHG | HEIGHT: 66 IN | SYSTOLIC BLOOD PRESSURE: 118 MMHG

## 2024-09-18 DIAGNOSIS — I25.10 ATHEROSCLEROTIC HEART DISEASE OF NATIVE CORONARY ARTERY W/OUT ANGINA PECTORIS: ICD-10-CM

## 2024-09-18 DIAGNOSIS — Z79.01 LONG TERM (CURRENT) USE OF ANTICOAGULANTS: ICD-10-CM

## 2024-09-18 DIAGNOSIS — E78.5 HYPERLIPIDEMIA, UNSPECIFIED: ICD-10-CM

## 2024-09-18 DIAGNOSIS — I48.0 PAROXYSMAL ATRIAL FIBRILLATION: ICD-10-CM

## 2024-09-18 DIAGNOSIS — Z01.810 ENCOUNTER FOR PREPROCEDURAL CARDIOVASCULAR EXAMINATION: ICD-10-CM

## 2024-09-18 PROCEDURE — 93306 TTE W/DOPPLER COMPLETE: CPT

## 2024-09-18 PROCEDURE — 99214 OFFICE O/P EST MOD 30 MIN: CPT

## 2024-09-18 PROCEDURE — 93000 ELECTROCARDIOGRAM COMPLETE: CPT

## 2024-09-20 LAB
ALBUMIN SERPL ELPH-MCNC: 4.3 G/DL
ALP BLD-CCNC: 62 U/L
ALT SERPL-CCNC: 9 U/L
ANION GAP SERPL CALC-SCNC: 18 MMOL/L
AST SERPL-CCNC: 15 U/L
BASOPHILS # BLD AUTO: 0.07 K/UL
BASOPHILS NFR BLD AUTO: 1.1 %
BILIRUB DIRECT SERPL-MCNC: 0.2 MG/DL
BILIRUB INDIRECT SERPL-MCNC: 0.4 MG/DL
BILIRUB SERPL-MCNC: 0.6 MG/DL
BUN SERPL-MCNC: 18 MG/DL
CALCIUM SERPL-MCNC: 9.2 MG/DL
CHLORIDE SERPL-SCNC: 106 MMOL/L
CHOLEST SERPL-MCNC: 148 MG/DL
CK SERPL-CCNC: 39 U/L
CO2 SERPL-SCNC: 18 MMOL/L
CREAT SERPL-MCNC: 0.94 MG/DL
CRP SERPL HS-MCNC: 1.36 MG/L
EGFR: 82 ML/MIN/1.73M2
EOSINOPHIL # BLD AUTO: 0.13 K/UL
EOSINOPHIL NFR BLD AUTO: 2 %
ESTIMATED AVERAGE GLUCOSE: 108 MG/DL
GLUCOSE SERPL-MCNC: 94 MG/DL
HBA1C MFR BLD HPLC: 5.4 %
HCT VFR BLD CALC: 45.1 %
HDLC SERPL-MCNC: 44 MG/DL
HGB BLD-MCNC: 15 G/DL
IMM GRANULOCYTES NFR BLD AUTO: 0.2 %
LDLC SERPL CALC-MCNC: 77 MG/DL
LDLC SERPL DIRECT ASSAY-MCNC: 82 MG/DL
LYMPHOCYTES # BLD AUTO: 2.37 K/UL
LYMPHOCYTES NFR BLD AUTO: 35.6 %
MAN DIFF?: NORMAL
MCHC RBC-ENTMCNC: 33.3 GM/DL
MCHC RBC-ENTMCNC: 35.6 PG
MCV RBC AUTO: 107.1 FL
MONOCYTES # BLD AUTO: 0.53 K/UL
MONOCYTES NFR BLD AUTO: 8 %
NEUTROPHILS # BLD AUTO: 3.54 K/UL
NEUTROPHILS NFR BLD AUTO: 53.1 %
NONHDLC SERPL-MCNC: 104 MG/DL
PLATELET # BLD AUTO: 196 K/UL
POTASSIUM SERPL-SCNC: 5 MMOL/L
PROT SERPL-MCNC: 6.5 G/DL
PSA SERPL-MCNC: 1.37 NG/ML
RBC # BLD: 4.21 M/UL
RBC # FLD: 13.2 %
SODIUM SERPL-SCNC: 142 MMOL/L
TRIGL SERPL-MCNC: 156 MG/DL
WBC # FLD AUTO: 6.65 K/UL

## 2024-09-23 ENCOUNTER — TRANSCRIPTION ENCOUNTER (OUTPATIENT)
Age: 79
End: 2024-09-23

## 2024-09-24 ENCOUNTER — TRANSCRIPTION ENCOUNTER (OUTPATIENT)
Age: 79
End: 2024-09-24

## 2024-09-24 ENCOUNTER — RESULT REVIEW (OUTPATIENT)
Age: 79
End: 2024-09-24

## 2024-09-24 ENCOUNTER — APPOINTMENT (OUTPATIENT)
Dept: UROLOGY | Facility: HOSPITAL | Age: 79
End: 2024-09-24

## 2024-09-24 ENCOUNTER — OUTPATIENT (OUTPATIENT)
Dept: OUTPATIENT SERVICES | Facility: HOSPITAL | Age: 79
LOS: 1 days | End: 2024-09-24
Payer: MEDICARE

## 2024-09-24 VITALS
HEART RATE: 77 BPM | OXYGEN SATURATION: 99 % | TEMPERATURE: 97 F | WEIGHT: 167.99 LBS | DIASTOLIC BLOOD PRESSURE: 85 MMHG | HEIGHT: 67 IN | RESPIRATION RATE: 16 BRPM | SYSTOLIC BLOOD PRESSURE: 125 MMHG

## 2024-09-24 VITALS
RESPIRATION RATE: 15 BRPM | SYSTOLIC BLOOD PRESSURE: 117 MMHG | DIASTOLIC BLOOD PRESSURE: 68 MMHG | OXYGEN SATURATION: 98 % | HEART RATE: 77 BPM

## 2024-09-24 DIAGNOSIS — Z92.241 PERSONAL HISTORY OF SYSTEMIC STEROID THERAPY: Chronic | ICD-10-CM

## 2024-09-24 DIAGNOSIS — C67.9 MALIGNANT NEOPLASM OF BLADDER, UNSPECIFIED: ICD-10-CM

## 2024-09-24 DIAGNOSIS — Z98.890 OTHER SPECIFIED POSTPROCEDURAL STATES: Chronic | ICD-10-CM

## 2024-09-24 DIAGNOSIS — Z90.49 ACQUIRED ABSENCE OF OTHER SPECIFIED PARTS OF DIGESTIVE TRACT: Chronic | ICD-10-CM

## 2024-09-24 DIAGNOSIS — Z98.89 OTHER SPECIFIED POSTPROCEDURAL STATES: Chronic | ICD-10-CM

## 2024-09-24 LAB — GLUCOSE BLDC GLUCOMTR-MCNC: 84 MG/DL — SIGNIFICANT CHANGE UP (ref 70–99)

## 2024-09-24 PROCEDURE — 88305 TISSUE EXAM BY PATHOLOGIST: CPT

## 2024-09-24 PROCEDURE — 82962 GLUCOSE BLOOD TEST: CPT

## 2024-09-24 PROCEDURE — 52234 CYSTOSCOPY AND TREATMENT: CPT

## 2024-09-24 PROCEDURE — 88305 TISSUE EXAM BY PATHOLOGIST: CPT | Mod: 26

## 2024-09-24 RX ORDER — FENTANYL CITRATE-0.9 % NACL/PF 300MCG/30
50 PATIENT CONTROLLED ANALGESIA VIAL INJECTION ONCE
Refills: 0 | Status: DISCONTINUED | OUTPATIENT
Start: 2024-09-24 | End: 2024-09-24

## 2024-09-24 RX ORDER — ONDANSETRON HCL/PF 4 MG/2 ML
4 VIAL (ML) INJECTION ONCE
Refills: 0 | Status: DISCONTINUED | OUTPATIENT
Start: 2024-09-24 | End: 2024-10-09

## 2024-09-24 RX ORDER — OXYCODONE HYDROCHLORIDE 30 MG/1
5 TABLET, FILM COATED, EXTENDED RELEASE ORAL ONCE
Refills: 0 | Status: DISCONTINUED | OUTPATIENT
Start: 2024-09-24 | End: 2024-09-24

## 2024-09-24 RX ORDER — APIXABAN 5 MG/1
1 TABLET, FILM COATED ORAL
Refills: 0 | DISCHARGE

## 2024-09-24 RX ORDER — CEFAZOLIN SODIUM 1 G
2000 VIAL (EA) INJECTION ONCE
Refills: 0 | Status: COMPLETED | OUTPATIENT
Start: 2024-09-24 | End: 2024-09-24

## 2024-09-24 RX ORDER — LIDOCAINE HCL 20 MG/ML
0.2 AMPUL (ML) INJECTION ONCE
Refills: 0 | Status: COMPLETED | OUTPATIENT
Start: 2024-09-24 | End: 2024-09-24

## 2024-09-24 RX ADMIN — Medication 100 MILLILITER(S): at 13:31

## 2024-09-24 RX ADMIN — Medication 3 MILLILITER(S): at 13:13

## 2024-09-24 NOTE — PHYSICAL EXAM
[Well Developed] : well developed [Well Nourished] : well nourished [No Acute Distress] : no acute distress [Normal Conjunctiva] : normal conjunctiva [Normal Venous Pressure] : normal venous pressure [No Carotid Bruit] : no carotid bruit [Clear Lung Fields] : clear lung fields [Good Air Entry] : good air entry [No Respiratory Distress] : no respiratory distress  [Soft] : abdomen soft [Non Tender] : non-tender [No Masses/organomegaly] : no masses/organomegaly [Normal Bowel Sounds] : normal bowel sounds [Normal Gait] : normal gait [No Edema] : no edema [No Cyanosis] : no cyanosis [No Clubbing] : no clubbing [No Varicosities] : no varicosities [No Rash] : no rash [No Skin Lesions] : no skin lesions [Moves all extremities] : moves all extremities [No Focal Deficits] : no focal deficits [Normal Speech] : normal speech [Alert and Oriented] : alert and oriented [Normal memory] : normal memory [de-identified] : Regular rate and rhythm, NL S1, S2, non-displaced PMI, chest non-tender; no rubs,heaves  or gallops a  Grade 2/6 systolic murmur noted at the LSB

## 2024-09-24 NOTE — ASU DISCHARGE PLAN (ADULT/PEDIATRIC) - NURSING INSTRUCTIONS
OK to take Tylenol/Acetaminophen at 9pm for pain and every 6 hours after as needed. OK to take Motrin/Ibuprofen as soon as needed for pain and every 6 hours after as needed.

## 2024-09-24 NOTE — HISTORY OF PRESENT ILLNESS
[FreeTextEntry1] : TAJ HUBBARD is a 78 year old M w/ pmhx of pmhx of, Afib, pre DM , thoracic aortic aneurysm (4.2cm), bladder CA, and colon CA, Hep C who presents today for routine follow up. The patient denies fever, chills, sore throat, loss of taste or smell, muscle aches weight loss, malaise, rash, recent travel, insect bites, alteration bowel habits, headaches, weakness, abdominal pain, bloating, changes in urination, visual disturbances, shortness of breath, chest pain, dizziness, palpitations.  The patient is also here for follow-up of atrial fibrillation and currently they feel well with an occasional episode of palpitations. The patient states they are reliably taking the anticoagulation medicine and are not having any signs of bleeding they deny any dizziness headaches nosebleeds or black tarry stools .He stopped his eliquis .  The patient is also here for f/u of pre-diabetess on prior blood test the patient is trying to follow a low carb diet and avoid simple sugars. they deny excessive thirst or urination and any blurred vision.  pt is here for follow up of dilated aorta they have been taking medication as prescribed and blood pressure have been observed in the a reasonable range, their are no reports of worrisome symptoms, such as tearing or "knife like' back pain, chest pain or syncope. I was asked to see this delightful patient today for Pre-op Evaluation  prior to  two surgeries  _1- cystoscopy, bladder biopsy and fulguration,  2- eye socket revision for new prosthesis____  with   Dr. Marifer Black urology and  Haley Hauser_ [place]  at fax number  [phone] .  The patient denies fever, cough, wheezing, sputum production, hemoptysis, dyspnea, congestion, diarrhea, constipation, nausea, vomiting, bright red blood per rectum, melena, angina, chest pain, palpitations, diaphoresis, PND, incontinence, frequency, urgency, dysuria, edema, joint pain, headache, weakness, numbness and dizziness. The date of the planned procedure is: _ 9/24/2024 at [facility] and at [id number]/10/2024.

## 2024-09-24 NOTE — ASU PATIENT PROFILE, ADULT - NSICDXPASTMEDICALHX_GEN_ALL_CORE_FT
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 follow up after an year, last visit 11/2022    Chronic back pain -on oxycodone prn and tylenol    Cigar smoker     Diabetes mellitus, type 2     Epistaxis hospitalized 3/2021- resolved    Hepatitis C Pt was treated 7 years ago    History of peripheral neuropathy     History of sciatica leg pains    LPRD (laryngopharyngeal reflux disease)     Lumbar disc disease     Malignant neoplasm of colon, unspecified part of colon s/p microsurgery at [facility]  no chemo/XRT in remission since 2012    Mild intermittent stable asthma

## 2024-09-24 NOTE — PHYSICAL EXAM
[Well Developed] : well developed [Well Nourished] : well nourished [No Acute Distress] : no acute distress [Normal Conjunctiva] : normal conjunctiva [Normal Venous Pressure] : normal venous pressure [No Carotid Bruit] : no carotid bruit [Clear Lung Fields] : clear lung fields [Good Air Entry] : good air entry [No Respiratory Distress] : no respiratory distress  [Soft] : abdomen soft [Non Tender] : non-tender [No Masses/organomegaly] : no masses/organomegaly [Normal Bowel Sounds] : normal bowel sounds [Normal Gait] : normal gait [No Edema] : no edema [No Cyanosis] : no cyanosis [No Clubbing] : no clubbing [No Varicosities] : no varicosities [No Rash] : no rash [No Skin Lesions] : no skin lesions [Moves all extremities] : moves all extremities [No Focal Deficits] : no focal deficits [Normal Speech] : normal speech [Alert and Oriented] : alert and oriented [Normal memory] : normal memory [de-identified] : Regular rate and rhythm, NL S1, S2, non-displaced PMI, chest non-tender; no rubs,heaves  or gallops a  Grade 2/6 systolic murmur noted at the LSB

## 2024-09-24 NOTE — HISTORY OF PRESENT ILLNESS
[FreeTextEntry1] : TAJ HUBBARD is a 78 year old M w/ pmhx of pmhx of, Afib, pre DM , thoracic aortic aneurysm (4.2cm), bladder CA, and colon CA, Hep C who presents today for routine follow up. The patient denies fever, chills, sore throat, loss of taste or smell, muscle aches weight loss, malaise, rash, recent travel, insect bites, alteration bowel habits, headaches, weakness, abdominal pain, bloating, changes in urination, visual disturbances, shortness of breath, chest pain, dizziness, palpitations.  The patient is also here for follow-up of atrial fibrillation and currently they feel well with an occasional episode of palpitations. The patient states they are reliably taking the anticoagulation medicine and are not having any signs of bleeding they deny any dizziness headaches nosebleeds or black tarry stools .He stopped his eliquis .  The patient is also here for f/u of pre-diabetess on prior blood test the patient is trying to follow a low carb diet and avoid simple sugars. they deny excessive thirst or urination and any blurred vision.  pt is here for follow up of dilated aorta they have been taking medication as prescribed and blood pressure have been observed in the a reasonable range, their are no reports of worrisome symptoms, such as tearing or "knife like' back pain, chest pain or syncope. I was asked to see this delightful patient today for Pre-op Evaluation  prior to  two surgeries  _1- cystoscopy, bladder biopsy and fulguration,  2- eye socket revision for new prosthesis____  with   Dr. Marifer Black urology and  Haley Hauser_ [place]  at fax number  [phone] .  The patient denies fever, cough, wheezing, sputum production, hemoptysis, dyspnea, congestion, diarrhea, constipation, nausea, vomiting, bright red blood per rectum, melena, angina, chest pain, palpitations, diaphoresis, PND, incontinence, frequency, urgency, dysuria, edema, joint pain, headache, weakness, numbness and dizziness. The date of the planned procedure is: _ 9/24/2024 at [facility] and at [id number]/2024.

## 2024-09-27 LAB — SURGICAL PATHOLOGY STUDY: SIGNIFICANT CHANGE UP

## 2024-10-22 ENCOUNTER — APPOINTMENT (OUTPATIENT)
Dept: CARDIOLOGY | Facility: CLINIC | Age: 79
End: 2024-10-22

## 2024-10-22 VITALS
DIASTOLIC BLOOD PRESSURE: 72 MMHG | OXYGEN SATURATION: 99 % | BODY MASS INDEX: 27.18 KG/M2 | HEIGHT: 66 IN | SYSTOLIC BLOOD PRESSURE: 118 MMHG | HEART RATE: 75 BPM | WEIGHT: 169.13 LBS | TEMPERATURE: 97.5 F

## 2024-10-22 DIAGNOSIS — E11.9 TYPE 2 DIABETES MELLITUS W/OUT COMPLICATIONS: ICD-10-CM

## 2024-10-22 DIAGNOSIS — Z79.01 LONG TERM (CURRENT) USE OF ANTICOAGULANTS: ICD-10-CM

## 2024-10-22 DIAGNOSIS — I10 ESSENTIAL (PRIMARY) HYPERTENSION: ICD-10-CM

## 2024-10-22 DIAGNOSIS — I25.10 ATHEROSCLEROTIC HEART DISEASE OF NATIVE CORONARY ARTERY W/OUT ANGINA PECTORIS: ICD-10-CM

## 2024-10-22 DIAGNOSIS — I71.9 AORTIC ANEURYSM OF UNSPECIFIED SITE, W/OUT RUPTURE: ICD-10-CM

## 2024-10-22 DIAGNOSIS — I48.0 PAROXYSMAL ATRIAL FIBRILLATION: ICD-10-CM

## 2024-10-22 DIAGNOSIS — E78.5 HYPERLIPIDEMIA, UNSPECIFIED: ICD-10-CM

## 2024-10-22 PROCEDURE — G2211 COMPLEX E/M VISIT ADD ON: CPT

## 2024-10-22 PROCEDURE — G0008: CPT

## 2024-10-22 PROCEDURE — 99214 OFFICE O/P EST MOD 30 MIN: CPT

## 2024-10-22 PROCEDURE — 90662 IIV NO PRSV INCREASED AG IM: CPT

## 2024-10-23 ENCOUNTER — LABORATORY RESULT (OUTPATIENT)
Age: 79
End: 2024-10-23

## 2024-10-23 LAB
ANION GAP SERPL CALC-SCNC: 14 MMOL/L
BUN SERPL-MCNC: 16 MG/DL
CALCIUM SERPL-MCNC: 9.1 MG/DL
CHLORIDE SERPL-SCNC: 108 MMOL/L
CO2 SERPL-SCNC: 21 MMOL/L
CREAT SERPL-MCNC: 1.02 MG/DL
EGFR: 75 ML/MIN/1.73M2
GLUCOSE SERPL-MCNC: 96 MG/DL
POTASSIUM SERPL-SCNC: 4.4 MMOL/L
SODIUM SERPL-SCNC: 142 MMOL/L

## 2024-10-25 ENCOUNTER — NON-APPOINTMENT (OUTPATIENT)
Age: 79
End: 2024-10-25

## 2024-10-25 LAB
BASOPHILS # BLD AUTO: 0.05 K/UL
BASOPHILS NFR BLD AUTO: 0.6 %
EOSINOPHIL # BLD AUTO: 0.19 K/UL
EOSINOPHIL NFR BLD AUTO: 2.4 %
HCT VFR BLD CALC: 42.8 %
HGB BLD-MCNC: 14.1 G/DL
IMM GRANULOCYTES NFR BLD AUTO: 0.3 %
LYMPHOCYTES # BLD AUTO: 1.16 K/UL
LYMPHOCYTES NFR BLD AUTO: 14.7 %
MAN DIFF?: NORMAL
MCHC RBC-ENTMCNC: 32.9 GM/DL
MCHC RBC-ENTMCNC: 35.4 PG
MCV RBC AUTO: 107.5 FL
MONOCYTES # BLD AUTO: 0.53 K/UL
MONOCYTES NFR BLD AUTO: 6.7 %
NEUTROPHILS # BLD AUTO: 5.95 K/UL
NEUTROPHILS NFR BLD AUTO: 75.3 %
PLATELET # BLD AUTO: 193 K/UL
RBC # BLD: 3.98 M/UL
RBC # FLD: 12.8 %
WBC # FLD AUTO: 7.9 K/UL

## 2024-11-05 RX ORDER — BLOOD-GLUCOSE METER
W/DEVICE KIT MISCELLANEOUS
Qty: 1 | Refills: 0 | Status: ACTIVE | COMMUNITY
Start: 2024-11-05 | End: 1900-01-01

## 2024-11-05 RX ORDER — LANCETS 33 GAUGE
EACH MISCELLANEOUS
Qty: 1 | Refills: 0 | Status: ACTIVE | COMMUNITY
Start: 2024-11-05 | End: 1900-01-01
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
## 2024-11-20 ENCOUNTER — APPOINTMENT (OUTPATIENT)
Dept: CARDIOLOGY | Facility: CLINIC | Age: 79
End: 2024-11-20
Payer: MEDICARE

## 2024-11-20 VITALS
TEMPERATURE: 98 F | OXYGEN SATURATION: 99 % | HEART RATE: 83 BPM | HEIGHT: 66 IN | DIASTOLIC BLOOD PRESSURE: 72 MMHG | SYSTOLIC BLOOD PRESSURE: 117 MMHG

## 2024-11-20 DIAGNOSIS — E11.9 TYPE 2 DIABETES MELLITUS W/OUT COMPLICATIONS: ICD-10-CM

## 2024-11-20 DIAGNOSIS — I71.9 AORTIC ANEURYSM OF UNSPECIFIED SITE, W/OUT RUPTURE: ICD-10-CM

## 2024-11-20 DIAGNOSIS — Z90.01 ACQUIRED ABSENCE OF EYE: ICD-10-CM

## 2024-11-20 DIAGNOSIS — E78.5 HYPERLIPIDEMIA, UNSPECIFIED: ICD-10-CM

## 2024-11-20 DIAGNOSIS — I10 ESSENTIAL (PRIMARY) HYPERTENSION: ICD-10-CM

## 2024-11-20 DIAGNOSIS — I25.10 ATHEROSCLEROTIC HEART DISEASE OF NATIVE CORONARY ARTERY W/OUT ANGINA PECTORIS: ICD-10-CM

## 2024-11-20 DIAGNOSIS — I48.91 UNSPECIFIED ATRIAL FIBRILLATION: ICD-10-CM

## 2024-11-20 PROCEDURE — G2211 COMPLEX E/M VISIT ADD ON: CPT

## 2024-11-20 PROCEDURE — 99214 OFFICE O/P EST MOD 30 MIN: CPT

## 2024-12-13 ENCOUNTER — NON-APPOINTMENT (OUTPATIENT)
Age: 79
End: 2024-12-13

## 2025-01-02 ENCOUNTER — NON-APPOINTMENT (OUTPATIENT)
Age: 80
End: 2025-01-02

## 2025-01-02 ENCOUNTER — APPOINTMENT (OUTPATIENT)
Dept: CARDIOLOGY | Facility: CLINIC | Age: 80
End: 2025-01-02
Payer: MEDICARE

## 2025-01-02 VITALS
TEMPERATURE: 97.4 F | HEART RATE: 70 BPM | BODY MASS INDEX: 27.8 KG/M2 | SYSTOLIC BLOOD PRESSURE: 102 MMHG | WEIGHT: 173 LBS | DIASTOLIC BLOOD PRESSURE: 68 MMHG | OXYGEN SATURATION: 98 % | HEIGHT: 66 IN

## 2025-01-02 DIAGNOSIS — E78.5 HYPERLIPIDEMIA, UNSPECIFIED: ICD-10-CM

## 2025-01-02 DIAGNOSIS — I48.91 UNSPECIFIED ATRIAL FIBRILLATION: ICD-10-CM

## 2025-01-02 DIAGNOSIS — I10 ESSENTIAL (PRIMARY) HYPERTENSION: ICD-10-CM

## 2025-01-02 DIAGNOSIS — E11.9 TYPE 2 DIABETES MELLITUS W/OUT COMPLICATIONS: ICD-10-CM

## 2025-01-02 DIAGNOSIS — I71.9 AORTIC ANEURYSM OF UNSPECIFIED SITE, W/OUT RUPTURE: ICD-10-CM

## 2025-01-02 DIAGNOSIS — I25.10 ATHEROSCLEROTIC HEART DISEASE OF NATIVE CORONARY ARTERY W/OUT ANGINA PECTORIS: ICD-10-CM

## 2025-01-02 DIAGNOSIS — Z97.0 PRESENCE OF ARTIFICIAL EYE: ICD-10-CM

## 2025-01-02 PROCEDURE — G2211 COMPLEX E/M VISIT ADD ON: CPT

## 2025-01-02 PROCEDURE — 93000 ELECTROCARDIOGRAM COMPLETE: CPT

## 2025-01-02 PROCEDURE — 99214 OFFICE O/P EST MOD 30 MIN: CPT

## 2025-01-03 LAB
ALBUMIN SERPL ELPH-MCNC: 4 G/DL
ALP BLD-CCNC: 68 U/L
ALT SERPL-CCNC: 11 U/L
ANION GAP SERPL CALC-SCNC: 15 MMOL/L
APO B SERPL-MCNC: 71 MG/DL
AST SERPL-CCNC: 16 U/L
BASOPHILS # BLD AUTO: 0.05 K/UL
BASOPHILS NFR BLD AUTO: 0.6 %
BILIRUB DIRECT SERPL-MCNC: 0.2 MG/DL
BILIRUB INDIRECT SERPL-MCNC: 0.4 MG/DL
BILIRUB SERPL-MCNC: 0.5 MG/DL
BUN SERPL-MCNC: 14 MG/DL
CALCIUM SERPL-MCNC: 9.4 MG/DL
CHLORIDE SERPL-SCNC: 102 MMOL/L
CHOLEST SERPL-MCNC: 136 MG/DL
CK SERPL-CCNC: 46 U/L
CO2 SERPL-SCNC: 23 MMOL/L
CREAT SERPL-MCNC: 0.99 MG/DL
CRP SERPL HS-MCNC: 2.36 MG/L
EGFR: 77 ML/MIN/1.73M2
EOSINOPHIL # BLD AUTO: 0.15 K/UL
EOSINOPHIL NFR BLD AUTO: 1.9 %
ESTIMATED AVERAGE GLUCOSE: 114 MG/DL
GLUCOSE SERPL-MCNC: 93 MG/DL
HBA1C MFR BLD HPLC: 5.6 %
HCT VFR BLD CALC: 45.8 %
HDLC SERPL-MCNC: 37 MG/DL
HGB BLD-MCNC: 15.3 G/DL
IMM GRANULOCYTES NFR BLD AUTO: 0.1 %
LDLC SERPL CALC-MCNC: 65 MG/DL
LDLC SERPL DIRECT ASSAY-MCNC: 70 MG/DL
LYMPHOCYTES # BLD AUTO: 2.74 K/UL
LYMPHOCYTES NFR BLD AUTO: 35.4 %
MAN DIFF?: NORMAL
MCHC RBC-ENTMCNC: 33.4 G/DL
MCHC RBC-ENTMCNC: 34.8 PG
MCV RBC AUTO: 104.1 FL
MONOCYTES # BLD AUTO: 0.57 K/UL
MONOCYTES NFR BLD AUTO: 7.4 %
NEUTROPHILS # BLD AUTO: 4.22 K/UL
NEUTROPHILS NFR BLD AUTO: 54.6 %
NONHDLC SERPL-MCNC: 98 MG/DL
PLATELET # BLD AUTO: 197 K/UL
POTASSIUM SERPL-SCNC: 5.3 MMOL/L
PROT SERPL-MCNC: 6.5 G/DL
RBC # BLD: 4.4 M/UL
RBC # FLD: 12.2 %
SODIUM SERPL-SCNC: 139 MMOL/L
TRIGL SERPL-MCNC: 199 MG/DL
WBC # FLD AUTO: 7.74 K/UL

## 2025-03-19 ENCOUNTER — NON-APPOINTMENT (OUTPATIENT)
Age: 80
End: 2025-03-19

## 2025-03-19 ENCOUNTER — APPOINTMENT (OUTPATIENT)
Dept: CARDIOLOGY | Facility: CLINIC | Age: 80
End: 2025-03-19
Payer: MEDICARE

## 2025-03-19 ENCOUNTER — LABORATORY RESULT (OUTPATIENT)
Age: 80
End: 2025-03-19

## 2025-03-19 VITALS — SYSTOLIC BLOOD PRESSURE: 114 MMHG | DIASTOLIC BLOOD PRESSURE: 74 MMHG

## 2025-03-19 VITALS — OXYGEN SATURATION: 98 % | WEIGHT: 173 LBS | BODY MASS INDEX: 27.8 KG/M2 | HEIGHT: 66 IN | HEART RATE: 70 BPM

## 2025-03-19 DIAGNOSIS — I48.91 UNSPECIFIED ATRIAL FIBRILLATION: ICD-10-CM

## 2025-03-19 DIAGNOSIS — E78.5 HYPERLIPIDEMIA, UNSPECIFIED: ICD-10-CM

## 2025-03-19 DIAGNOSIS — I25.10 ATHEROSCLEROTIC HEART DISEASE OF NATIVE CORONARY ARTERY W/OUT ANGINA PECTORIS: ICD-10-CM

## 2025-03-19 DIAGNOSIS — I10 ESSENTIAL (PRIMARY) HYPERTENSION: ICD-10-CM

## 2025-03-19 DIAGNOSIS — I71.9 AORTIC ANEURYSM OF UNSPECIFIED SITE, W/OUT RUPTURE: ICD-10-CM

## 2025-03-19 DIAGNOSIS — Z01.810 ENCOUNTER FOR PREPROCEDURAL CARDIOVASCULAR EXAMINATION: ICD-10-CM

## 2025-03-19 DIAGNOSIS — E11.9 TYPE 2 DIABETES MELLITUS W/OUT COMPLICATIONS: ICD-10-CM

## 2025-03-19 PROCEDURE — G2211 COMPLEX E/M VISIT ADD ON: CPT

## 2025-03-19 PROCEDURE — 93000 ELECTROCARDIOGRAM COMPLETE: CPT | Mod: NC

## 2025-03-19 PROCEDURE — 99214 OFFICE O/P EST MOD 30 MIN: CPT

## 2025-03-20 DIAGNOSIS — C67.9 MALIGNANT NEOPLASM OF BLADDER, UNSPECIFIED: ICD-10-CM

## 2025-03-20 LAB
ANION GAP SERPL CALC-SCNC: 11 MMOL/L
BUN SERPL-MCNC: 19 MG/DL
CALCIUM SERPL-MCNC: 9.4 MG/DL
CHLORIDE SERPL-SCNC: 108 MMOL/L
CO2 SERPL-SCNC: 25 MMOL/L
CREAT SERPL-MCNC: 0.89 MG/DL
EGFRCR SERPLBLD CKD-EPI 2021: 87 ML/MIN/1.73M2
GLUCOSE SERPL-MCNC: 117 MG/DL
POTASSIUM SERPL-SCNC: 5.9 MMOL/L
SODIUM SERPL-SCNC: 144 MMOL/L

## 2025-03-20 RX ORDER — SODIUM ZIRCONIUM CYCLOSILICATE 10 G/10G
10 POWDER, FOR SUSPENSION ORAL
Qty: 2 | Refills: 0 | Status: ACTIVE | COMMUNITY
Start: 2025-03-20 | End: 1900-01-01

## 2025-03-21 LAB
ALBUMIN SERPL ELPH-MCNC: 4.4 G/DL
ALP BLD-CCNC: 90 U/L
ALT SERPL-CCNC: 14 U/L
AST SERPL-CCNC: 13 U/L
BASOPHILS # BLD AUTO: 0.07 K/UL
BASOPHILS NFR BLD AUTO: 0.7 %
BILIRUB DIRECT SERPL-MCNC: 0.1 MG/DL
BILIRUB INDIRECT SERPL-MCNC: 0.3 MG/DL
BILIRUB SERPL-MCNC: 0.4 MG/DL
CHOLEST SERPL-MCNC: 171 MG/DL
CK SERPL-CCNC: 20 U/L
CRP SERPL HS-MCNC: 0.59 MG/L
EOSINOPHIL # BLD AUTO: 0.19 K/UL
EOSINOPHIL NFR BLD AUTO: 1.9 %
ESTIMATED AVERAGE GLUCOSE: 123 MG/DL
HBA1C MFR BLD HPLC: 5.9 %
HCT VFR BLD CALC: 48.9 %
HDLC SERPL-MCNC: 53 MG/DL
HGB BLD-MCNC: 16 G/DL
IMM GRANULOCYTES NFR BLD AUTO: 0.4 %
LDLC SERPL DIRECT ASSAY-MCNC: 107 MG/DL
LDLC SERPL-MCNC: 97 MG/DL
LYMPHOCYTES # BLD AUTO: 2.75 K/UL
LYMPHOCYTES NFR BLD AUTO: 28 %
MAN DIFF?: NORMAL
MCHC RBC-ENTMCNC: 32.7 G/DL
MCHC RBC-ENTMCNC: 35.4 PG
MCV RBC AUTO: 108.2 FL
MONOCYTES # BLD AUTO: 0.88 K/UL
MONOCYTES NFR BLD AUTO: 9 %
NEUTROPHILS # BLD AUTO: 5.9 K/UL
NEUTROPHILS NFR BLD AUTO: 60 %
NONHDLC SERPL-MCNC: 118 MG/DL
PLATELET # BLD AUTO: 216 K/UL
PROT SERPL-MCNC: 6.9 G/DL
RBC # BLD: 4.52 M/UL
RBC # FLD: 13.7 %
TRIGL SERPL-MCNC: 116 MG/DL
WBC # FLD AUTO: 9.83 K/UL

## 2025-03-26 LAB
ANION GAP SERPL CALC-SCNC: 9 MMOL/L
BUN SERPL-MCNC: 18 MG/DL
CALCIUM SERPL-MCNC: 8.8 MG/DL
CHLORIDE SERPL-SCNC: 110 MMOL/L
CO2 SERPL-SCNC: 23 MMOL/L
CREAT SERPL-MCNC: 0.9 MG/DL
EGFRCR SERPLBLD CKD-EPI 2021: 86 ML/MIN/1.73M2
GLUCOSE SERPL-MCNC: 96 MG/DL
POTASSIUM SERPL-SCNC: 5.1 MMOL/L
SODIUM SERPL-SCNC: 142 MMOL/L

## 2025-04-14 ENCOUNTER — NON-APPOINTMENT (OUTPATIENT)
Age: 80
End: 2025-04-14

## 2025-04-16 ENCOUNTER — APPOINTMENT (OUTPATIENT)
Dept: GASTROENTEROLOGY | Facility: CLINIC | Age: 80
End: 2025-04-16
Payer: MEDICARE

## 2025-04-16 ENCOUNTER — APPOINTMENT (OUTPATIENT)
Dept: CARDIOLOGY | Facility: CLINIC | Age: 80
End: 2025-04-16

## 2025-04-16 VITALS
SYSTOLIC BLOOD PRESSURE: 118 MMHG | HEART RATE: 68 BPM | HEIGHT: 67 IN | OXYGEN SATURATION: 98 % | DIASTOLIC BLOOD PRESSURE: 74 MMHG | WEIGHT: 172 LBS | TEMPERATURE: 97.5 F | BODY MASS INDEX: 27 KG/M2

## 2025-04-16 DIAGNOSIS — Z86.0101 PERSONAL HISTORY OF ADENOMATOUS AND SERRATED COLON POLYPS: ICD-10-CM

## 2025-04-16 PROCEDURE — 99203 OFFICE O/P NEW LOW 30 MIN: CPT

## 2025-04-16 RX ORDER — FAMOTIDINE 10 MG/1
TABLET, FILM COATED ORAL
Refills: 0 | Status: ACTIVE | COMMUNITY

## 2025-04-16 RX ORDER — OXYCODONE HYDROCHLORIDE 5 MG/1
CAPSULE ORAL
Refills: 0 | Status: ACTIVE | COMMUNITY

## 2025-04-16 RX ORDER — FOLIC ACID 20 MG
CAPSULE ORAL
Refills: 0 | Status: ACTIVE | COMMUNITY

## 2025-04-16 RX ORDER — MULTIVIT-MIN/FA/LYCOPEN/LUTEIN .4-300-25
TABLET ORAL
Refills: 0 | Status: ACTIVE | COMMUNITY

## 2025-04-23 ENCOUNTER — OUTPATIENT (OUTPATIENT)
Dept: OUTPATIENT SERVICES | Facility: HOSPITAL | Age: 80
LOS: 1 days | End: 2025-04-23
Payer: MEDICARE

## 2025-04-23 VITALS
RESPIRATION RATE: 18 BRPM | OXYGEN SATURATION: 97 % | DIASTOLIC BLOOD PRESSURE: 77 MMHG | HEIGHT: 66 IN | TEMPERATURE: 98 F | WEIGHT: 164.91 LBS | SYSTOLIC BLOOD PRESSURE: 115 MMHG | HEART RATE: 83 BPM

## 2025-04-23 DIAGNOSIS — C67.9 MALIGNANT NEOPLASM OF BLADDER, UNSPECIFIED: ICD-10-CM

## 2025-04-23 DIAGNOSIS — Z01.818 ENCOUNTER FOR OTHER PREPROCEDURAL EXAMINATION: ICD-10-CM

## 2025-04-23 DIAGNOSIS — Z98.89 OTHER SPECIFIED POSTPROCEDURAL STATES: Chronic | ICD-10-CM

## 2025-04-23 DIAGNOSIS — E11.9 TYPE 2 DIABETES MELLITUS WITHOUT COMPLICATIONS: ICD-10-CM

## 2025-04-23 DIAGNOSIS — Z92.241 PERSONAL HISTORY OF SYSTEMIC STEROID THERAPY: Chronic | ICD-10-CM

## 2025-04-23 DIAGNOSIS — Z98.890 OTHER SPECIFIED POSTPROCEDURAL STATES: Chronic | ICD-10-CM

## 2025-04-23 DIAGNOSIS — I48.91 UNSPECIFIED ATRIAL FIBRILLATION: ICD-10-CM

## 2025-04-23 DIAGNOSIS — Z90.49 ACQUIRED ABSENCE OF OTHER SPECIFIED PARTS OF DIGESTIVE TRACT: Chronic | ICD-10-CM

## 2025-04-23 PROCEDURE — 85027 COMPLETE CBC AUTOMATED: CPT

## 2025-04-23 PROCEDURE — 80053 COMPREHEN METABOLIC PANEL: CPT

## 2025-04-23 PROCEDURE — G0463: CPT

## 2025-04-23 PROCEDURE — 87086 URINE CULTURE/COLONY COUNT: CPT

## 2025-04-23 RX ORDER — SODIUM CHLORIDE 9 G/1000ML
1000 INJECTION, SOLUTION INTRAVENOUS
Refills: 0 | Status: DISCONTINUED | OUTPATIENT
Start: 2025-05-14 | End: 2025-05-28

## 2025-04-23 RX ORDER — LIDOCAINE HCL/PF 10 MG/ML
0.2 VIAL (ML) INJECTION ONCE
Refills: 0 | Status: DISCONTINUED | OUTPATIENT
Start: 2025-05-14 | End: 2025-05-28

## 2025-04-23 NOTE — H&P PST ADULT - NSICDXPASTMEDICALHX_GEN_ALL_CORE_FT
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 follow up after an year, last visit 11/2022    Chronic back pain -on oxycodone prn and tylenol    Cigar smoker     Colon cancer     Diabetes mellitus, type 2     Epistaxis hospitalized 3/2021- resolved    Hepatitis C Pt was treated 7 years ago    History of peripheral neuropathy     History of sciatica leg pains    LPRD (laryngopharyngeal reflux disease)     Lumbar disc disease     Malignant neoplasm of colon, unspecified part of colon s/p microsurgery at [facility]  no chemo/XRT in remission since 2012    Mild intermittent stable asthma     Vertigo

## 2025-04-23 NOTE — H&P PST ADULT - HISTORY OF PRESENT ILLNESS
This is a 80 y/o M with PMHx significant for Left Eye Blindness/Prosthesis, colon cancer (2012) s/p colon resection, subsequently discovered bladder cancer s/p cystoscopic resection and intravesicular gemcitabine in 2020. Patient is scheduled for Cystoscopy, Bladder Biopsy and Fulguration with Dr. Caicedo on 5/14/2025. Per Chart Review patient undergoes cystoscopies every 6 months for noninvasive urothelial carcinoma last visit 9/24/2024  Pt with additional hx of Atrial Fibrillation on Eliquis, s/p cardioversion/ cardiac ablation (2021, unsuccessful) , thoracic aortic aneurysm (4.3cm), T2DM, Hepatitis C (treated in the past). with sustained viral response.            This is a 80 y/o M with PMHx significant for Left Eye Blindness/Prosthesis, colon cancer (2012) s/p colon resection, subsequently discovered bladder cancer s/p cystoscopic resection and intravesicular gemcitabine in 2020. Patient is scheduled for Cystoscopy, Bladder Biopsy and Fulguration with Dr. Caicedo on 5/14/2025. Per Chart Review patient undergoes cystoscopies every 6 months for noninvasive urothelial carcinoma last visit 9/24/2024  Pt with additional hx of Atrial Fibrillation on Eliquis, s/p cardioversion/ cardiac ablation (2021, unsuccessful) , thoracic aortic aneurysm (4.3cm), T2DM, Hepatitis C (treated in the past). with sustained viral response. Pt has had a few recent  eye surgeries on blind eye.

## 2025-04-25 PROBLEM — C18.9 MALIGNANT NEOPLASM OF COLON, UNSPECIFIED: Chronic | Status: ACTIVE | Noted: 2025-04-23

## 2025-04-25 PROBLEM — R42 DIZZINESS AND GIDDINESS: Chronic | Status: ACTIVE | Noted: 2025-04-23

## 2025-05-02 ENCOUNTER — RX RENEWAL (OUTPATIENT)
Age: 80
End: 2025-05-02

## 2025-05-05 ENCOUNTER — APPOINTMENT (OUTPATIENT)
Dept: PULMONOLOGY | Facility: CLINIC | Age: 80
End: 2025-05-05

## 2025-05-06 ENCOUNTER — NON-APPOINTMENT (OUTPATIENT)
Age: 80
End: 2025-05-06

## 2025-05-06 ENCOUNTER — APPOINTMENT (OUTPATIENT)
Dept: CARDIOLOGY | Facility: CLINIC | Age: 80
End: 2025-05-06
Payer: MEDICARE

## 2025-05-06 VITALS
HEART RATE: 80 BPM | SYSTOLIC BLOOD PRESSURE: 107 MMHG | WEIGHT: 172 LBS | OXYGEN SATURATION: 97 % | HEIGHT: 67 IN | DIASTOLIC BLOOD PRESSURE: 60 MMHG | BODY MASS INDEX: 27 KG/M2

## 2025-05-06 DIAGNOSIS — Z00.00 ENCOUNTER FOR GENERAL ADULT MEDICAL EXAMINATION W/OUT ABNORMAL FINDINGS: ICD-10-CM

## 2025-05-06 DIAGNOSIS — Z01.810 ENCOUNTER FOR PREPROCEDURAL CARDIOVASCULAR EXAMINATION: ICD-10-CM

## 2025-05-06 PROCEDURE — 93000 ELECTROCARDIOGRAM COMPLETE: CPT | Mod: NC

## 2025-05-06 PROCEDURE — G2211 COMPLEX E/M VISIT ADD ON: CPT

## 2025-05-06 PROCEDURE — 99214 OFFICE O/P EST MOD 30 MIN: CPT

## 2025-05-07 LAB
APTT BLD: 32.2 SEC
INR PPP: 1.15 RATIO
PT BLD: 13.6 SEC

## 2025-05-14 ENCOUNTER — APPOINTMENT (OUTPATIENT)
Dept: UROLOGY | Facility: HOSPITAL | Age: 80
End: 2025-05-14

## 2025-05-14 ENCOUNTER — TRANSCRIPTION ENCOUNTER (OUTPATIENT)
Age: 80
End: 2025-05-14

## 2025-05-14 ENCOUNTER — OUTPATIENT (OUTPATIENT)
Dept: OUTPATIENT SERVICES | Facility: HOSPITAL | Age: 80
LOS: 1 days | End: 2025-05-14
Payer: MEDICARE

## 2025-05-14 VITALS
HEART RATE: 72 BPM | DIASTOLIC BLOOD PRESSURE: 63 MMHG | SYSTOLIC BLOOD PRESSURE: 122 MMHG | OXYGEN SATURATION: 98 % | RESPIRATION RATE: 16 BRPM

## 2025-05-14 VITALS
WEIGHT: 164.91 LBS | SYSTOLIC BLOOD PRESSURE: 112 MMHG | DIASTOLIC BLOOD PRESSURE: 73 MMHG | TEMPERATURE: 98 F | HEIGHT: 66 IN | OXYGEN SATURATION: 100 % | HEART RATE: 81 BPM | RESPIRATION RATE: 16 BRPM

## 2025-05-14 DIAGNOSIS — Z98.89 OTHER SPECIFIED POSTPROCEDURAL STATES: Chronic | ICD-10-CM

## 2025-05-14 DIAGNOSIS — Z98.890 OTHER SPECIFIED POSTPROCEDURAL STATES: Chronic | ICD-10-CM

## 2025-05-14 DIAGNOSIS — C67.9 MALIGNANT NEOPLASM OF BLADDER, UNSPECIFIED: ICD-10-CM

## 2025-05-14 DIAGNOSIS — Z90.49 ACQUIRED ABSENCE OF OTHER SPECIFIED PARTS OF DIGESTIVE TRACT: Chronic | ICD-10-CM

## 2025-05-14 DIAGNOSIS — Z92.241 PERSONAL HISTORY OF SYSTEMIC STEROID THERAPY: Chronic | ICD-10-CM

## 2025-05-14 LAB — GLUCOSE BLDC GLUCOMTR-MCNC: 94 MG/DL — SIGNIFICANT CHANGE UP (ref 70–99)

## 2025-05-14 PROCEDURE — 52214 CYSTOSCOPY AND TREATMENT: CPT

## 2025-05-14 PROCEDURE — 82962 GLUCOSE BLOOD TEST: CPT

## 2025-05-14 DEVICE — URETHRAL DILATOR SET 8-24FR 37CM
Type: IMPLANTABLE DEVICE | Status: NON-FUNCTIONAL
Removed: 2025-05-14

## 2025-05-14 RX ORDER — OXYCODONE HYDROCHLORIDE 30 MG/1
1 TABLET ORAL
Refills: 0 | DISCHARGE

## 2025-05-14 RX ORDER — FENTANYL CITRATE-0.9 % NACL/PF 100MCG/2ML
25 SYRINGE (ML) INTRAVENOUS
Refills: 0 | Status: DISCONTINUED | OUTPATIENT
Start: 2025-05-14 | End: 2025-05-14

## 2025-05-14 RX ORDER — APIXABAN 2.5 MG/1
1 TABLET, FILM COATED ORAL
Refills: 0 | DISCHARGE

## 2025-05-14 RX ORDER — CEFAZOLIN SODIUM IN 0.9 % NACL 3 G/100 ML
2000 INTRAVENOUS SOLUTION, PIGGYBACK (ML) INTRAVENOUS ONCE
Refills: 0 | Status: COMPLETED | OUTPATIENT
Start: 2025-05-14 | End: 2025-05-14

## 2025-05-14 RX ORDER — METOPROLOL SUCCINATE 50 MG/1
1 TABLET, EXTENDED RELEASE ORAL
Refills: 0 | DISCHARGE

## 2025-05-14 RX ADMIN — SODIUM CHLORIDE 100 MILLILITER(S): 9 INJECTION, SOLUTION INTRAVENOUS at 08:51

## 2025-05-14 NOTE — ASU PATIENT PROFILE, ADULT - FALL HARM RISK - HARM RISK INTERVENTIONS

## 2025-05-14 NOTE — ASU DISCHARGE PLAN (ADULT/PEDIATRIC) - NURSING INSTRUCTIONS
LAST DOSE OF TYLENOL WAS GIVEN AT 10:20AM --> NEXT DOSE OF TYLENOL IS OK TO TAKE AT 4:20PM , IF NEEDED FOR PAIN. **Do not exceed more than 4000mg of Tylenol in one 24 hour setting.**

## 2025-05-14 NOTE — ASU PATIENT PROFILE, ADULT - ABILITY TO HEAR (WITH HEARING AID OR HEARING APPLIANCE IF NORMALLY USED):
Applied
Mildly to Moderately Impaired: difficulty hearing in some environments or speaker may need to increase volume or speak distinctly

## 2025-05-14 NOTE — ASU DISCHARGE PLAN (ADULT/PEDIATRIC) - FINANCIAL ASSISTANCE
[facility] provides services at a reduced cost to those who are determined to be eligible through [facility]’s financial assistance program. Information regarding [facility]’s financial assistance program can be found by going to [email or web address] or by calling [phone].

## 2025-05-14 NOTE — PRE-ANESTHESIA EVALUATION ADULT - SPO2 (%)
CHIEF COMPLAINT:  Annual physical   SUBJECTIVE:  Ms Annie Whittington is a pleasant 52 year old  female seen today for a physical.  She is due for  screening labs, mammogram, Pap and a colonoscopy. She is overall healthy.  She has the following chronic stable medical concerns:        Hypertension:  Annie is compliant with ACE/ARBs without report of adverse side effects.   Patient denies headaches, dizziness, chest pain, palpitations, shortness of breath or edema. Is working on smoking cessation, wearing patches. Caffeine: Coke   BP Readings from Last 3 Encounters:   07/23/24 (!) 164/88   06/16/23 118/84   01/08/13 112/64       Asthma:   Annie is compliant with medications.   Denies acute exacerbations or missed days of work or school.   No coughing or wheezing at night or with exercise.   Uses rescue inhaler: albuterol      Recent PHQ 2/9 Score  PHQ 2:  PHQ 2 Score Adult PHQ 2 Score Adult PHQ 2 Interpretation Little interest or pleasure in activity?   7/23/2024   7:48 AM 0 No further screening needed 0          ALLERGIES:  ALLERGIES:   Allergen Reactions   • Ace Inhibitors Cough     Adverse reaction to ACE inhibitor   • Amoxicillin-Pot Clavulanate Other (See Comments)     Yeast infection, diarrhea         CURRENT MEDICATIONS:  Current Outpatient Medications   Medication Sig Dispense Refill   • losartan (COZAAR) 100 MG tablet Take 1 tablet by mouth daily. 90 tablet 3   • albuterol 108 (90 Base) MCG/ACT inhaler Inhale 2 puffs into the lungs every 4 hours as needed for Shortness of Breath or Wheezing. 1 each 5   • azithromycin (ZITHROMAX) 250 MG tablet Take 2 tablets today, then 1 tablet daily for 4 days. 6 tablet 0   • methylPREDNISolone (MEDROL DOSEPAK) 4 MG tablet Take 1 tablet by mouth as directed. follow package directions 21 tablet 0   • amLODIPine (NORVASC) 5 MG tablet Take 1 tablet by mouth daily. 90 tablet 1     No current facility-administered medications for this visit.        PAST MEDICAL 
Screening Questionnaire for Adult Immunization    Are you sick today?   No   Do you have allergies to medications, food, a vaccine component or latex?   Yes   Have you ever had a serious reaction after receiving a vaccination?   No   Do you have a long-term health problem with heart disease, lung disease, asthma, kidney disease, metabolic disease (e.g. diabetes), anemia, or other blood disorder?   Yes   Do you have cancer, leukemia, HIV/AIDS, or any other immune system problem?   No   In the past 3 months, have you taken medications that affect  your immune system, such as prednisone, other steroids, or anticancer drugs; drugs for the treatment of rheumatoid arthritis, Crohn s disease, or psoriasis; or have you had radiation treatments?   No   Have you had a seizure, or a brain or other nervous system problem?   No   During the past year, have you received a transfusion of blood or blood     products, or been given immune (gamma) globulin or antiviral drug?   No   For women: Are you pregnant or is there a chance you could become        pregnant during the next month?   No   Have you received any vaccinations in the past 4 weeks?   No     Immunization questionnaire was positive for at least one answer.  Notified Dr. Marie Palmer  .             Screening performed by Kamilla Francois on 7/30/2018 at 12:22 PM.    
HISTORY:  Past Medical History:   Diagnosis Date   • Hypertension    • Menopausal symptoms 2015        PAST SURGICAL HISTORY:  Past Surgical History:   Procedure Laterality Date   • Colposcopy cervix & upper / adjacent vagina             FAMILY HISTORY:  Family History   Problem Relation Age of Onset   • Cancer Mother         Thymus   • Cancer, Breast Mother    • Myasthenia gravis Mother    • Coronary Artery Disease Father    • Patient is unaware of any medical problems Maternal Grandmother    • Patient is unaware of any medical problems Maternal Grandfather    • Patient is unaware of any medical problems Paternal Grandmother    • Patient is unaware of any medical problems Paternal Grandfather        SOCIAL HISTORY:  Social History     Socioeconomic History   • Marital status: /Civil Union     Spouse name: Not on file   • Number of children: Not on file   • Years of education: Not on file   • Highest education level: Not on file   Occupational History   • Not on file   Tobacco Use   • Smoking status: Some Days     Current packs/day: 0.00     Average packs/day: 1 pack/day for 15.0 years (15.0 ttl pk-yrs)     Types: Cigarettes     Start date: 1997     Last attempt to quit: 2012     Years since quittin.2   • Smokeless tobacco: Never   Substance and Sexual Activity   • Alcohol use: Yes     Comment: occasionally   • Drug use: No   • Sexual activity: Not on file   Other Topics Concern   •  Service No   • Blood Transfusions No   • Caffeine Concern Yes     Comment: coke   • Occupational Exposure No   • Hobby Hazards No   • Sleep Concern No   • Stress Concern No   • Weight Concern No   • Special Diet No   • Back Care Not Asked   • Exercise No   • Bike Helmet Not Asked   • Seat Belt Yes   • Self-Exams Yes   Social History Narrative   • Not on file     Social Determinants of Health     Financial Resource Strain: Not on file   Food Insecurity: Not on file   Transportation Needs: Not on 
100
file   Physical Activity: Not on file   Stress: Not on file   Social Connections: Not on file   Interpersonal Safety: Not on file          Diet: diverse diet   Exercise: walks  Immunizations: hep b, shingles, pneumococcal  Dermatology: no concerns  Vision: wears glasses, [employer]  Dental: needs home dental  Safety: Wears seat belt at all times. Wears sunscreen in the warmer months. Carbon monoxide and smoke detectors in the home. Denies hx of domestic abuse and feels safe at home.       REVIEW OF SYSTEMS:  General:  Denies fever, chills or weight changes.    Skin:  Denies any concerning lesions. No hair or nail concerns.  No bruising.  Eyes:  Denies blurry vision or double vision. Patient wears glasses.  ENT:  Denies hearing concerns, Positive for sinus congestion, rhinorrhea, difficulty swallowing or hoarse voice.  Cardiovascular:  Denies chest pains, palpitations, leg pain or peripheral edema.  Respiratory:  Denies shortness of breath, wheezing, coughing or snoring.  Breasts:  Denies lumps, pain, skin changes or nipple discharge.    GI:  Denies abdominal pain, nausea, vomiting, diarrhea, constipation, black stools or bleeding.  :  Denies urinary pain, frequency, urgency, bleeding or incontinence.    GYN:  Denies vaginal pain, itching or discharge. Post menopausal. No history of sexually transmitted infections or concerns.   Musculoskeletal:  Denies any joint or muscle pain.  No unusual swelling or weakness.    Neurologic:  Denies headache, dizziness, numbness, seizure or memory problems.  Psychological:  Denies depression, anxiety, compulsions or erratic behavior.        OBJECTIVE:  Visit Vitals  BP (!) 164/88   Pulse 86   Ht 4' 11.5\" (1.511 m)   Wt 53.1 kg (117 lb)   SpO2 99%   BMI 23.24 kg/m²          General:  Alert, pleasant female in no acute distress.  Skin:  Warm, pink and dry. No suspicious lesions noted.  Eyes:  PERRLA.  Sclera white.  EOMI.  HENT:  Head: Normocephalic, atraumatic.  Ears: TM's 
intact.  Landmarks noted.  No erythema or drainage. External canals open. Hearing appropriate to conversation. Nose: Bilateral nares patent.  No drainage. Mouth: Mucosa pink and moist. Posterior pharynx erythematous. Neck: Soft, supple and no lymphadenopathy.  Thyroid nonpalpable.  Cardiovascular:  Regular rate, normal S1, S2.  No murmur, gallop or rub.  Radial and dorsalis pedis pulses 2+ bilaterally.  No peripheral edema.  Respiratory:  Lungs clear to auscultation.  No wheezes, rhonchi or crackles.  Breasts:  deferred  GI/:  Abdomen soft, nontender. No masses.  No hepatosplenomegaly.  Active bowel sounds in all quadrants.  No Costovetebral tenderness.  Bladder nonpalpable.  GYN:  deferred  Musculoskeletal:  Upper and lower extremities symmetric with equal strength 5/5 bilaterally.  Freely moving all 4 extremities.  No joint deformities.  Spine with normal curvature.  Neurologic:  Alert, oriented X3.  Cranial nerves intact.  Deep Tendon Reflexes 2+ and grossly intact throughout.  Psychologic: Alert, good eye contact, thoughts connected.  Dress and appearance appropriate.      Body mass index is 23.24 kg/m².    BMI ASSESSMENT/PLAN:  BMI is within normal range.     DEPRESSION ASSESSMENT/PLAN:  Depression screening is negative no further plan needed.        ASSESSMENT/PLAN:  1. Annual physical exam  Discussed health and wellness.    Recommend a low fat, low cholesterol, low salt, low refined sugar diet.   Recommend exercise for at least 30 minutes 5 days of the week.    Recommend Calcium 1200 mg and Vitamin D 800 units daily.    Pap smear screening complete: patient reported 2018     Next Pap smear due:  Now, patient defers     Screening Mammogram ordered: Today, due not     Colonoscopy ( women start at age 45):  Due no, patient defers colon cancer screening.     Bone density screening (65 and over or post menopausal with risk factors): Calcium and vitamin-D supplementation reviewed.    Encouraged 
Alcohol and caffeine in moderation.    She will be notified of all pending lab results.    Recommend a yearly physical.    2. Primary hypertension  Uncontrolled, continue losartan 100 mg daily, start amlodipine 5 mg daily.  Advised on medication use, side effects and expected results.  Advised dietary modifications of low salt diet, maintain adequate hydration, avoid caffeine and maintain physical activity.  Will have BP repeated with fasting labs.  - losartan (COZAAR) 100 MG tablet; Take 1 tablet by mouth daily.  Dispense: 90 tablet; Refill: 3  - amLODIPine (NORVASC) 5 MG tablet; Take 1 tablet by mouth daily.  Dispense: 90 tablet; Refill: 1    3. Moderate persistent asthma without complication (CMD)  Stable, The current medical regimen is effective;  continue present plan and medications.  - albuterol 108 (90 Base) MCG/ACT inhaler; Inhale 2 puffs into the lungs every 4 hours as needed for Shortness of Breath or Wheezing.  Dispense: 1 each; Refill: 5    4. Acute non-recurrent pansinusitis  Start daily allergy medication, warm salt water gargles.  Tx with azithromycin and medrol dose pack. Advised on medication use, side effects and expected results.  She is to call clinic if no improvement, will switch to doxycycline.  - azithromycin (ZITHROMAX) 250 MG tablet; Take 2 tablets today, then 1 tablet daily for 4 days.  Dispense: 6 tablet; Refill: 0  - methylPREDNISolone (MEDROL DOSEPAK) 4 MG tablet; Take 1 tablet by mouth as directed. follow package directions  Dispense: 21 tablet; Refill: 0    5. Encounter for screening mammogram for malignant neoplasm of breast  - MAMMO SCREENING BILATERAL W RODRIGO; Future        Return in about 6 months (around 1/23/2025) for hypertension med management; 1 year annual physical .          BETH Dorsey  [place] [address] [address] [facility] [address]  [phone]          Dr. Ulysses Boco serves as supervising physician

## 2025-05-14 NOTE — ASU DISCHARGE PLAN (ADULT/PEDIATRIC) - ASU DC SPECIAL INSTRUCTIONSFT
GENERAL: It is common to have blood in your urine after your procedure. It may be pink or even red; inform your doctor if you have a significant amount of clot in the urine or if you are unable to void at all or if your catheter stops draining. The urine may clear and then become bloody again especially as you are more physically active. It is not uncommon to have some burning when you urinate, this will gradually improve.   BATHING: You may shower or bathe. If going home with willard, shower only until catheter is removed.  DIET: You may resume your regular diet and regular medication regimen.  PAIN: You may take Tylenol (acetaminophen) 650-975mg and/or Motrin (ibuprofen) 400-600mg, both available over the counter, for pain every 6 hours as needed. Do not exceed 4000mg of Tylenol (acetaminophen) daily. You may alternate these medications such that you take one or the other every 3 hours for around the clock pain coverage.  ANTIBIOTICS: You may be given a prescription for an antibiotic, please take this medication as instructed and be sure to complete the entire course. NONE  STOOL SOFTENERS: Do not allow yourself to become constipated as straining may cause bleeding. Take stool softeners or a laxative (ex. Miralax, Colace, Senokot, ExLax, etc), available over the counter, if needed.  ACTIVITY: No heavy lifting or strenuous exercise until you are evaluated at your post-operative appointment. Otherwise, you may return to your usual level of physical activity.  ANTICOAGULATION: If you are taking any blood thinning medications, please discuss with your urologist prior to restarting these medications unless otherwise specified.  FOLLOW-UP: If you did not already schedule your post-operative appointment, please call your urologist to schedule and follow-up appointment.  CALL YOUR UROLOGIST IF: You have any bleeding that does not stop, inability to void >8 hours, fever over 100.4 F, chills, persistent nausea/vomiting, changes in your incision concerning for infection, or if your pain is not controlled on your discharge pain medications.

## 2025-05-14 NOTE — ASU PREOP CHECKLIST - HAND OFF
applicable)     Details if applicable:       [phone] Therapeutic Activity (timed):  use of dynamic activities replicating functional movements to increase ROM, strength, coordination, balance, and proprioception in order to improve patient's ability to progress to PLOF and address remaining functional goals.  (see flow sheet as applicable)     Details if applicable:            Details if applicable:            Details if applicable:            Details if applicable:     40 38 MC BC Totals Reminder: bill using total billable min of TIMED therapeutic procedures (example: do not include dry needle or estim unattended, both untimed codes, in totals to left)  8-22 min = 1 unit; 23-37 min = 2 units; 38-52 min = 3 units; 53-67 min = 4 units; 68-82 min = 5 units   Total Total     [x]  Patient Education billed concurrently with other procedures   [x] Review HEP    [] Progressed/Changed HEP, detail:    [] Other detail:       Objective Information/Functional Measures/Assessment    VC exercises and technique  Initiated by exercise tech    Still with some residual increase soreness after increase in activities 2 sessions ago.   Progressing as able and expected  2# added to SLR F         Patient will continue to benefit from skilled PT / OT services to modify and progress therapeutic interventions, analyze and address ROM deficits, analyze and address strength deficits, analyze and address soft tissue restrictions, analyze and cue for proper movement patterns, analyze and modify for postural abnormalities, and instruct in home and community integration to address functional deficits and attain remaining goals.    Progress toward goals / Updated goals:  []  See Progress Note/Recertification  Short Term Goals: To be accomplished in 2 WEEKS  1 patient will have established and be I with HEP to aid with independence and self management at discharge  EVAL HEP issued at evaluation  CURRENT reports compliance 9/3/24  2 patient will 
Holding RN to OR RN

## 2025-05-14 NOTE — ASU PATIENT PROFILE, ADULT - NSICDXPASTMEDICALHX_GEN_ALL_CORE_FT
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 follow up after an year, last visit 11/2022    Chronic back pain -on oxycodone prn and tylenol    Cigar smoker     Colon cancer     Diabetes mellitus, type 2     Epistaxis hospitalized 3/2021- resolved    Hepatitis C Pt was treated 7 years ago    History of peripheral neuropathy     History of sciatica leg pains    LPRD (laryngopharyngeal reflux disease)     Lumbar disc disease     Malignant neoplasm of colon, unspecified part of colon s/p microsurgery at [facility]  no chemo/XRT in remission since 2012    Mild intermittent stable asthma     Vertigo

## 2025-05-14 NOTE — PRE-ANESTHESIA EVALUATION ADULT - BP NONINVASIVE SYSTOLIC (MM HG)
Advance Care Planning   Healthcare Decision Maker:    Primary Decision Maker: Atiya Muir - Child - [phone]    Secondary Decision Maker: Dayron Euceda - Child - [phone]    Supplemental (Other) Decision Maker: Damien Nevarez - Child - [phone]    Click here to complete Healthcare Decision Makers including selection of the Healthcare Decision Maker Relationship (ie \"Primary\"). 112

## 2025-05-27 NOTE — H&P PST ADULT - CENTRAL VENOUS CATHETER
Internal Medicine Progress Note                                Patient Name: Meng Oglesby    YOB: 1959    MRN: [id number]         Date of Service: 5/27/2025    Subjective:    NAEO. Pt seen and examined at bedside. Ambulated to the bathroom without any symptoms. Had small loose BM this AM. No LH/dizziness upon standing. No n/v. SOB/cough improved. No longer on supplemental nasal canula.     Visit Vitals  /76 (BP Location: RUE - Right upper extremity, Patient Position: Semi-Stoddard's)   Pulse 74   Temp 97.9 °F (36.6 °C) (Oral)   Resp 16   Ht 6' (1.829 m)   Wt 101 kg (222 lb 10.6 oz)   SpO2 96%   BMI 30.20 kg/m²          Physical Exam  General: AOx4, NAD, appears stated age, resting in bed   HEENT: normocephalic, atraumatic, MMM   Neck: supple, nontender, no LAD   CV: RRR, +S1 +S2, no murmurs/rubs/gallops  Lungs: CTAB, no crackles/rhonchi/wheezing   Abdomen: soft, nontender, nondistended. No rigidity, rebound or guarding.   Extremities: no peripheral edema, clubbing, or cyanosis   Neuro: CN 2-12 grossly intact   Skin: warm, dry, intact      Intake/Output Summary (Last 24 hours) at 5/27/2025 0648  Last data filed at 5/27/2025 0200  Gross per 24 hour   Intake 1040 ml   Output 600 ml   Net 440 ml        Assessment & Plan:  Meng Oglesby is a 65 year old male with history of hypertension, anxiety, prostate cancer with metastasis to bone who presents to the ED with cough for 3-4 days and fatigue.     Acute hypoxic respiratory failure 2/2 Metapneumovirus Pneumonia (improved)  C/f superimposed bacterial pna  - afebrile  - CXR with basilar opacities, possible consolidation   - Leukocytosis  - hypoxic on arrival  - legionella negative  Plan  - Continue azithromycin (2/3)  - Given improvement in o2 requirements/symptoms, will switch to PO cefdinir today for additional 4 day course  - Budesonide neb BID for one more day     Moderate Hypotonic Hyponatremia  Hypochloremia   Diarrhea   - suspect  hypovolemic hyponatremia in the setting of diarrhea  - serum osmol 261  - Na 120 on admission, was normal 5/1/25  - Uosm 704, Yesica 23  Plan  - BMP q 4hours currently, likely can space out to q6-8 pending nephro recs now that improved  - Nephro following  - f/u GI pathogen panel (collected)  - Goal Na 133 tomorrow AM  - D5 drip held currently per nephro recs       History of Prostate Cancer with Metastasis to bone  - previously on Erleada, stopped due to rash and swelling  - does not seem to be currently undergoing treatment     Primary Hypertension   - continue PTA amlodipine 10mg daily, captopril 50mg BID   - Resume home metoprolol 100mg daily     Generalized Anxiety  - Resume home fluoxetine  - if increased anxiety can consider giving his PTA xanax      Hyperlipidemia   - continue rosuvastatin 10mg daily      FENA  Fluids: cautious given hyponatremia   Electrolytes: replace PRN  Nutrition: regular diet  Activity: advance to baseline     PPx:  VTE: lovenox 40 mg subQ daily  GI: not indicated     Code Status: FULL CODE     Dispo: [facility], pending pneumonia, hyponatremia workup     Estimated Date of Discharge: 5/28/25     Of note estimated date of discharge is based on the patient's clinical status the day of this note. It is subject to change depending on medical or social factors.    Discussed with attending, Dr. Jessica Quick, DO   [facility] Internal Medicine PGY3  MAR Team       no

## 2025-06-30 ENCOUNTER — RX RENEWAL (OUTPATIENT)
Age: 80
End: 2025-06-30

## 2025-07-08 ENCOUNTER — APPOINTMENT (OUTPATIENT)
Dept: CARDIOLOGY | Facility: CLINIC | Age: 80
End: 2025-07-08
Payer: MEDICARE

## 2025-07-08 VITALS
WEIGHT: 165 LBS | BODY MASS INDEX: 25.9 KG/M2 | SYSTOLIC BLOOD PRESSURE: 108 MMHG | HEIGHT: 67 IN | HEART RATE: 80 BPM | DIASTOLIC BLOOD PRESSURE: 76 MMHG | OXYGEN SATURATION: 98 %

## 2025-07-08 PROBLEM — R56.9 SEIZURE-LIKE ACTIVITY: Status: ACTIVE | Noted: 2025-07-08

## 2025-07-08 PROCEDURE — 93000 ELECTROCARDIOGRAM COMPLETE: CPT

## 2025-07-08 PROCEDURE — 99214 OFFICE O/P EST MOD 30 MIN: CPT | Mod: 25

## 2025-08-14 ENCOUNTER — RX RENEWAL (OUTPATIENT)
Age: 80
End: 2025-08-14

## (undated) DEVICE — POSITIONER FOAM EGG CRATE ULNAR 2PCS (PINK)

## (undated) DEVICE — ELCTR CUTTING LOOP RIGHT ANGLE 26FR

## (undated) DEVICE — SYR LUER LOK 30CC

## (undated) DEVICE — FOLEY TRAY 16FR 5CC LTX UMETER CLOSED

## (undated) DEVICE — CATH FOLEY 2 WAY 16FR 5CC LATEX HYDROGEL

## (undated) DEVICE — SPECIMEN CONTAINER 100ML

## (undated) DEVICE — SOL IRR BAG H2O 3000ML

## (undated) DEVICE — PREP BETADINE KIT

## (undated) DEVICE — FOLEY CATH 2-WAY 18FR 5CC LATEX HYDROGEL

## (undated) DEVICE — GLV 8.5 PROTEXIS (WHITE)

## (undated) DEVICE — ELCTR BUGBEE FULGATING 5FR X 58CM

## (undated) DEVICE — WARMING BLANKET UPPER ADULT

## (undated) DEVICE — SOL IRR POUR H2O 1500ML

## (undated) DEVICE — ELCTR CUTTING LOOP RIGHT ANGLE 24FR

## (undated) DEVICE — TUBING RANGER FLUID IRRIGATION SET DISP

## (undated) DEVICE — DRAPE LAPAROSCOPIC FLUID CONTROL PACK

## (undated) DEVICE — FOLEY CATH 2-WAY 16FR 5CC LATEX LUBRICATH

## (undated) DEVICE — IRRIGATION TRAY W PISTON SYRINGE 60ML

## (undated) DEVICE — FOLEY CATH 3-WAY 20FR 30CC LATEX LUBRICATH

## (undated) DEVICE — ACMI SELF-SEALING SEAL UP TO 7FR

## (undated) DEVICE — GLV 6.5 PROTEXIS (WHITE)

## (undated) DEVICE — FOLEY CATH 3-WAY 26FR 30CC LATEX LUBRICATH

## (undated) DEVICE — ELCTR BUTTON

## (undated) DEVICE — ELCTR VAPORIZT GRV BLK

## (undated) DEVICE — GLV 7 PROTEXIS (WHITE)

## (undated) DEVICE — SYR ASEPTO

## (undated) DEVICE — FOLEY CATH 3-WAY 18FR 30CC LATEX LUBRICATH

## (undated) DEVICE — GLV 7.5 PROTEXIS (WHITE)

## (undated) DEVICE — GOWN TRIMAX LG

## (undated) DEVICE — SOL IRR BAG NS 0.9% 3000ML

## (undated) DEVICE — FOLEY CATH 3-WAY 20FR 5CC LATEX LUBRICATH

## (undated) DEVICE — ELCTR GROOVED VAPOR

## (undated) DEVICE — TUBING SUCTION 20FT

## (undated) DEVICE — Device

## (undated) DEVICE — BAG URINE W METER 2L

## (undated) DEVICE — ELCTR CUTTING LOOP 24FR RIGHT ANGLE

## (undated) DEVICE — FOLEY CATH 2-WAY 20F 5CC LATEX LUBRICATH

## (undated) DEVICE — VENODYNE/SCD SLEEVE CALF LARGE

## (undated) DEVICE — FOLEY HOLDER STATLOCK 2 WAY ADULT

## (undated) DEVICE — GLV 8 PROTEXIS (WHITE)

## (undated) DEVICE — FOLEY CATH 3-WAY 24FR 30CC LATEX LUBRICATH

## (undated) DEVICE — FOLEY CATH 3-WAY 22FR 30CC LATEX LUBRICATH

## (undated) DEVICE — FOLEY CATH 3-WAY 20FR 30CC LATEX HEMATURIA

## (undated) DEVICE — NSA-GENERATOR - THUNDERBEAT 12332W130010: Type: DURABLE MEDICAL EQUIPMENT

## (undated) DEVICE — FOLEY CATH 2-WAY 22FR 5CC LATEX COUNCIL RED

## (undated) DEVICE — PREP BETADINE 5% STERILE OPTHALMIC SOLUTION

## (undated) DEVICE — FOLEY CATH 3-WAY 24FR 5CC LATEX LUBRICATH

## (undated) DEVICE — FOLEY CATH 2-WAY 24FR 5CC LATEX HYDROGEL

## (undated) DEVICE — CABLE DAC ACTIVE CORD

## (undated) DEVICE — NSA-STRYKER VIDEO TOWER: Type: DURABLE MEDICAL EQUIPMENT

## (undated) DEVICE — SOL IRR GLYCINE 1.5% 3000L

## (undated) DEVICE — PACK CYSTO

## (undated) DEVICE — ELCTR ROLLER BALL BLK 24-26FR

## (undated) DEVICE — SYR IRRIGATION PISTON 60CC